# Patient Record
Sex: MALE | Race: ASIAN
[De-identification: names, ages, dates, MRNs, and addresses within clinical notes are randomized per-mention and may not be internally consistent; named-entity substitution may affect disease eponyms.]

---

## 2017-05-04 ENCOUNTER — HOSPITAL ENCOUNTER (INPATIENT)
Dept: HOSPITAL 74 - JER | Age: 72
LOS: 5 days | Discharge: HOME HEALTH | DRG: 292 | End: 2017-05-09
Attending: FAMILY MEDICINE | Admitting: FAMILY MEDICINE
Payer: MEDICARE

## 2017-05-04 VITALS — BODY MASS INDEX: 34.6 KG/M2

## 2017-05-04 DIAGNOSIS — Z79.4: ICD-10-CM

## 2017-05-04 DIAGNOSIS — R42: ICD-10-CM

## 2017-05-04 DIAGNOSIS — I50.23: ICD-10-CM

## 2017-05-04 DIAGNOSIS — F41.9: ICD-10-CM

## 2017-05-04 DIAGNOSIS — Z95.5: ICD-10-CM

## 2017-05-04 DIAGNOSIS — I87.2: ICD-10-CM

## 2017-05-04 DIAGNOSIS — E78.5: ICD-10-CM

## 2017-05-04 DIAGNOSIS — K59.01: ICD-10-CM

## 2017-05-04 DIAGNOSIS — I48.91: ICD-10-CM

## 2017-05-04 DIAGNOSIS — Z95.0: ICD-10-CM

## 2017-05-04 DIAGNOSIS — J44.9: ICD-10-CM

## 2017-05-04 DIAGNOSIS — M48.07: ICD-10-CM

## 2017-05-04 DIAGNOSIS — E11.42: ICD-10-CM

## 2017-05-04 DIAGNOSIS — N17.9: ICD-10-CM

## 2017-05-04 DIAGNOSIS — I11.0: Primary | ICD-10-CM

## 2017-05-04 DIAGNOSIS — G20: ICD-10-CM

## 2017-05-04 DIAGNOSIS — E66.9: ICD-10-CM

## 2017-05-04 DIAGNOSIS — I25.10: ICD-10-CM

## 2017-05-04 LAB
ALBUMIN SERPL-MCNC: 3.6 G/DL (ref 3.4–5)
ALP SERPL-CCNC: 78 U/L (ref 45–117)
ALT SERPL-CCNC: 44 U/L (ref 12–78)
ANION GAP SERPL CALC-SCNC: 7 MMOL/L (ref 8–16)
APPEARANCE UR: CLEAR
AST SERPL-CCNC: 38 U/L (ref 15–37)
BASOPHILS # BLD: 0.6 % (ref 0–2)
BILIRUB SERPL-MCNC: 0.7 MG/DL (ref 0.2–1)
BILIRUB UR STRIP.AUTO-MCNC: NEGATIVE MG/DL
CALCIUM SERPL-MCNC: 8.3 MG/DL (ref 8.5–10.1)
CO2 SERPL-SCNC: 26 MMOL/L (ref 21–32)
COCKROFT - GAULT: 76.43
COLOR UR: (no result)
CREAT SERPL-MCNC: 1.2 MG/DL (ref 0.7–1.3)
DEPRECATED RDW RBC AUTO: 15.8 % (ref 11.9–15.9)
EOSINOPHIL # BLD: 2.2 % (ref 0–4.5)
GLUCOSE SERPL-MCNC: 203 MG/DL (ref 74–106)
HYALINE CASTS URNS QL MICRO: 1 /LPF
KETONES UR QL STRIP: NEGATIVE
LEUKOCYTE ESTERASE UR QL STRIP.AUTO: NEGATIVE
MCH RBC QN AUTO: 26.1 PG (ref 25.7–33.7)
MCHC RBC AUTO-ENTMCNC: 32.9 G/DL (ref 32–35.9)
MCV RBC: 79.5 FL (ref 80–96)
MUCOUS THREADS URNS QL MICRO: (no result)
NEUTROPHILS # BLD: 78.7 % (ref 42.8–82.8)
NITRITE UR QL STRIP: NEGATIVE
PH UR: 5 [PH] (ref 5–8)
PLATELET # BLD AUTO: 135 K/MM3 (ref 134–434)
PMV BLD: 11.3 FL (ref 7.5–11.1)
PROT SERPL-MCNC: 6.6 G/DL (ref 6.4–8.2)
PROT UR QL STRIP: (no result)
PROT UR QL STRIP: NEGATIVE
RBC # BLD AUTO: <1 /HPF (ref 0–3)
RBC # UR STRIP: NEGATIVE /UL
TROPONIN I SERPL-MCNC: 0.02 NG/ML (ref 0–0.05)
UROBILINOGEN UR STRIP-MCNC: NEGATIVE E.U./DL (ref 0.2–1)
WBC # BLD AUTO: 8.8 K/MM3 (ref 4–10)
WBC # UR AUTO: 5 /HPF (ref 3–5)

## 2017-05-04 RX ADMIN — INSULIN ASPART SCH UNITS: 100 INJECTION, SOLUTION INTRAVENOUS; SUBCUTANEOUS at 22:11

## 2017-05-04 RX ADMIN — CARBIDOPA AND LEVODOPA SCH EACH: 25; 100 TABLET ORAL at 22:09

## 2017-05-04 RX ADMIN — POLYETHYLENE GLYCOL 3350 SCH GM: 17 POWDER, FOR SOLUTION ORAL at 22:01

## 2017-05-04 RX ADMIN — PRAMIPEXOLE DIHYDROCHLORIDE SCH MG: 0.25 TABLET ORAL at 22:00

## 2017-05-04 RX ADMIN — HEPARIN SODIUM SCH UNIT: 5000 INJECTION, SOLUTION INTRAVENOUS; SUBCUTANEOUS at 22:00

## 2017-05-04 RX ADMIN — GABAPENTIN SCH MG: 100 CAPSULE ORAL at 22:00

## 2017-05-04 RX ADMIN — POTASSIUM CHLORIDE SCH MEQ: 750 TABLET, EXTENDED RELEASE ORAL at 22:00

## 2017-05-04 RX ADMIN — ATORVASTATIN CALCIUM SCH MG: 20 TABLET, FILM COATED ORAL at 22:00

## 2017-05-04 RX ADMIN — CARBIDOPA AND LEVODOPA SCH: 25; 100 TABLET ORAL at 19:19

## 2017-05-04 RX ADMIN — INSULIN ASPART SCH: 100 INJECTION, SOLUTION INTRAVENOUS; SUBCUTANEOUS at 18:11

## 2017-05-04 NOTE — CON.GI
Consult


Consult Specialty:: gastroenterology


Reason for Consultation:: constipation





- History of Present Illness


History of Present Illness: 


I was asked by his son to see his dad. Patient known to me, history of Parkinson

's disease developed severe constipation. His last bowel movement was 1 week 

ago. He was admired with CHF and fluid overload.





- Past Medical History


Cardio/Vascular: Yes: AFIB, CAD, CHF, HTN, Hyperlipdemia, Other (ppm)





- Past Surgical History


Past Surgical History: Yes: Permanent Pacemaker





- Alcohol/Substance Use


Hx Alcohol Use: No





- Smoking History


Smoking history: Never smoked


Have you smoked in the past 12 months: No





- Social History


Usual Living Arrangement: With Spouse


ADL: Independent


History of Recent Travel: No





Home Medications





- Allergies


Allergies/Adverse Reactions: 


 Allergies











Allergy/AdvReac Type Severity Reaction Status Date / Time


 


No Known Drug Allergies Allergy   Verified 05/04/17 09:39














- Home Medications


Home Medications: 


Ambulatory Orders





Furosemide [Lasix -] 40 mg PO DAILY 12/30/16 


Gabapentin 100 mg PO TID 12/30/16 


Linagliptin [Tradjenta] 5 mg PO DAILY 12/30/16 


Mirabegron [Myrbetriq] 50 mg PO DAILY 12/30/16 


Atorvastatin Ca [Lipitor] 20 mg PO DAILY 05/04/17 


Carbidopa/Levodopa [Carbidopa-Levodopa  Tab] 1 each PO Q4H 05/04/17 


Insulin Glargine,Hum.rec.anlog [Lantus (nf)] 50 units SQ AM 05/04/17 


Linaclotide [Linzess] 290 mcg PO DAILY 05/04/17 


Naloxegol Oxalate [Movantik] 25 mg PO DAILY 05/04/17 


Potassium Chloride [Klor-Con 10] 10 meq PO BID 05/04/17 


Pramipexole Di-HCl [Pramipexole Dihydrochloride] 0.75 mg PO ASDIR 05/04/17 











Physical Exam-GI


Vital Signs: 


 Vital Signs











Temperature  98.1 F   05/04/17 18:24


 


Pulse Rate  65   05/04/17 18:24


 


Respiratory Rate  20   05/04/17 18:24


 


Blood Pressure  139/66   05/04/17 18:24


 


O2 Sat by Pulse Oximetry (%)  99   05/04/17 18:24











Constitutional: Yes: Well Nourished


Eyes: Yes: Conjunctiva Clear


HENT: Yes: Atraumatic


Neck: Yes: Supple


Cardiovascular: Yes: Regular Rate and Rhythm


Respiratory: Yes: CTA Bilaterally


Gastrointestinal Inspection: Yes: Distention


...Palpate: Yes: Soft.  No: Firm/Rigid, Guarding, Hepatomegaly, Mass, Pulsatile 

Mass, Splenomegaly, Tenderness


...Percussion: Yes: Tympanitic





Imaging





- Results


X-ray: Report Reviewed (colon retention)





Problem List





- Problems


(1) Constipation by delayed colonic transit


Assessment/Plan: 


R> oil retention enemas


   citroma in am 


Mirlax 34 gram tid


 dulcolax 2 tabs bid


Code(s): K59.01 - SLOW TRANSIT CONSTIPATION

## 2017-05-04 NOTE — EKG
Test Reason : 

Blood Pressure : ***/*** mmHG

Vent. Rate : 069 BPM     Atrial Rate : 080 BPM

   P-R Int : 000 ms          QRS Dur : 110 ms

    QT Int : 400 ms       P-R-T Axes : 000 -32 172 degrees

   QTc Int : 428 ms

 

Ventricular-paced rhythm

ABNORMAL ECG

WHEN COMPARED WITH ECG OF 07-NOV-2016 00:48,

ELECTRONIC VENTRICULAR PACEMAKER HAS REPLACED SINUS RHYTHM

Confirmed by NITA DRISCOLL MD ([id number]) on 5/4/2017 5:23:54 PM

 

Referred By:             Confirmed By:NITA DRISCOLL MD

## 2017-05-04 NOTE — CON.CARD
Consult


Consult Specialty:: Cardiology


Referred by:: ER


Reason for Consultation:: SOB, edema





- History of Present Illness


Chief Complaint: Weakness, constipation, sob, edema


History of Present Illness: 


71 year old man with a history of HTN, HLD, DM, CAD s/p PCI LAD 2008 and again 

2013, repeat cardiac cath 10/2014 showed patent LAD stents an only non-

obstructive CAD in the other coronary territories, Chronic diastolic HF, 

Parkinsons disease, non-adherent with follow up, has not been to the office in 

several years, last seen here in the hospital 2 years ago with c/o dizziness, 

he has since undergone a PPM placement at an outside facility now presenting 

with constipation, sob, edema. Pt. seen and examined in the ER in [place]. Pt. 

states that he has been followed by a different cardiologist since he was last 

seen by us but he does not know the doctors name or location. He states that he 

had the PPM placed with that doctor. He is currently c/o constipation for 

several days and additionally notes worsening sob and edema. Denies chest pain. 

continues to have chronic dizziness. no syncope or near syncope. no pnd, 

orthopnea.





- History Source


History Provided By: Patient, Medical Record


Limitations to Obtaining History: Poor Historian





- Past Medical History


Cardio/Vascular: Yes: AFIB, CAD, CHF, HTN, Hyperlipdemia, Other (ppm)





- Past Surgical History


Past Surgical History: Yes: Permanent Pacemaker





- Alcohol/Substance Use


Hx Alcohol Use: No





- Smoking History


Smoking history: Never smoked


Have you smoked in the past 12 months: No





- Social History


Usual Living Arrangement: With Spouse


ADL: Independent


History of Recent Travel: No





Home Medications





- Allergies


Allergies/Adverse Reactions: 


 Allergies











Allergy/AdvReac Type Severity Reaction Status Date / Time


 


No Known Drug Allergies Allergy   Verified 05/04/17 09:39














- Home Medications


Home Medications: 


Ambulatory Orders





Furosemide [Lasix -] 40 mg PO DAILY 12/30/16 


Gabapentin 100 mg PO TID 12/30/16 


Linagliptin [Tradjenta] 5 mg PO DAILY 12/30/16 


Mirabegron [Myrbetriq] 50 mg PO DAILY 12/30/16 


Atorvastatin Ca [Lipitor] 20 mg PO HS 05/04/17 


Carbidopa/Levodopa [Carbidopa-Levodopa  Tab] 1 each PO Q4H 05/04/17 


Insulin Glargine,Hum.rec.anlog [Lantus (nf)] 50 units SQ AM 05/04/17 


Linaclotide [Linzess] 290 mcg PO DAILY 05/04/17 


Naloxegol Oxalate [Movantik] 25 mg PO DAILY 05/04/17 


Potassium Chloride [Klor-Con 10] 10 meq PO BID 05/04/17 


Pramipexole Di-HCl [Pramipexole Dihydrochloride] 0.75 mg PO ASDIR 05/04/17 











Family Disease History





- Family Disease History


Family History: Denies





Review of Systems





- Review of Systems


Constitutional: reports: Weakness.  denies: No Symptoms, Chills, Diaphoresis, 

Fever, Lethargy, Loss of Appetite, Malaise, Night Sweats, Unintentional Wgt. 

Loss, Other


Eyes: denies: No Symptoms, Blind Spots, Blurred Vision, Double Vision, Eye Pain

, Floaters, Photophobia, Recent Change in Vision, Other


HENT: denies: No Symptoms, Difficult Swallowing, Ear Discharge, Ear Pain, 

Epistaxis, Gingival Bleeding, Hearing Loss, Mouth Swelling, Nasal Congestion, 

Ocular Prosthesis, Throat Pain, Toothache, Ringing in Ears, Other


Neck: denies: No Symptoms, Decreased ROM, Lumps, Pain on Movement, Stiffness, 

Swollen Glands, Tenderness, Other


Cardiovascular: reports: Edema, Shortness of Breath.  denies: Chest Pain, 

Palpitations, Other


Respiratory: reports: Exercise Intolerance, SOB, SOB on Exertion.  denies: No 

Symptoms, Cough, Hemoptysis, Orthopnea, PND, Snoring, Wheezing, Other


Gastrointestinal: reports: Constipation.  denies: No Symptoms, Abdominal Pain, 

Bloating, Diarrhea, Dysphagia, Indigestion, Melena, Nausea, Rectal Bleeding, 

Vomiting, Vomiting Blood, Other


Genitourinary: denies: No Symptoms, Burning, Discharge, Dysuria, Flank Pain, 

Frequency, Hematuria, Incontinence, Lesions, Menses, Pain, Testicular Mass, 

Testicular Pain, Testicular Swelling, Urgency, Vaginal Bleeding, Other


Breasts: denies: No Symptoms Reported, See HPI, Breast Implants, Discharge from 

Nipple, Lumps, Pain, Skin Changes, Other


Musculoskeletal: denies: No Symptoms, Back Pain, Crepitus, Decreased ROM, 

Extremity Pain, Joint Pain, Joint Swelling, Muscle Pain, Muscle Cramps, Muscle 

Weakness, Other


Integumentary: denies: No Symptoms, Blister, Bruising, Change in Color, Eczema, 

Erythema, Incision, Lesions, Lump, Pallor, Pruritis, Rash, Wound, Other


Neurological: reports: Incoordination, Tremors.  denies: No Symptoms, Change in 

LOC, Change in Speech, Confusion, Dizziness, Headache, Numbness, Parasthesia, 

Pre-Existing Deficit, Seizure, Syncope, Unsteady Gait, Weakness, Other


Endocrine: denies: No Symptoms, Excessive Sweating, Flushing, Increased Hunger, 

Increased Thirst, Intolerance to Cold, Intolerance to Heat, Unexplained Weight 

Gain, Unexplained Weight Loss, Other


Hematology/Lymphatic: denies: No Symptoms, Easily Bruised, Excessive Bleeding, 

Swollen Glands, Other


Psychiatric: denies: No Symptoms, Altered Sleep Pattern, Anxiety, Depression, 

Hallucinations, Panic, Paranoia, Suicidal, Other





- Risk Factors


Known Risk Factors: Yes: Hypercholesterolemia, Hypertension


Vital Signs: 


 Vital Signs











Temperature  97.8 F   05/04/17 09:37


 


Pulse Rate  61   05/04/17 14:05


 


Respiratory Rate  18   05/04/17 14:05


 


Blood Pressure  130/63   05/04/17 14:05


 


O2 Sat by Pulse Oximetry (%)  100   05/04/17 14:05











Constitutional: Yes: No Distress, Calm, Obese


Eyes: Yes: Conjunctiva Clear, EOM Intact, PERRL


HENT: Yes: Atraumatic, Normocephalic


Neck: Yes: Supple, Trachea Midline


Respiratory: Yes: Regular, Diminished, Rales.  No: Rhonchi, SOB, Wheezes


Gastrointestinal: Yes: Normal Bowel Sounds, Soft.  No: Distention, Tenderness


Cardiovascular: Yes: Regular Rate and Rhythm.  No: Bradycardia, Tachycardia, 

Gallop, Rub, Varicosities


JVD: No


Carotid Bruit: No


PMI: Non-Displaced


Heart Sounds: Yes: S1, S2.  No: Split S2, S3, S4, Clicks, Gallop, Rub, Bruit


Murmur: No: Systolic Murmur, Diastolic Murmur


Musculoskeletal: Yes: Muscle Weakness


Edema: Yes


Edema: LLE: 1+, RLE: 1+


Peripheral Pulses WNL: Yes


Peripheral Pulses: 2+ Left Doralis Pedis, 2+ Right Dorsalis Pedis


Neurological: Yes: Alert, Oriented


Psychiatric: Yes: Alert, Oriented





- Other Data


Labs, Other Data: 


 CBC, BMP





 05/04/17 10:24 





 05/04/17 10:24 





 Troponin, BNP











  05/04/17 05/04/17





  10:24 10:24


 


Troponin I  0.02 


 


B-Natriuretic Peptide   687.64 H








 Troponin, BNP











  05/04/17 05/04/17





  10:24 10:24


 


Troponin I  0.02 


 


B-Natriuretic Peptide   687.64 H











ekg-NSR Vpaced 60bpm








Imaging





- Results


Chest X-ray: Report Reviewed, Image Reviewed


EKG: Report Reviewed, Image Reviewed


Other: Report Reviewed, Image Reviewed





Assessment/Plan


71 year old man with a history of HTN, HLD, DM, CAD s/p PCI LAD 2008 and again 

2013, repeat cardiac cath 10/2014 showed patent LAD stents an only non-

obstructive CAD in the other coronary territories, Chronic diastolic HF, 

Parkinsons disease, non-adherent with follow up, has not been to the office in 

several years, last seen here in the hospital 2 years ago with c/o dizziness, 

he has since undergone a PPM placement at an outside facility now presenting 

with constipation, sob, edema. Pt. seen and examined in the ER in [facility]. Pt. 

states that he has been followed by a different cardiologist since he was last 

seen by us but he does not know the doctors name or location. He states that he 

had the PPM placed with that doctor. He is currently c/o constipation for 

several days and additionally notes worsening sob and edema. Denies chest pain. 

continues to have chronic dizziness. no syncope or near syncope. no pnd, 

orthopnea.





SOB/Edema-acute on chronic diastolic HF-chronic LE edema


-start Lasix 40mg IV bid


-monitor strict I/Os and daily weights


-fluid restrict < 1L x 24 hours


-monitor bun/creat, electrolytes and replete as needed


-check echo





Dizziness-chronic, multiple possible etiologies, likely related to Parkinsons 

and possible orthostatic hypotension


-pt had a PPM placed since last seen at [facility]


-would consider neurologic sources and medications given Parkinsons disease


-echo done 4/4/15- TDS, no pericardial effusion, trace MR, mild calc AV with 

normal opening, E-A reversal c/w impaired relaxation, grossly normal LV 

systolic function


-carotid doppler done 4/14/15-no sign of atherosclerotic disease on either side

, no evidence of hemodynamically sig internal carotid artery stenosis on either 

side


-check orthostatic BP





CAD-prior PCI LAD 2008 and 2013, cath 2014 with no sig restenosis and otherwise 

non-obs CAD


-cont with home medical regimen


-would be helpful to review results of any tests done at outside cardiologist 

office





HTN-at goal


-cont home medical regimen





HLD


-cont home medical regimen

## 2017-05-05 LAB
ALBUMIN SERPL-MCNC: 3.6 G/DL (ref 3.4–5)
ALP SERPL-CCNC: 81 U/L (ref 45–117)
ALT SERPL-CCNC: 19 U/L (ref 12–78)
ANION GAP SERPL CALC-SCNC: 7 MMOL/L (ref 8–16)
AST SERPL-CCNC: 30 U/L (ref 15–37)
BASOPHILS # BLD: 0.8 % (ref 0–2)
BILIRUB SERPL-MCNC: 0.7 MG/DL (ref 0.2–1)
CALCIUM SERPL-MCNC: 8.3 MG/DL (ref 8.5–10.1)
CHOLEST SERPL-MCNC: 101 MG/DL (ref 50–200)
CO2 SERPL-SCNC: 30 MMOL/L (ref 21–32)
COCKROFT - GAULT: 68.52
CREAT SERPL-MCNC: 1.3 MG/DL (ref 0.7–1.3)
DEPRECATED RDW RBC AUTO: 15.6 % (ref 11.9–15.9)
EOSINOPHIL # BLD: 4 % (ref 0–4.5)
GLUCOSE SERPL-MCNC: 130 MG/DL (ref 74–106)
LDLC SERPL CALC-MCNC: 34 MG/DL (ref 5–100)
MAGNESIUM SERPL-MCNC: 2.3 MG/DL (ref 1.8–2.4)
MCH RBC QN AUTO: 26 PG (ref 25.7–33.7)
MCHC RBC AUTO-ENTMCNC: 33.1 G/DL (ref 32–35.9)
MCV RBC: 78.8 FL (ref 80–96)
NEUTROPHILS # BLD: 63.7 % (ref 42.8–82.8)
PLATELET # BLD AUTO: 130 K/MM3 (ref 134–434)
PMV BLD: 11.4 FL (ref 7.5–11.1)
PROT SERPL-MCNC: 6.3 G/DL (ref 6.4–8.2)
SP GR UR: 1.01 (ref 1–1.02)
TROPONIN I SERPL-MCNC: 0.02 NG/ML (ref 0–0.05)
TSH SERPL-ACNC: 2.84 UIU/ML (ref 0.36–3.74)
WBC # BLD AUTO: 8.2 K/MM3 (ref 4–10)

## 2017-05-05 RX ADMIN — FUROSEMIDE SCH MG: 10 INJECTION, SOLUTION INTRAVENOUS at 14:11

## 2017-05-05 RX ADMIN — CARBIDOPA AND LEVODOPA SCH EACH: 25; 100 TABLET ORAL at 23:30

## 2017-05-05 RX ADMIN — INSULIN ASPART SCH: 100 INJECTION, SOLUTION INTRAVENOUS; SUBCUTANEOUS at 11:55

## 2017-05-05 RX ADMIN — HEPARIN SODIUM SCH UNIT: 5000 INJECTION, SOLUTION INTRAVENOUS; SUBCUTANEOUS at 21:42

## 2017-05-05 RX ADMIN — HEPARIN SODIUM SCH UNIT: 5000 INJECTION, SOLUTION INTRAVENOUS; SUBCUTANEOUS at 10:13

## 2017-05-05 RX ADMIN — GABAPENTIN SCH MG: 100 CAPSULE ORAL at 06:49

## 2017-05-05 RX ADMIN — CARBIDOPA AND LEVODOPA SCH EACH: 25; 100 TABLET ORAL at 17:54

## 2017-05-05 RX ADMIN — INSULIN ASPART SCH: 100 INJECTION, SOLUTION INTRAVENOUS; SUBCUTANEOUS at 06:50

## 2017-05-05 RX ADMIN — POTASSIUM CHLORIDE SCH MEQ: 750 TABLET, EXTENDED RELEASE ORAL at 21:42

## 2017-05-05 RX ADMIN — POTASSIUM CHLORIDE SCH MEQ: 750 TABLET, EXTENDED RELEASE ORAL at 10:14

## 2017-05-05 RX ADMIN — INSULIN DETEMIR SCH UNITS: 100 INJECTION, SOLUTION SUBCUTANEOUS at 06:50

## 2017-05-05 RX ADMIN — FUROSEMIDE SCH MG: 10 INJECTION, SOLUTION INTRAVENOUS at 06:49

## 2017-05-05 RX ADMIN — POLYETHYLENE GLYCOL 3350 SCH GM: 17 POWDER, FOR SOLUTION ORAL at 21:43

## 2017-05-05 RX ADMIN — ATORVASTATIN CALCIUM SCH MG: 20 TABLET, FILM COATED ORAL at 21:42

## 2017-05-05 RX ADMIN — GABAPENTIN SCH MG: 100 CAPSULE ORAL at 21:42

## 2017-05-05 RX ADMIN — INSULIN ASPART SCH UNITS: 100 INJECTION, SOLUTION INTRAVENOUS; SUBCUTANEOUS at 21:43

## 2017-05-05 RX ADMIN — CARBIDOPA AND LEVODOPA SCH EACH: 25; 100 TABLET ORAL at 06:50

## 2017-05-05 RX ADMIN — GABAPENTIN SCH MG: 100 CAPSULE ORAL at 14:11

## 2017-05-05 RX ADMIN — CARBIDOPA AND LEVODOPA SCH EACH: 25; 100 TABLET ORAL at 14:12

## 2017-05-05 RX ADMIN — ASPIRIN 81 MG SCH MG: 81 TABLET ORAL at 10:12

## 2017-05-05 RX ADMIN — POLYETHYLENE GLYCOL 3350 SCH GM: 17 POWDER, FOR SOLUTION ORAL at 10:14

## 2017-05-05 RX ADMIN — PRAMIPEXOLE DIHYDROCHLORIDE SCH MG: 0.25 TABLET ORAL at 21:42

## 2017-05-05 RX ADMIN — CARBIDOPA AND LEVODOPA SCH EACH: 25; 100 TABLET ORAL at 10:14

## 2017-05-05 RX ADMIN — SITAGLIPTIN SCH MG: 100 TABLET, FILM COATED ORAL at 10:11

## 2017-05-05 RX ADMIN — INSULIN ASPART SCH UNITS: 100 INJECTION, SOLUTION INTRAVENOUS; SUBCUTANEOUS at 17:31

## 2017-05-05 NOTE — EKG
Test Reason : 

Blood Pressure : ***/*** mmHG

Vent. Rate : 068 BPM     Atrial Rate : 068 BPM

   P-R Int : 224 ms          QRS Dur : 116 ms

    QT Int : 430 ms       P-R-T Axes : 021 -33 177 degrees

   QTc Int : 457 ms

 

Atrial-sensed ventricular-paced rhythm with prolonged AV conduction

ABNORMAL ECG

WHEN COMPARED WITH ECG OF 04-MAY-2017 10:02,

NO SIGNIFICANT CHANGE WAS FOUND

Confirmed by NAI MENDEZ MD ([id number]) on 5/5/2017 10:20:50 AM

 

Referred By: YANDY LYNN           Confirmed By:NAI MENDEZ MD

## 2017-05-05 NOTE — PN
Progress Note, Physician


Chief Complaint: 


no distress





- Current Medication List


Current Medications: 


Active Medications





Acetaminophen (Tylenol -)  650 mg PO Q4H PRN


   PRN Reason: FEVER OR PAIN


Atorvastatin Calcium (Lipitor -)  20 mg PO HS [facility]


   Last Admin: 05/04/17 22:00 Dose:  20 mg


Carbidopa/Levodopa (Sinemet 25/100 -)  1 each PO Q4HWA [facility]


   Last Admin: 05/05/17 06:50 Dose:  1 each


Furosemide (Lasix Injection -)  40 mg IVPUSH BID@0600,1400 [facility]


   Last Admin: 05/05/17 06:49 Dose:  40 mg


Gabapentin (Neurontin -)  100 mg PO TID [facility]


   Last Admin: 05/05/17 06:49 Dose:  100 mg


Heparin Sodium (Porcine) (Heparin -)  5,000 unit SQ BID [facility]


   Last Admin: 05/04/17 22:00 Dose:  5,000 unit


Insulin Aspart (Novolog Vial Sliding Scale -)  1 vial SQ ACHS [facility]


   PRN Reason: Protocol


   Last Admin: 05/05/17 06:50 Dose:  Not Given


Insulin Detemir (Levemir Vial)  40 units SQ AM [facility]


   Last Admin: 05/05/17 06:50 Dose:  40 units


Non-Formulary Medication (Linaclotide [Linzess])  290 mcg PO DAILY [facility]


Non-Formulary Medication (Mirabegron [Myrbetriq])  50 mg PO DAILY [facility]


Non-Formulary Medication (Naloxegol Oxalate [Movantik])  25 mg PO DAILY [facility]


Polyethylene Glycol (Miralax (For Daily Use) -)  34 gm PO BID [facility]


   Last Admin: 05/04/17 22:01 Dose:  34 gm


Potassium Chloride (K-Dur -)  10 meq PO BID [facility]


   Last Admin: 05/04/17 22:00 Dose:  10 meq


Pramipexole Dihydrochloride (Mirapex -)  0.75 mg PO HS [facility]


   Last Admin: 05/04/17 22:00 Dose:  0.75 mg


Sitagliptin Phosphate (Januvia -)  100 mg PO ACBK [facility]











- Objective


Vital Signs: 


 Vital Signs











Temperature  98.1 F   05/05/17 06:00


 


Pulse Rate  65   05/05/17 06:00


 


Respiratory Rate  20   05/05/17 06:00


 


Blood Pressure  121/57   05/05/17 06:00


 


O2 Sat by Pulse Oximetry (%)  100   05/04/17 21:00











Constitutional: Yes: No Distress


Cardiovascular: Yes: Regular Rate and Rhythm


Respiratory: Yes: Other (slight decreased breath sounds at bases)


Gastrointestinal: Yes: Soft


Edema: Yes


Edema: LLE: 2+, RLE: 2+


Labs: 


 CBC, BMP





 05/05/17 05:35 





 05/05/17 05:35 





 Laboratory Tests











  05/04/17 05/05/17 05/05/17





  10:24 05:35 05:35


 


WBC   8.2 


 


Hgb   12.4 


 


Plt Count   130 L 


 


Potassium    4.3


 


Creatinine    1.3


 


Troponin I  0.02   0.02














- ....Imaging


EKG: Image Reviewed (TELE: NSR)





Assessment/Plan


Assessment/Plan


71 year old man with a history of HTN, HLD, DM, CAD s/p PCI LAD 2008 and again 

2013, repeat cardiac cath 10/2014 showed patent LAD stents an only non-

obstructive CAD in the other coronary territories, PPM,  Chronic diastolic HF, 

Parkinsons disease, non-adherent with follow up, has not been to the office in 

several years, last seen here in the hospital 2 years ago with c/o dizziness, 

he has since undergone a PPM placement at an outside facility now presenting 

with constipation, sob, edema. 





SOB/Edema-acute on chronic systolic HF-chronic LE edema


- Lasix 40mg IV bid for one more day, then can likely swtich to PO


-monitor strict I/Os and daily weights


-monitor bun/creat, electrolytes and replete as needed


-echo shows moderate LV systolic dysfx





Dizziness-chronic, multiple possible etiologies, likely related to Parkinsons 

and possible orthostatic hypotension


-pt had a PPM placed since last seen at [facility]


-would consider neurologic sources and medications given Parkinsons disease


-carotid doppler done 4/14/15-no sign of atherosclerotic disease on either side

, no evidence of hemodynamically sig internal carotid artery stenosis on either 

side


-check orthostatic BP





CAD-prior PCI LAD 2008 and 2013, cath 2014 with no sig restenosis and otherwise 

non-obs CAD


-cont with home medical regimen


-would be helpful to review results of any tests done at outside cardiologist 

office

## 2017-05-05 NOTE — HP
Admitting History and Physical





- Admission


History of Present Illness: 


71-year-old male presents to the ED with complaints of constipation for the 

past week unrelieved with over-the-counter medications. patient c/o increased 

shortness of breath with minimal exertion. Patient mentions lower extremity 

edema despite being on Lasix for his CHF. Patient denies chest pain, fever, 

chills, nausea or dysuria, palpitations, or cough. Patient states history of 

Parkinson's, CHF, COPD, diabetes, hypertension, and dyslipidemia. Patient also 

states history of pacemaker.


This am feels better


no cp


less sob





- Past Medical History


Cardiovascular: Yes: AFIB, CAD, CHF, HTN, Hyperlipdemia, Other (ppm)





- Past Surgical History


Past Surgical History: Yes: Permanent Pacemaker





- Smoking History


Smoking history: Never smoked


Have you smoked in the past 12 months: No





- Alcohol/Substance Use


Hx Alcohol Use: No





- Social History


ADL: Independent


History of Recent Travel: No





Home Medications





- Allergies


Allergies/Adverse Reactions: 


 Allergies











Allergy/AdvReac Type Severity Reaction Status Date / Time


 


No Known Drug Allergies Allergy   Verified 05/04/17 09:39














- Home Medications


Home Medications: 


Ambulatory Orders





Furosemide [Lasix -] 40 mg PO DAILY 12/30/16 


Gabapentin 100 mg PO TID 12/30/16 


Linagliptin [Tradjenta] 5 mg PO DAILY 12/30/16 


Mirabegron [Myrbetriq] 50 mg PO DAILY 12/30/16 


Atorvastatin Ca [Lipitor] 20 mg PO DAILY 05/04/17 


Carbidopa/Levodopa [Carbidopa-Levodopa  Tab] 1 each PO Q4H 05/04/17 


Insulin Glargine,Hum.rec.anlog [Lantus (nf)] 50 units SQ AM 05/04/17 


Linaclotide [Linzess] 290 mcg PO DAILY 05/04/17 


Naloxegol Oxalate [Movantik] 25 mg PO DAILY 05/04/17 


Potassium Chloride [Klor-Con 10] 10 meq PO BID 05/04/17 


Pramipexole Di-HCl [Pramipexole Dihydrochloride] 0.75 mg PO ASDIR 05/04/17 











Review of Systems





- Review of Systems


HENT: reports: No Symptoms


Neck: reports: No Symptoms


Cardiovascular: reports: Edema, Shortness of Breath.  denies: Chest Pain


Respiratory: reports: Orthopnea, SOB, SOB on Exertion


Gastrointestinal: reports: Constipation


Genitourinary: reports: No Symptoms


Musculoskeletal: reports: Back Pain





Physical Examination


Vital Signs: 


 Vital Signs











Temperature  98.1 F   05/05/17 06:00


 


Pulse Rate  65   05/05/17 06:00


 


Respiratory Rate  20   05/05/17 06:00


 


Blood Pressure  121/57   05/05/17 06:00


 


O2 Sat by Pulse Oximetry (%)  100   05/04/17 21:00











Cardiovascular: Yes: Murmur, S1, S2


Respiratory: Yes: Rales (at the bases)


Gastrointestinal: Yes: Normal Bowel Sounds, Soft, Distention.  No: Tenderness


Edema: Yes


Neurological: Yes: Alert, Oriented, Weakness





Problem List





- Problems


(1) CHF (congestive heart failure)


Assessment/Plan: 


IV LASIX


ECHO


MONITOR LABS


Code(s): I50.9 - HEART FAILURE, UNSPECIFIED   Qualifiers: 


     Congestive heart failure type: unspecified congestive heart failure type  

   Congestive heart failure chronicity: acute on chronic     Qualified Code(s): 

I50.9 - Heart failure, unspecified  





(2) CAD (coronary artery disease)


Assessment/Plan: 


SAME MEDS


Code(s): I25.10 - ATHSCL HEART DISEASE OF NATIVE CORONARY ARTERY W/O ANG PCTRS 

  





(3) Constipation


Assessment/Plan: 


GI ON CASE


ENEMA


PT TOOK CITRATE OF MAG ON HIS OWN


Code(s): K59.00 - CONSTIPATION, UNSPECIFIED   Qualifiers: 


     Constipation type: unspecified constipation type     Qualified Code(s): 

K59.00 - Constipation, unspecified  





(4) Diabetes


Assessment/Plan: 


BGM


SS


Code(s): E11.9 - TYPE 2 DIABETES MELLITUS WITHOUT COMPLICATIONS   Qualifiers: 


     Diabetes mellitus type: type 2





(5) Parkinson disease


Assessment/Plan: 


SAME MEDS


Code(s): G20 - PARKINSON'S DISEASE

## 2017-05-06 LAB
ALBUMIN SERPL-MCNC: 3.8 G/DL (ref 3.4–5)
ALP SERPL-CCNC: 81 U/L (ref 45–117)
ALT SERPL-CCNC: 29 U/L (ref 12–78)
ANION GAP SERPL CALC-SCNC: 8 MMOL/L (ref 8–16)
AST SERPL-CCNC: 26 U/L (ref 15–37)
BASOPHILS # BLD: 0.8 % (ref 0–2)
BILIRUB SERPL-MCNC: 0.6 MG/DL (ref 0.2–1)
CALCIUM SERPL-MCNC: 9 MG/DL (ref 8.5–10.1)
CO2 SERPL-SCNC: 30 MMOL/L (ref 21–32)
COCKROFT - GAULT: 62.68
CREAT SERPL-MCNC: 1.4 MG/DL (ref 0.7–1.3)
DEPRECATED RDW RBC AUTO: 15.9 % (ref 11.9–15.9)
EOSINOPHIL # BLD: 4.5 % (ref 0–4.5)
GLUCOSE SERPL-MCNC: 139 MG/DL (ref 74–106)
MCH RBC QN AUTO: 25.8 PG (ref 25.7–33.7)
MCHC RBC AUTO-ENTMCNC: 32.6 G/DL (ref 32–35.9)
MCV RBC: 79.3 FL (ref 80–96)
NEUTROPHILS # BLD: 67.8 % (ref 42.8–82.8)
PLATELET # BLD AUTO: 148 K/MM3 (ref 134–434)
PMV BLD: 11.7 FL (ref 7.5–11.1)
PROT SERPL-MCNC: 6.7 G/DL (ref 6.4–8.2)
WBC # BLD AUTO: 9 K/MM3 (ref 4–10)

## 2017-05-06 RX ADMIN — POTASSIUM CHLORIDE SCH MEQ: 750 TABLET, EXTENDED RELEASE ORAL at 09:11

## 2017-05-06 RX ADMIN — FUROSEMIDE SCH MG: 10 INJECTION, SOLUTION INTRAVENOUS at 06:50

## 2017-05-06 RX ADMIN — PRAMIPEXOLE DIHYDROCHLORIDE SCH MG: 0.25 TABLET ORAL at 21:26

## 2017-05-06 RX ADMIN — FUROSEMIDE SCH MG: 10 INJECTION, SOLUTION INTRAVENOUS at 14:08

## 2017-05-06 RX ADMIN — GABAPENTIN SCH MG: 100 CAPSULE ORAL at 21:24

## 2017-05-06 RX ADMIN — ASPIRIN 81 MG SCH MG: 81 TABLET ORAL at 09:11

## 2017-05-06 RX ADMIN — POLYETHYLENE GLYCOL 3350 SCH: 17 POWDER, FOR SOLUTION ORAL at 21:28

## 2017-05-06 RX ADMIN — CARVEDILOL SCH MG: 3.12 TABLET, FILM COATED ORAL at 21:28

## 2017-05-06 RX ADMIN — HEPARIN SODIUM SCH UNIT: 5000 INJECTION, SOLUTION INTRAVENOUS; SUBCUTANEOUS at 09:11

## 2017-05-06 RX ADMIN — INSULIN DETEMIR SCH UNITS: 100 INJECTION, SOLUTION SUBCUTANEOUS at 06:51

## 2017-05-06 RX ADMIN — INSULIN ASPART SCH: 100 INJECTION, SOLUTION INTRAVENOUS; SUBCUTANEOUS at 11:41

## 2017-05-06 RX ADMIN — INSULIN ASPART SCH UNITS: 100 INJECTION, SOLUTION INTRAVENOUS; SUBCUTANEOUS at 17:02

## 2017-05-06 RX ADMIN — SITAGLIPTIN SCH MG: 100 TABLET, FILM COATED ORAL at 06:51

## 2017-05-06 RX ADMIN — CARBIDOPA AND LEVODOPA SCH EACH: 25; 100 TABLET ORAL at 14:09

## 2017-05-06 RX ADMIN — ATORVASTATIN CALCIUM SCH MG: 20 TABLET, FILM COATED ORAL at 21:25

## 2017-05-06 RX ADMIN — CARBIDOPA AND LEVODOPA SCH EACH: 25; 100 TABLET ORAL at 17:01

## 2017-05-06 RX ADMIN — POLYETHYLENE GLYCOL 3350 SCH GM: 17 POWDER, FOR SOLUTION ORAL at 09:12

## 2017-05-06 RX ADMIN — GABAPENTIN SCH MG: 100 CAPSULE ORAL at 14:08

## 2017-05-06 RX ADMIN — POTASSIUM CHLORIDE SCH MEQ: 750 TABLET, EXTENDED RELEASE ORAL at 21:25

## 2017-05-06 RX ADMIN — HEPARIN SODIUM SCH UNIT: 5000 INJECTION, SOLUTION INTRAVENOUS; SUBCUTANEOUS at 21:23

## 2017-05-06 RX ADMIN — CARBIDOPA AND LEVODOPA SCH EACH: 25; 100 TABLET ORAL at 06:50

## 2017-05-06 RX ADMIN — GABAPENTIN SCH MG: 100 CAPSULE ORAL at 06:50

## 2017-05-06 RX ADMIN — CARBIDOPA AND LEVODOPA SCH EACH: 25; 100 TABLET ORAL at 09:11

## 2017-05-06 RX ADMIN — INSULIN ASPART SCH: 100 INJECTION, SOLUTION INTRAVENOUS; SUBCUTANEOUS at 06:51

## 2017-05-06 RX ADMIN — INSULIN ASPART SCH UNITS: 100 INJECTION, SOLUTION INTRAVENOUS; SUBCUTANEOUS at 21:22

## 2017-05-06 NOTE — CONSULT
Consult - text type





- Consultation


Consultation Note: 


NEUROLOGY CONSULTATION is greatly appreciated:





This 72 yo RH m man with h/o HTN, DM, ASHD, CHF and PPM is well-known to me 

over many years for treatment of Parkinson's disease, RLS, Lumbosacral spinal 

stenosis with chronic low back pain and diabetic peripheral neuropathy.


Last seen by me on 7/11/16 but then seen by Dr. Jack due to change of 

insurance and his meds were halved.


Since then, according to his son he "hasn't been the same" and has increased 

pain in his back, legs, decreased gait and can't climb stairs.


Admitted for increased weakness, difficulty ambulating and CHF.





NAILA: Obese. 2 + pretibial edema.


NEURO: Masked facies.


           Hypophonic speech.


           Bradykinesia. No tremor. + Cogwheling. Decreased VARGHESE's.


           Normal strength. Areflexic in legs


           Decreased vibration to the ankles.


           Romberg +


           Flexed, shuffling, festinating.





IMP: Parkinson's disease.


       Leg Pains related to RLS


       LS Spinal stenosis


       Diabetic peripheral neuropathy.





SUGGEST: Increase L-Dopa to Sinemet CR 50/200 QID @ 7, 11, 3 and 7


              Increase Pramipexole to .25 QID with Sinemet and .75 qHS.


              Meds can be further increased (towards their previous levels) on 

an out patient basis.





Thank you very much,


Pavel Borjas MD
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
## 2017-05-06 NOTE — PN
Progress Note, Physician


Chief Complaint: 


Pt A&Ox3; OOB in chair; anxious. Denies dyspnea or chest pain.


History of Present Illness: 


1-year-old male (collins Moore), presents to the ED with complaints of 

constipation for the past week unrelieved with over-the-counter medications. 

Patient c/o ncreased weakness, decreased appetite, increased shortness of 

breath with minimal exertion. Patient mentions lower extremity edema (R>L) 

despite being on Lasix for his CHF. Patient denies chest pain, fever, chills, 

nausea or dysuria, palpitations, or cough. Patient states history of Parkinson's

, CHF, CAD, COPD, diabetes, hypertension, and dyslipidemia. Patient also states 

history of pacemaker done recently at [facility].


Timing/Duration: 1 week


Severity: moderate


Associated Symptoms: reports: shortness of breath, weakness








- Current Medication List


Current Medications: 


Active Medications





Acetaminophen (Tylenol -)  650 mg PO Q4H PRN


   PRN Reason: FEVER OR PAIN


Aspirin (Asa -)  81 mg PO DAILY [facility]


   Last Admin: 05/06/17 09:11 Dose:  81 mg


Atorvastatin Calcium (Lipitor -)  20 mg PO HS [facility]


   Last Admin: 05/05/17 21:42 Dose:  20 mg


Carbidopa/Levodopa (Sinemet 25/100 -)  1 each PO Q4HWA [facility]


   Last Admin: 05/06/17 09:11 Dose:  1 each


Carvedilol (Coreg -)  3.125 mg PO BID [facility]


Furosemide (Lasix Injection -)  40 mg IVPUSH BID@0600,1400 [facility]


   Last Admin: 05/06/17 06:50 Dose:  40 mg


Gabapentin (Neurontin -)  100 mg PO TID [facility]


   Last Admin: 05/06/17 06:50 Dose:  100 mg


Heparin Sodium (Porcine) (Heparin -)  5,000 unit SQ BID [facility]


   Last Admin: 05/06/17 09:11 Dose:  5,000 unit


Insulin Aspart (Novolog Vial Sliding Scale -)  1 vial SQ ACHS [facility]


   PRN Reason: Protocol


   Last Admin: 05/06/17 11:41 Dose:  Not Given


Insulin Detemir (Levemir Vial)  40 units SQ AM [facility]


   Last Admin: 05/06/17 06:51 Dose:  40 units


Non-Formulary Medication (Linaclotide [Linzess])  290 mcg PO DAILY [facility]


Non-Formulary Medication (Mirabegron [Myrbetriq])  50 mg PO DAILY [facility]


Non-Formulary Medication (Naloxegol Oxalate [Movantik])  25 mg PO DAILY [facility]


Polyethylene Glycol (Miralax (For Daily Use) -)  34 gm PO BID [facility]


   Last Admin: 05/06/17 09:12 Dose:  34 gm


Potassium Chloride (K-Dur -)  10 meq PO BID [facility]


   Last Admin: 05/06/17 09:11 Dose:  10 meq


Pramipexole Dihydrochloride (Mirapex -)  0.75 mg PO HS [facility]


   Last Admin: 05/05/17 21:42 Dose:  0.75 mg


Ramipril (Altace -)  2.5 mg PO DAILY [facility]


Sitagliptin Phosphate (Januvia -)  100 mg PO ACBK [facility]


   Last Admin: 05/06/17 06:51 Dose:  100 mg











- Objective


Vital Signs: 


 Vital Signs











Temperature  98.2 F   05/06/17 09:00


 


Pulse Rate  62   05/06/17 09:00


 


Respiratory Rate  16   05/06/17 09:00


 


Blood Pressure  112/64   05/06/17 09:00


 


O2 Sat by Pulse Oximetry (%)  94 L  05/06/17 09:00











Constitutional: Yes: Anxious


Eyes: Yes: WNL


HENT: Yes: WNL


Neck: Yes: WNL


Cardiovascular: Yes: S1, S2 (split)


Respiratory: Yes: WNL


Gastrointestinal: Yes: Soft


...Rectal Exam: Yes: Deferred


Genitourinary: No: Anuria


Breast(s): Yes: WNL


Musculoskeletal: Yes: Muscle Weakness


Extremities: Yes: Cool


Edema: Yes


Edema: LLE: Trace, RLE: Trace


Peripheral Pulses WNL: Yes


Neurological: Yes: Alert, Oriented


Labs: 


 CBC, BMP





 05/06/17 05:35 





 05/06/17 05:35 











- ....Imaging


Chest X-ray: Image Reviewed (pulmonary vascular congestion)





Problem List





- Problems


(1) CAD (coronary artery disease)


Assessment/Plan: 


f/u prior cardiac workup reports.


Code(s): I25.10 - ATHSCL HEART DISEASE OF NATIVE CORONARY ARTERY W/O ANG PCTRS 

  





(2) Constipation


Code(s): K59.00 - CONSTIPATION, UNSPECIFIED   Qualifiers: 


     Constipation type: unspecified constipation type     Qualified Code(s): 

K59.00 - Constipation, unspecified  





(3) Diabetes


Code(s): E11.9 - TYPE 2 DIABETES MELLITUS WITHOUT COMPLICATIONS   Qualifiers: 


     Diabetes mellitus type: type 2





(4) HTN (hypertension)


Code(s): I10 - ESSENTIAL (PRIMARY) HYPERTENSION   





(5) Parkinson disease


Assessment/Plan: 


f/u with neurologist (Dr. Borjas).


Code(s): G20 - PARKINSON'S DISEASE





(6) Acute on chronic systolic (congestive) heart failure


Assessment/Plan: 


Continue beta blocker, ACEI; furosemide.


F/u BUN/Cr, electrolytes, Is and Os, daily weight.


Code(s): I50.23 - ACUTE ON CHRONIC SYSTOLIC (CONGESTIVE) HEART FAILURE





(7) History of permanent cardiac pacemaker placement


Code(s): Z95.0 - PRESENCE OF CARDIAC PACEMAKER

## 2017-05-06 NOTE — PN
Progress Note, Physician


History of Present Illness: 


feels better


no cp





- Current Medication List


Current Medications: 


Active Medications





Acetaminophen (Tylenol -)  650 mg PO Q4H PRN


   PRN Reason: FEVER OR PAIN


Aspirin (Asa -)  81 mg PO DAILY [facility]


   Last Admin: 05/06/17 09:11 Dose:  81 mg


Atorvastatin Calcium (Lipitor -)  20 mg PO HS [facility]


   Last Admin: 05/05/17 21:42 Dose:  20 mg


Carbidopa/Levodopa (Sinemet 25/100 -)  1 each PO Q4HWA [facility]


   Last Admin: 05/06/17 09:11 Dose:  1 each


Furosemide (Lasix Injection -)  40 mg IVPUSH BID@0600,1400 [facility]


   Last Admin: 05/06/17 06:50 Dose:  40 mg


Gabapentin (Neurontin -)  100 mg PO TID [facility]


   Last Admin: 05/06/17 06:50 Dose:  100 mg


Heparin Sodium (Porcine) (Heparin -)  5,000 unit SQ BID [facility]


   Last Admin: 05/06/17 09:11 Dose:  5,000 unit


Insulin Aspart (Novolog Vial Sliding Scale -)  1 vial SQ ACHS [facility]


   PRN Reason: Protocol


   Last Admin: 05/06/17 11:41 Dose:  Not Given


Insulin Detemir (Levemir Vial)  40 units SQ AM [facility]


   Last Admin: 05/06/17 06:51 Dose:  40 units


Non-Formulary Medication (Linaclotide [Linzess])  290 mcg PO DAILY [facility]


Non-Formulary Medication (Mirabegron [Myrbetriq])  50 mg PO DAILY [facility]


Non-Formulary Medication (Naloxegol Oxalate [Movantik])  25 mg PO DAILY [facility]


Polyethylene Glycol (Miralax (For Daily Use) -)  34 gm PO BID [facility]


   Last Admin: 05/06/17 09:12 Dose:  34 gm


Potassium Chloride (K-Dur -)  10 meq PO BID [facility]


   Last Admin: 05/06/17 09:11 Dose:  10 meq


Pramipexole Dihydrochloride (Mirapex -)  0.75 mg PO HS [facility]


   Last Admin: 05/05/17 21:42 Dose:  0.75 mg


Sitagliptin Phosphate (Januvia -)  100 mg PO ACBK [facility]


   Last Admin: 05/06/17 06:51 Dose:  100 mg











- Objective


Vital Signs: 


 Vital Signs











Temperature  98.2 F   05/06/17 09:00


 


Pulse Rate  62   05/06/17 09:00


 


Respiratory Rate  16   05/06/17 09:00


 


Blood Pressure  112/64   05/06/17 09:00


 


O2 Sat by Pulse Oximetry (%)  94 L  05/06/17 09:00











Cardiovascular: Yes: S1, S2


Respiratory: Yes: Regular, CTA Bilaterally


Gastrointestinal: Yes: Normal Bowel Sounds, Soft


Edema: Yes (improved)


Labs: 


 CBC, BMP





 05/06/17 05:35 





 05/06/17 05:35 











Problem List





- Problems


(1) CHF (congestive heart failure)


Assessment/Plan: 


IV LASIX


ECHO NOTED MOD LV DYSFUNCTION--EF 41%


MONITOR LABS


ADD COREG AND ALTACE


Code(s): I50.9 - HEART FAILURE, UNSPECIFIED   Qualifiers: 


     Congestive heart failure type: systolic     Congestive heart failure 

chronicity: acute on chronic        Qualified Code(s): I50.23 - Acute on 

chronic systolic (congestive) heart failure  





(2) CAD (coronary artery disease)


Assessment/Plan: 


SAME MEDS


Code(s): I25.10 - ATHSCL HEART DISEASE OF NATIVE CORONARY ARTERY W/O ANG PCTRS 

  





(3) Constipation


Assessment/Plan: 


GI ON CASE


ENEMA


PT TOOK CITRATE OF MAG ON HIS OWN


Code(s): K59.00 - CONSTIPATION, UNSPECIFIED   Qualifiers: 


     Constipation type: unspecified constipation type     Qualified Code(s): 

K59.00 - Constipation, unspecified  





(4) Diabetes


Code(s): E11.9 - TYPE 2 DIABETES MELLITUS WITHOUT COMPLICATIONS   Qualifiers: 


     Diabetes mellitus type: type 2





(5) Parkinson disease


Code(s): G20 - PARKINSON'S DISEASE

## 2017-05-07 LAB
ANION GAP SERPL CALC-SCNC: 11 MMOL/L (ref 8–16)
CALCIUM SERPL-MCNC: 9.5 MG/DL (ref 8.5–10.1)
CO2 SERPL-SCNC: 31 MMOL/L (ref 21–32)
COCKROFT - GAULT: 58.79
CREAT SERPL-MCNC: 1.5 MG/DL (ref 0.7–1.3)
GLUCOSE SERPL-MCNC: 234 MG/DL (ref 74–106)
MAGNESIUM SERPL-MCNC: 2.8 MG/DL (ref 1.8–2.4)

## 2017-05-07 RX ADMIN — FUROSEMIDE SCH MG: 10 INJECTION, SOLUTION INTRAVENOUS at 14:39

## 2017-05-07 RX ADMIN — PRAMIPEXOLE DIHYDROCHLORIDE SCH MG: 0.25 TABLET ORAL at 22:25

## 2017-05-07 RX ADMIN — HEPARIN SODIUM SCH UNIT: 5000 INJECTION, SOLUTION INTRAVENOUS; SUBCUTANEOUS at 22:24

## 2017-05-07 RX ADMIN — CARVEDILOL SCH MG: 3.12 TABLET, FILM COATED ORAL at 22:24

## 2017-05-07 RX ADMIN — POLYETHYLENE GLYCOL 3350 SCH GM: 17 POWDER, FOR SOLUTION ORAL at 10:52

## 2017-05-07 RX ADMIN — INSULIN DETEMIR SCH: 100 INJECTION, SOLUTION SUBCUTANEOUS at 07:09

## 2017-05-07 RX ADMIN — SITAGLIPTIN SCH MG: 100 TABLET, FILM COATED ORAL at 06:08

## 2017-05-07 RX ADMIN — ATORVASTATIN CALCIUM SCH MG: 20 TABLET, FILM COATED ORAL at 22:24

## 2017-05-07 RX ADMIN — GABAPENTIN SCH MG: 100 CAPSULE ORAL at 14:38

## 2017-05-07 RX ADMIN — POLYETHYLENE GLYCOL 3350 SCH: 17 POWDER, FOR SOLUTION ORAL at 22:28

## 2017-05-07 RX ADMIN — CARVEDILOL SCH MG: 3.12 TABLET, FILM COATED ORAL at 10:49

## 2017-05-07 RX ADMIN — GABAPENTIN SCH MG: 100 CAPSULE ORAL at 22:24

## 2017-05-07 RX ADMIN — GABAPENTIN SCH MG: 100 CAPSULE ORAL at 06:08

## 2017-05-07 RX ADMIN — ASPIRIN 81 MG SCH MG: 81 TABLET ORAL at 10:48

## 2017-05-07 RX ADMIN — INSULIN ASPART SCH: 100 INJECTION, SOLUTION INTRAVENOUS; SUBCUTANEOUS at 06:08

## 2017-05-07 RX ADMIN — POTASSIUM CHLORIDE SCH MEQ: 750 TABLET, EXTENDED RELEASE ORAL at 11:22

## 2017-05-07 RX ADMIN — INSULIN ASPART SCH UNITS: 100 INJECTION, SOLUTION INTRAVENOUS; SUBCUTANEOUS at 22:26

## 2017-05-07 RX ADMIN — POTASSIUM CHLORIDE SCH MEQ: 750 TABLET, EXTENDED RELEASE ORAL at 22:24

## 2017-05-07 RX ADMIN — RAMIPRIL SCH MG: 5 CAPSULE ORAL at 10:49

## 2017-05-07 RX ADMIN — PRAMIPEXOLE DIHYDROCHLORIDE SCH MG: 0.25 TABLET ORAL at 18:09

## 2017-05-07 RX ADMIN — INSULIN ASPART SCH UNITS: 100 INJECTION, SOLUTION INTRAVENOUS; SUBCUTANEOUS at 11:24

## 2017-05-07 RX ADMIN — PRAMIPEXOLE DIHYDROCHLORIDE SCH MG: 0.25 TABLET ORAL at 11:23

## 2017-05-07 RX ADMIN — INSULIN DETEMIR SCH UNITS: 100 INJECTION, SOLUTION SUBCUTANEOUS at 12:00

## 2017-05-07 RX ADMIN — FUROSEMIDE SCH MG: 10 INJECTION, SOLUTION INTRAVENOUS at 06:08

## 2017-05-07 RX ADMIN — PRAMIPEXOLE DIHYDROCHLORIDE SCH MG: 0.25 TABLET ORAL at 14:39

## 2017-05-07 RX ADMIN — INSULIN ASPART SCH UNITS: 100 INJECTION, SOLUTION INTRAVENOUS; SUBCUTANEOUS at 18:10

## 2017-05-07 RX ADMIN — HEPARIN SODIUM SCH UNIT: 5000 INJECTION, SOLUTION INTRAVENOUS; SUBCUTANEOUS at 10:48

## 2017-05-07 NOTE — PN
Progress Note, Physician


History of Present Illness: 


feels better


no cp





- Current Medication List


Current Medications: 


Active Medications





Acetaminophen (Tylenol -)  650 mg PO Q4H PRN


   PRN Reason: FEVER OR PAIN


Aspirin (Asa -)  81 mg PO DAILY [facility]


   Last Admin: 05/07/17 10:48 Dose:  81 mg


Atorvastatin Calcium (Lipitor -)  20 mg PO HS [facility]


   Last Admin: 05/06/17 21:25 Dose:  20 mg


Carbidopa/Levodopa (Sinemet *Cr* 25/100 -)  2 combo PO 0700,1100,1500,1900 [facility]


   Last Admin: 05/07/17 10:51 Dose:  2 combo


Carvedilol (Coreg -)  3.125 mg PO BID [facility]


   Last Admin: 05/07/17 10:49 Dose:  3.125 mg


Furosemide (Lasix Injection -)  40 mg IVPUSH BID@0600,1400 [facility]


   Last Admin: 05/07/17 06:08 Dose:  40 mg


Gabapentin (Neurontin -)  100 mg PO TID [facility]


   Last Admin: 05/07/17 06:08 Dose:  100 mg


Heparin Sodium (Porcine) (Heparin -)  5,000 unit SQ BID [facility]


   Last Admin: 05/07/17 10:48 Dose:  5,000 unit


Insulin Aspart (Novolog Vial Sliding Scale -)  1 vial SQ ACHS [facility]


   PRN Reason: Protocol


   Last Admin: 05/07/17 11:24 Dose:  5 units


Insulin Detemir (Levemir Vial)  25 units SQ ACBK [facility]


Non-Formulary Medication (Linaclotide [Linzess])  290 mcg PO DAILY [facility]


Non-Formulary Medication (Naloxegol Oxalate [Movantik])  25 mg PO DAILY [facility]


Polyethylene Glycol (Miralax (For Daily Use) -)  34 gm PO BID [facility]


   Last Admin: 05/07/17 10:52 Dose:  17 gm


Potassium Chloride (K-Dur -)  10 meq PO BID [facility]


   Last Admin: 05/07/17 11:22 Dose:  10 meq


Pramipexole Dihydrochloride (Mirapex -)  0.75 mg PO HS [facility]


   Last Admin: 05/06/17 21:26 Dose:  0.75 mg


Pramipexole Dihydrochloride (Mirapex -)  0.25 mg PO 0700,1100,1500,1900 [facility]


   Last Admin: 05/07/17 11:23 Dose:  0.25 mg


Ramipril (Altace -)  5 mg PO DAILY [facility]


   Last Admin: 05/07/17 10:49 Dose:  5 mg


Sitagliptin Phosphate (Januvia -)  100 mg PO ACBK [facility]


   Last Admin: 05/07/17 06:08 Dose:  100 mg











- Objective


Vital Signs: 


 Vital Signs











Temperature  97.8 F   05/07/17 08:40


 


Pulse Rate  70   05/07/17 08:40


 


Respiratory Rate  18   05/07/17 06:00


 


Blood Pressure  117/57   05/07/17 08:40


 


O2 Sat by Pulse Oximetry (%)  96   05/06/17 21:00











Cardiovascular: Yes: Regular Rate and Rhythm


Respiratory: Yes: Regular, CTA Bilaterally


Gastrointestinal: Yes: Normal Bowel Sounds, Soft.  No: Tenderness


Edema: Yes


Labs: 


 CBC, BMP





 05/06/17 05:35 





 05/07/17 10:35 











Problem List





- Problems


(1) CHF (congestive heart failure)


Assessment/Plan: 


IV LASIX


ECHO NOTED MOD LV DYSFUNCTION--EF 41%


MONITOR LABS


ADD COREG AND ALTACE


CXR


Code(s): I50.9 - HEART FAILURE, UNSPECIFIED   Qualifiers: 


     Congestive heart failure type: systolic     Congestive heart failure 

chronicity: acute on chronic        Qualified Code(s): I50.23 - Acute on 

chronic systolic (congestive) heart failure  





(2) CAD (coronary artery disease)


Code(s): I25.10 - ATHSCL HEART DISEASE OF NATIVE CORONARY ARTERY W/O ANG PCTRS 

  





(3) Constipation


Assessment/Plan: 


GI ON CASE


ENEMA


PT TOOK CITRATE OF MAG ON HIS OWN


Code(s): K59.00 - CONSTIPATION, UNSPECIFIED   Qualifiers: 


     Constipation type: unspecified constipation type     Qualified Code(s): 

K59.00 - Constipation, unspecified  





(4) Diabetes


Assessment/Plan: 


BGM





Code(s): E11.9 - TYPE 2 DIABETES MELLITUS WITHOUT COMPLICATIONS   Qualifiers: 


     Diabetes mellitus type: type 2





(5) Parkinson disease


Assessment/Plan: 


SAME MEDS


NEURO CONSULT NOTED


Code(s): G20 - PARKINSON'S DISEASE

## 2017-05-07 NOTE — PN
GI Progress Note


Subjective: 


abdominal pain and distention resolved, good results with Miralax and Linzess





- Objective


Vital Signs: 


 Vital Signs











Temperature  98.0 F   05/07/17 18:00


 


Pulse Rate  71   05/07/17 18:00


 


Respiratory Rate  18   05/07/17 18:00


 


Blood Pressure  120/85   05/07/17 18:00


 


O2 Sat by Pulse Oximetry (%)  95   05/07/17 09:00











Constitutional: Well Nourished


Eyes: Yes: Conjunctiva Clear


HENT: Yes: Atraumatic


Neck: Yes: Supple


Cardiovascular: Yes: Regular Rate and Rhythm


Respiratory: Yes: CTA Bilaterally


...Palpate: Yes: Soft.  No: Firm/Rigid, Guarding, Hepatomegaly, Mass, Pulsatile 

Mass, Splenomegaly, Tenderness


Labs: 


 CBC, BMP





 05/06/17 05:35 





 05/07/17 10:35 





 Hepatic Panel











Total Bilirubin  0.6 mg/dL (0.2-1.0)   05/06/17  05:35    


 


AST  26 U/L (15-37)   05/06/17  05:35    


 


ALT  29 U/L (12-78)  D 05/06/17  05:35    


 


Alkaline Phosphatase  81 U/L ()   05/06/17  05:35    


 


Albumin  3.8 g/dl (3.4-5.0)   05/06/17  05:35    














Problem List





- Problems


(1) Constipation by delayed colonic transit


Assessment/Plan: 


R> contnue Miralax and Linzess


Code(s): K59.01 - SLOW TRANSIT CONSTIPATION

## 2017-05-07 NOTE — PN
Progress Note, Physician


Chief Complaint: 


Pt A&Ox3; Denies dyspnea or chest pain.


History of Present Illness: 


1-year-old male (collins Moore), presents to the ED with complaints of 

constipation for the past week unrelieved with over-the-counter medications. 

Patient c/o ncreased weakness, decreased appetite, increased shortness of 

breath with minimal exertion. Patient mentions lower extremity edema (R>L) 

despite being on Lasix for his CHF. Patient denies chest pain, fever, chills, 

nausea or dysuria, palpitations, or cough. Patient states history of Parkinson's

, CHF, CAD, COPD, diabetes, hypertension, and anxiety, dyslipidemia. Patient 

also states history of pacemaker done recently at [facility].


Timing/Duration: 1 week


Severity: moderate


Associated Symptoms: reports: shortness of breath, weakness








- Current Medication List


Current Medications: 


Active Medications





Acetaminophen (Tylenol -)  650 mg PO Q4H PRN


   PRN Reason: FEVER OR PAIN


Aspirin (Asa -)  81 mg PO DAILY [facility]


   Last Admin: 05/06/17 09:11 Dose:  81 mg


Atorvastatin Calcium (Lipitor -)  20 mg PO HS [facility]


   Last Admin: 05/06/17 21:25 Dose:  20 mg


Carbidopa/Levodopa (Sinemet *Cr* 25/100 -)  2 combo PO 0700,1100,1500,1900 [facility]


Carvedilol (Coreg -)  3.125 mg PO BID [facility]


   Last Admin: 05/06/17 21:28 Dose:  3.125 mg


Furosemide (Lasix Injection -)  40 mg IVPUSH BID@0600,1400 [facility]


   Last Admin: 05/07/17 06:08 Dose:  40 mg


Gabapentin (Neurontin -)  100 mg PO TID [facility]


   Last Admin: 05/07/17 06:08 Dose:  100 mg


Heparin Sodium (Porcine) (Heparin -)  5,000 unit SQ BID [facility]


   Last Admin: 05/06/17 21:23 Dose:  5,000 unit


Insulin Aspart (Novolog Vial Sliding Scale -)  1 vial SQ ACHS [facility]


   PRN Reason: Protocol


   Last Admin: 05/07/17 06:08 Dose:  Not Given


Insulin Detemir (Levemir Vial)  40 units SQ AM [facility]


   Last Admin: 05/07/17 07:09 Dose:  Not Given


Non-Formulary Medication (Linaclotide [Linzess])  290 mcg PO DAILY [facility]


Non-Formulary Medication (Mirabegron [Myrbetriq])  50 mg PO DAILY [facility]


Non-Formulary Medication (Naloxegol Oxalate [Movantik])  25 mg PO DAILY [facility]


Polyethylene Glycol (Miralax (For Daily Use) -)  34 gm PO BID [facility]


   Last Admin: 05/06/17 21:28 Dose:  Not Given


Potassium Chloride (K-Dur -)  10 meq PO BID [facility]


   Last Admin: 05/06/17 21:25 Dose:  10 meq


Pramipexole Dihydrochloride (Mirapex -)  0.75 mg PO HS [facility]


   Last Admin: 05/06/17 21:26 Dose:  0.75 mg


Pramipexole Dihydrochloride (Mirapex -)  0.25 mg PO 0700,1100,1500,1900 [facility]


Ramipril (Altace -)  5 mg PO DAILY [facility]


Sitagliptin Phosphate (Januvia -)  100 mg PO ACBK [facility]


   Last Admin: 05/07/17 06:08 Dose:  100 mg











- Objective


Vital Signs: 


 Vital Signs











Temperature  97.8 F   05/07/17 08:40


 


Pulse Rate  70   05/07/17 08:40


 


Respiratory Rate  18   05/07/17 06:00


 


Blood Pressure  117/57   05/07/17 08:40


 


O2 Sat by Pulse Oximetry (%)  96   05/06/17 21:00











Constitutional: Yes: Calm


Eyes: Yes: WNL


HENT: Yes: WNL


Neck: Yes: WNL


Cardiovascular: Yes: S1, S2 (split)


Respiratory: Yes: Regular


Gastrointestinal: Yes: Soft


...Rectal Exam: Yes: Deferred


Genitourinary: Yes: Anuria, Urethral Discharge


Musculoskeletal: Yes: WNL


Extremities: Yes: WNL


Edema: No


Peripheral Pulses WNL: Yes


Integumentary: Yes: WNL


Neurological: Yes: Alert, Oriented


Psychiatric: Yes: Alert, Oriented


Labs: 


 CBC, BMP





 05/06/17 05:35 





 05/06/17 05:35 





 Abnormal Lab Results











  05/07/17





  10:35


 


BUN  43 H


 


Creatinine  1.5 H


 


Random Glucose  234 H D


 


Magnesium  2.8 H D














- ....Imaging


Other: Image Reviewed (telemetry periods of ventricular pacing; no arrhythmias)





Problem List





- Problems


(1) CAD (coronary artery disease)


Assessment/Plan: 


f/u prior cardiac workup reports.


(Pt describes studies and procedures done in [place], as well).


Code(s): I25.10 - ATHSCL HEART DISEASE OF NATIVE CORONARY ARTERY W/O ANG PCTRS 

  





(2) Constipation


Code(s): K59.00 - CONSTIPATION, UNSPECIFIED   Qualifiers: 


     Constipation type: unspecified constipation type     Qualified Code(s): 

K59.00 - Constipation, unspecified  





(3) Diabetes


Assessment/Plan: 


Consider new class of medication (eg Jardience) for potential further reduction 

in cardiac events.


On ACEI.


Code(s): E11.9 - TYPE 2 DIABETES MELLITUS WITHOUT COMPLICATIONS   Qualifiers: 


     Diabetes mellitus type: type 2





(4) HTN (hypertension)


Code(s): I10 - ESSENTIAL (PRIMARY) HYPERTENSION   





(5) Parkinson disease


Assessment/Plan: 


f/u with neurologist (Dr. Borjas).


Code(s): G20 - PARKINSON'S DISEASE





(6) Acute on chronic systolic (congestive) heart failure


Assessment/Plan: 


Still with bilateral LE edema, dyspnea on mild exertion, JVD.


Continue beta blocker, ACEI; furosemide; the latter may be increased to 80 mg 

bid if BUN/Cr, electrolytes allow.


Code(s): I50.23 - ACUTE ON CHRONIC SYSTOLIC (CONGESTIVE) HEART FAILURE





(7) History of permanent cardiac pacemaker placement


Code(s): Z95.0 - PRESENCE OF CARDIAC PACEMAKER





(8) Renal insufficiency


Assessment/Plan: 


F/u BUN/Cr, electrolytes, Is and Os.


Code(s): N28.9 - DISORDER OF KIDNEY AND URETER, UNSPECIFIED

## 2017-05-08 LAB
ALBUMIN SERPL-MCNC: 3.8 G/DL (ref 3.4–5)
ALP SERPL-CCNC: 77 U/L (ref 45–117)
ALT SERPL-CCNC: 20 U/L (ref 12–78)
ANION GAP SERPL CALC-SCNC: 7 MMOL/L (ref 8–16)
AST SERPL-CCNC: 24 U/L (ref 15–37)
BILIRUB SERPL-MCNC: 0.6 MG/DL (ref 0.2–1)
CALCIUM SERPL-MCNC: 8.7 MG/DL (ref 8.5–10.1)
CO2 SERPL-SCNC: 31 MMOL/L (ref 21–32)
COCKROFT - GAULT: 62.99
CREAT SERPL-MCNC: 1.4 MG/DL (ref 0.7–1.3)
GLUCOSE SERPL-MCNC: 125 MG/DL (ref 74–106)
PROT SERPL-MCNC: 6.7 G/DL (ref 6.4–8.2)

## 2017-05-08 RX ADMIN — POLYETHYLENE GLYCOL 3350 SCH GM: 17 POWDER, FOR SOLUTION ORAL at 22:51

## 2017-05-08 RX ADMIN — GABAPENTIN SCH MG: 100 CAPSULE ORAL at 21:17

## 2017-05-08 RX ADMIN — POLYETHYLENE GLYCOL 3350 SCH GM: 17 POWDER, FOR SOLUTION ORAL at 09:57

## 2017-05-08 RX ADMIN — SITAGLIPTIN SCH MG: 100 TABLET, FILM COATED ORAL at 06:03

## 2017-05-08 RX ADMIN — FUROSEMIDE SCH MG: 10 INJECTION, SOLUTION INTRAVENOUS at 14:44

## 2017-05-08 RX ADMIN — INSULIN ASPART SCH: 100 INJECTION, SOLUTION INTRAVENOUS; SUBCUTANEOUS at 21:25

## 2017-05-08 RX ADMIN — POTASSIUM CHLORIDE SCH MEQ: 750 TABLET, EXTENDED RELEASE ORAL at 09:55

## 2017-05-08 RX ADMIN — PRAMIPEXOLE DIHYDROCHLORIDE SCH MG: 0.25 TABLET ORAL at 17:18

## 2017-05-08 RX ADMIN — HEPARIN SODIUM SCH UNIT: 5000 INJECTION, SOLUTION INTRAVENOUS; SUBCUTANEOUS at 09:56

## 2017-05-08 RX ADMIN — PRAMIPEXOLE DIHYDROCHLORIDE SCH MG: 0.25 TABLET ORAL at 10:01

## 2017-05-08 RX ADMIN — PRAMIPEXOLE DIHYDROCHLORIDE SCH MG: 0.25 TABLET ORAL at 14:46

## 2017-05-08 RX ADMIN — POTASSIUM CHLORIDE SCH MEQ: 750 TABLET, EXTENDED RELEASE ORAL at 21:17

## 2017-05-08 RX ADMIN — PRAMIPEXOLE DIHYDROCHLORIDE SCH MG: 0.25 TABLET ORAL at 06:02

## 2017-05-08 RX ADMIN — GABAPENTIN SCH MG: 100 CAPSULE ORAL at 14:45

## 2017-05-08 RX ADMIN — INSULIN ASPART SCH UNITS: 100 INJECTION, SOLUTION INTRAVENOUS; SUBCUTANEOUS at 12:20

## 2017-05-08 RX ADMIN — HEPARIN SODIUM SCH UNIT: 5000 INJECTION, SOLUTION INTRAVENOUS; SUBCUTANEOUS at 21:18

## 2017-05-08 RX ADMIN — CARVEDILOL SCH MG: 3.12 TABLET, FILM COATED ORAL at 09:55

## 2017-05-08 RX ADMIN — INSULIN ASPART SCH UNITS: 100 INJECTION, SOLUTION INTRAVENOUS; SUBCUTANEOUS at 16:14

## 2017-05-08 RX ADMIN — ATORVASTATIN CALCIUM SCH MG: 20 TABLET, FILM COATED ORAL at 21:17

## 2017-05-08 RX ADMIN — RAMIPRIL SCH MG: 5 CAPSULE ORAL at 09:55

## 2017-05-08 RX ADMIN — ASPIRIN 81 MG SCH MG: 81 TABLET ORAL at 09:56

## 2017-05-08 RX ADMIN — PRAMIPEXOLE DIHYDROCHLORIDE SCH: 0.25 TABLET ORAL at 18:44

## 2017-05-08 RX ADMIN — FUROSEMIDE SCH MG: 10 INJECTION, SOLUTION INTRAVENOUS at 06:03

## 2017-05-08 RX ADMIN — INSULIN DETEMIR SCH: 100 INJECTION, SOLUTION SUBCUTANEOUS at 07:36

## 2017-05-08 RX ADMIN — PRAMIPEXOLE DIHYDROCHLORIDE SCH MG: 0.25 TABLET ORAL at 22:51

## 2017-05-08 RX ADMIN — GABAPENTIN SCH MG: 100 CAPSULE ORAL at 06:03

## 2017-05-08 RX ADMIN — CARVEDILOL SCH MG: 3.12 TABLET, FILM COATED ORAL at 21:18

## 2017-05-08 RX ADMIN — INSULIN ASPART SCH: 100 INJECTION, SOLUTION INTRAVENOUS; SUBCUTANEOUS at 06:04

## 2017-05-08 NOTE — PN
Progress Note, Physician


History of Present Illness: 


Pt alert. No dyspnea or chest pain. No palpitations. Sitting out of bed in 

chair.





- Current Medication List


Current Medications: 


Active Medications





Acetaminophen (Tylenol -)  650 mg PO Q4H PRN


   PRN Reason: FEVER OR PAIN


Aspirin (Asa -)  81 mg PO DAILY [facility]


   Last Admin: 05/08/17 09:56 Dose:  81 mg


Atorvastatin Calcium (Lipitor -)  20 mg PO HS [facility]


   Last Admin: 05/07/17 22:24 Dose:  20 mg


Carbidopa/Levodopa (Sinemet *Cr* 25/100 -)  2 combo PO 0700,1100,1500,1900 [facility]


   Last Admin: 05/08/17 17:19 Dose:  2 combo


Carvedilol (Coreg -)  3.125 mg PO BID [facility]


   Last Admin: 05/08/17 09:55 Dose:  3.125 mg


Furosemide (Lasix Injection -)  40 mg IVPUSH BID@0600,1400 [facility]


   Last Admin: 05/08/17 14:44 Dose:  40 mg


Gabapentin (Neurontin -)  100 mg PO TID [facility]


   Last Admin: 05/08/17 14:45 Dose:  100 mg


Heparin Sodium (Porcine) (Heparin -)  5,000 unit SQ BID [facility]


   Last Admin: 05/08/17 09:56 Dose:  5,000 unit


Insulin Aspart (Novolog Vial Sliding Scale -)  1 vial SQ ACHS [facility]


   PRN Reason: Protocol


   Last Admin: 05/08/17 16:14 Dose:  10 units


Insulin Detemir (Levemir Vial)  25 units SQ ACBK [facility]


   Last Admin: 05/08/17 07:36 Dose:  Not Given


Non-Formulary Medication (Linaclotide [Linzess])  290 mcg PO DAILY [facility]


Non-Formulary Medication (Naloxegol Oxalate [Movantik])  25 mg PO DAILY [facility]


Polyethylene Glycol (Miralax (For Daily Use) -)  34 gm PO BID [facility]


   Last Admin: 05/08/17 09:57 Dose:  34 gm


Potassium Chloride (K-Dur -)  10 meq PO BID [facility]


   Last Admin: 05/08/17 09:55 Dose:  10 meq


Pramipexole Dihydrochloride (Mirapex -)  0.75 mg PO HS [facility]


   Last Admin: 05/07/17 22:25 Dose:  0.75 mg


Pramipexole Dihydrochloride (Mirapex -)  0.25 mg PO 0700,1100,1500,1900 [facility]


   Last Admin: 05/08/17 17:18 Dose:  0.25 mg


Ramipril (Altace -)  5 mg PO DAILY [facility]


   Last Admin: 05/08/17 09:55 Dose:  5 mg


Sitagliptin Phosphate (Januvia -)  100 mg PO ACBK [facility]


   Last Admin: 05/08/17 06:03 Dose:  100 mg











- Objective


Vital Signs: 


 Vital Signs











Temperature  96.3 F L  05/08/17 17:00


 


Pulse Rate  60   05/08/17 17:00


 


Respiratory Rate  20   05/08/17 17:00


 


Blood Pressure  120/64   05/08/17 17:00


 


O2 Sat by Pulse Oximetry (%)  98   05/08/17 10:00











Constitutional: Yes: No Distress


Eyes: No: Sclera Icterus


HENT: Yes: Atraumatic, Normocephalic


Neck: Yes: Supple, Trachea Midline


Cardiovascular: Yes: Regular Rate and Rhythm.  No: JVD


Respiratory: Yes: Rales (few rales right base)


Labs: 


 CBC, BMP





 05/06/17 05:35 





 05/08/17 05:35 











Problem List





- Problems


(1) Acute on chronic systolic (congestive) heart failure


Code(s): I50.23 - ACUTE ON CHRONIC SYSTOLIC (CONGESTIVE) HEART FAILURE





(2) Constipation by delayed colonic transit


Code(s): K59.01 - SLOW TRANSIT CONSTIPATION





(3) COPD (chronic obstructive pulmonary disease)


Code(s): J44.9 - CHRONIC OBSTRUCTIVE PULMONARY DISEASE, UNSPECIFIED   








Assessment/Plan


71 year old man admitted with heart failure. PMH significant for CAD, Parkinson'

s and mild obstructive airway disease. Pt is improved post tx with increased 

diuretic. Respiratory status remains stable.





Suggest: continue current treatments


Albuterol nebulizer q 4 hours prn


Maintain SaO2 > 90





T

## 2017-05-08 NOTE — PN
Progress Note, Physician


History of Present Illness: 


seen and examined today in nad. no overnight events. no new complaints.








- Current Medication List


Current Medications: 


Active Medications





Acetaminophen (Tylenol -)  650 mg PO Q4H PRN


   PRN Reason: FEVER OR PAIN


Aspirin (Asa -)  81 mg PO DAILY [facility]


   Last Admin: 05/08/17 09:56 Dose:  81 mg


Atorvastatin Calcium (Lipitor -)  20 mg PO HS [facility]


   Last Admin: 05/07/17 22:24 Dose:  20 mg


Carbidopa/Levodopa (Sinemet *Cr* 25/100 -)  2 combo PO 0700,1100,1500,1900 [facility]


   Last Admin: 05/08/17 10:01 Dose:  2 combo


Carvedilol (Coreg -)  3.125 mg PO BID [facility]


   Last Admin: 05/08/17 09:55 Dose:  3.125 mg


Furosemide (Lasix Injection -)  40 mg IVPUSH BID@0600,1400 [facility]


   Last Admin: 05/08/17 06:03 Dose:  40 mg


Gabapentin (Neurontin -)  100 mg PO TID [facility]


   Last Admin: 05/08/17 06:03 Dose:  100 mg


Heparin Sodium (Porcine) (Heparin -)  5,000 unit SQ BID [facility]


   Last Admin: 05/08/17 09:56 Dose:  5,000 unit


Insulin Aspart (Novolog Vial Sliding Scale -)  1 vial SQ ACHS [facility]


   PRN Reason: Protocol


   Last Admin: 05/08/17 06:04 Dose:  Not Given


Insulin Detemir (Levemir Vial)  25 units SQ ACBK [facility]


   Last Admin: 05/08/17 07:36 Dose:  Not Given


Non-Formulary Medication (Linaclotide [Linzess])  290 mcg PO DAILY [facility]


Non-Formulary Medication (Naloxegol Oxalate [Movantik])  25 mg PO DAILY [facility]


Polyethylene Glycol (Miralax (For Daily Use) -)  34 gm PO BID [facility]


   Last Admin: 05/08/17 09:57 Dose:  34 gm


Potassium Chloride (K-Dur -)  10 meq PO BID [facility]


   Last Admin: 05/08/17 09:55 Dose:  10 meq


Pramipexole Dihydrochloride (Mirapex -)  0.75 mg PO HS [facility]


   Last Admin: 05/07/17 22:25 Dose:  0.75 mg


Pramipexole Dihydrochloride (Mirapex -)  0.25 mg PO 0700,1100,1500,1900 [facility]


   Last Admin: 05/08/17 10:01 Dose:  0.25 mg


Ramipril (Altace -)  5 mg PO DAILY [facility]


   Last Admin: 05/08/17 09:55 Dose:  5 mg


Sitagliptin Phosphate (Januvia -)  100 mg PO ACBK [facility]


   Last Admin: 05/08/17 06:03 Dose:  100 mg











- Objective


Vital Signs: 


 Vital Signs











Temperature  97.7 F   05/08/17 06:00


 


Pulse Rate  62   05/08/17 06:00


 


Respiratory Rate  18   05/08/17 06:00


 


Blood Pressure  131/59   05/08/17 06:00


 


O2 Sat by Pulse Oximetry (%)  99   05/07/17 21:00











Constitutional: Yes: No Distress, Calm, Obese


Eyes: Yes: Conjunctiva Clear, EOM Intact, PERRL


HENT: Yes: Atraumatic, Normocephalic


Neck: Yes: Supple, Trachea Midline


Cardiovascular: Yes: Regular Rate and Rhythm, S1, S2.  No: Bradycardia, 

Tachycardia, Pulse Irregular, Bruit, JVD, Gallop, Murmur, Rub, S3, S4, 

Varicosities


Respiratory: Yes: Regular, Diminished, Rales, Rhonchi.  No: SOB, Wheezes


Gastrointestinal: Yes: Normal Bowel Sounds, Soft.  No: Distention, Tenderness


Musculoskeletal: Yes: Muscle Weakness


Extremities: Yes: WNL


Edema: Yes


Edema: LLE: 2+, RLE: 2+


Peripheral Pulses WNL: Yes


Peripheral Pulses: Left Doralis Pedis: 2+, Right Dorsalis Pedis: 2+


Integumentary: Yes: WNL


Neurological: Yes: Alert, Oriented


Psychiatric: Yes: Alert, Oriented


Labs: 


 CBC, BMP





 05/06/17 05:35 





 05/08/17 05:35 











- ....Imaging


Chest X-ray: Report Reviewed, Image Reviewed


EKG: Report Reviewed, Image Reviewed


Other: Report Reviewed, Image Reviewed (tele-nsr, vpaced)





Assessment/Plan


71 year old man with a history of HTN, HLD, DM, CAD s/p PCI LAD 2008 and again 

2013, repeat cardiac cath 10/2014 showed patent LAD stents an only non-

obstructive CAD in the other coronary territories, PPM,  Chronic diastolic HF, 

Parkinsons disease, non-adherent with follow up, has not been to the office in 

several years, last seen here in the hospital 2 years ago with c/o dizziness, 

he has since undergone a PPM placement at an outside facility now admitted with 

constipation, sob, edema. 





SOB/Edema-acute on chronic systolic HF-chronic LE edema, likely component of 

chronic venous insufficiency


-as bun/creat are rising and Cxr shows no sig pulm edema, would transition back 

to po Lasix


-persistent LE edema likely due to chronic venous insufficiency, would recc leg 

elevation and consideration for compression stockings


-echo shows moderate LV systolic dysfx


-ok from a cardiac standpoint for discharge home with close outpatient follow up


-if remains inpatient can dc tele





Dizziness-chronic, multiple possible etiologies, likely related to Parkinsons 

and possible orthostatic hypotension


-PPM functioning appropriately on telemetry review


-neuro evaluation for Parkinsons disease appreciated


-carotid doppler done 4/14/15-no sign of atherosclerotic disease on either side

, no evidence of hemodynamically sig internal carotid artery stenosis on either 

side


-check orthostatic BP


-no sig arrhythmias on telemetry during admission





CAD-prior PCI LAD 2008 and 2013, cath 2014 with no sig restenosis and otherwise 

non-obs CAD


-cont with home medical regimen


-would be helpful to review results of any tests done at outside cardiologist 

office, pt did not know name of the other cardiologist he was seeing


-cont ASA, Lipitor, Coreg, Ramipril

## 2017-05-09 VITALS — DIASTOLIC BLOOD PRESSURE: 61 MMHG | SYSTOLIC BLOOD PRESSURE: 136 MMHG

## 2017-05-09 VITALS — HEART RATE: 60 BPM | TEMPERATURE: 97.6 F

## 2017-05-09 RX ADMIN — GABAPENTIN SCH MG: 100 CAPSULE ORAL at 06:12

## 2017-05-09 RX ADMIN — INSULIN DETEMIR SCH UNITS: 100 INJECTION, SOLUTION SUBCUTANEOUS at 06:21

## 2017-05-09 RX ADMIN — RAMIPRIL SCH MG: 5 CAPSULE ORAL at 09:54

## 2017-05-09 RX ADMIN — POTASSIUM CHLORIDE SCH MEQ: 750 TABLET, EXTENDED RELEASE ORAL at 09:56

## 2017-05-09 RX ADMIN — HEPARIN SODIUM SCH UNIT: 5000 INJECTION, SOLUTION INTRAVENOUS; SUBCUTANEOUS at 09:55

## 2017-05-09 RX ADMIN — PRAMIPEXOLE DIHYDROCHLORIDE SCH MG: 0.25 TABLET ORAL at 11:30

## 2017-05-09 RX ADMIN — PRAMIPEXOLE DIHYDROCHLORIDE SCH MG: 0.25 TABLET ORAL at 06:50

## 2017-05-09 RX ADMIN — GABAPENTIN SCH MG: 100 CAPSULE ORAL at 14:04

## 2017-05-09 RX ADMIN — CARVEDILOL SCH MG: 3.12 TABLET, FILM COATED ORAL at 09:55

## 2017-05-09 RX ADMIN — PRAMIPEXOLE DIHYDROCHLORIDE SCH MG: 0.25 TABLET ORAL at 15:38

## 2017-05-09 RX ADMIN — INSULIN ASPART SCH: 100 INJECTION, SOLUTION INTRAVENOUS; SUBCUTANEOUS at 06:14

## 2017-05-09 RX ADMIN — FUROSEMIDE SCH MG: 10 INJECTION, SOLUTION INTRAVENOUS at 06:12

## 2017-05-09 RX ADMIN — INSULIN ASPART SCH UNITS: 100 INJECTION, SOLUTION INTRAVENOUS; SUBCUTANEOUS at 11:33

## 2017-05-09 RX ADMIN — SITAGLIPTIN SCH MG: 100 TABLET, FILM COATED ORAL at 06:21

## 2017-05-09 RX ADMIN — ASPIRIN 81 MG SCH MG: 81 TABLET ORAL at 09:55

## 2017-05-09 RX ADMIN — POLYETHYLENE GLYCOL 3350 SCH GM: 17 POWDER, FOR SOLUTION ORAL at 11:29

## 2017-05-09 NOTE — PN
Progress Note, Physician


Chief Complaint: 


no distress


Feels well


Denies chest pain or SOB





- Current Medication List


Current Medications: 


Active Medications





Acetaminophen (Tylenol -)  650 mg PO Q4H PRN


   PRN Reason: FEVER OR PAIN


Aspirin (Asa -)  81 mg PO DAILY [facility]


   Last Admin: 05/08/17 09:56 Dose:  81 mg


Atorvastatin Calcium (Lipitor -)  20 mg PO HS [facility]


   Last Admin: 05/08/17 21:17 Dose:  20 mg


Carbidopa/Levodopa (Sinemet *Cr* 25/100 -)  2 combo PO 0700,1100,1500,1900 [facility]


   Last Admin: 05/09/17 06:19 Dose:  2 combo


Carvedilol (Coreg -)  3.125 mg PO BID [facility]


   Last Admin: 05/08/17 21:18 Dose:  3.125 mg


Furosemide (Lasix -)  40 mg PO BID@0600,1400 [facility]


Gabapentin (Neurontin -)  100 mg PO TID [facility]


   Last Admin: 05/09/17 06:12 Dose:  100 mg


Heparin Sodium (Porcine) (Heparin -)  5,000 unit SQ BID [facility]


   Last Admin: 05/08/17 21:18 Dose:  5,000 unit


Insulin Aspart (Novolog Vial Sliding Scale -)  1 vial SQ ACHS [facility]


   PRN Reason: Protocol


   Last Admin: 05/09/17 06:14 Dose:  Not Given


Insulin Detemir (Levemir Vial)  25 units SQ ACBK [facility]


   Last Admin: 05/09/17 06:21 Dose:  25 units


Non-Formulary Medication (Linaclotide [Linzess])  290 mcg PO DAILY [facility]


Non-Formulary Medication (Naloxegol Oxalate [Movantik])  25 mg PO DAILY [facility]


Polyethylene Glycol (Miralax (For Daily Use) -)  34 gm PO BID [facility]


   Last Admin: 05/08/17 22:51 Dose:  34 gm


Potassium Chloride (K-Dur -)  10 meq PO BID [facility]


   Last Admin: 05/08/17 21:17 Dose:  10 meq


Pramipexole Dihydrochloride (Mirapex -)  0.75 mg PO HS [facility]


   Last Admin: 05/08/17 22:51 Dose:  0.75 mg


Pramipexole Dihydrochloride (Mirapex -)  0.25 mg PO 0700,1100,1500,1900 [facility]


   Last Admin: 05/09/17 06:50 Dose:  0.25 mg


Ramipril (Altace -)  5 mg PO DAILY [facility]


   Last Admin: 05/08/17 09:55 Dose:  5 mg


Sitagliptin Phosphate (Januvia -)  100 mg PO ACBK [facility]


   Last Admin: 05/09/17 06:21 Dose:  100 mg











- Objective


Vital Signs: 


 Vital Signs











Temperature  97.5 F L  05/09/17 06:00


 


Pulse Rate  71   05/09/17 06:00


 


Respiratory Rate  20   05/09/17 06:00


 


Blood Pressure  138/72   05/09/17 06:00


 


O2 Sat by Pulse Oximetry (%)  98   05/08/17 21:00











Constitutional: Yes: No Distress


Cardiovascular: Yes: Regular Rate and Rhythm


Respiratory: Yes: CTA Bilaterally


Gastrointestinal: Yes: Soft


Edema: Yes


Edema: LLE: 1+, RLE: 1+


Neurological: Yes: Alert


...Motor Strength: WNL


Labs: 


 CBC, BMP





 05/06/17 05:35 





 05/08/17 05:35 





 Laboratory Tests











  05/08/17





  05:35


 


Potassium  4.3


 


BUN  43 H


 


Creatinine  1.4 H














Assessment/Plan


Assessment/Plan


71 year old man with a history of HTN, HLD, DM, CAD s/p PCI LAD 2008 and again 

2013, repeat cardiac cath 10/2014 showed patent LAD stents an only non-

obstructive CAD in the other coronary territories, PPM,  Chronic diastolic HF, 

Parkinsons disease, non-adherent with follow up, has not been to the office in 

several years, last seen here in the hospital 2 years ago with c/o dizziness, 

he has since undergone a PPM placement at an outside facility now admitted with 

constipation, sob, edema. 





SOB/Edema-acute on chronic systolic HF-chronic LE edema, likely component of 

chronic venous insufficiency


-as bun/creat are rising and Cxr shows no sig pulm edema, now stable on PO Lasix


-persistent LE edema likely due to chronic venous insufficiency, would recc leg 

elevation and consideration for compression stockings


-echo shows moderate LV systolic dysfx


-ok for discharge from cardiac standpoint





Dizziness-chronic, multiple possible etiologies, likely related to Parkinsons 

and possible orthostatic hypotension


-PPM functioning appropriately on telemetry review


-neuro evaluation for Parkinsons disease appreciated


-carotid doppler done 4/14/15-no sign of atherosclerotic disease on either side

, no evidence of hemodynamically sig internal carotid artery stenosis on either 

side








CAD-prior PCI LAD 2008 and 2013, cath 2014 with no sig restenosis and otherwise 

non-obs CAD


-cont with home medical regimen


-cont ASA, Lipitor, Coreg, Ramipril

## 2017-05-09 NOTE — DS
Physical Examination


Vital Signs: 


 Vital Signs











Temperature  97.5 F L  05/09/17 06:00


 


Pulse Rate  71   05/09/17 06:00


 


Respiratory Rate  20   05/09/17 06:00


 


Blood Pressure  138/72   05/09/17 06:00


 


O2 Sat by Pulse Oximetry (%)  98   05/08/17 21:00








comfortable, nad


Constitutional: Yes: No Distress


Eyes: Yes: WNL


HENT: Yes: WNL


Neck: Yes: WNL


Cardiovascular: Yes: Murmur


Respiratory: Yes: Diminished, On Nasal O2


Gastrointestinal: Yes: WNL


Renal/: Yes: WNL


Musculoskeletal: Yes: Muscle Weakness


Extremities: Yes: WNL


Edema: Yes


Edema: LLE: 2+, RLE: 2+


Peripheral Pulses WNL: Yes


Integumentary: Yes: WNL


Wound/Incision: Yes: Clean/Dry


Neurological: Yes: Pre-Existing Deficit, Unsteady Gait, Weakness


...Motor Strength: LLE, RLE


Psychiatric: Yes: Other


Labs: 


 CBC, BMP





 05/06/17 05:35 





 05/08/17 05:35 











Discharge Summary


Reason For Visit: DIZZINESS,LIGHTHEADEDNESS,CHF


Current Active Problems





Acute on chronic systolic (congestive) heart failure (Acute) 


Bifascicular block (Acute) 


CAD (coronary artery disease) (Acute) 


CHF (congestive heart failure) (Acute) 


COPD (chronic obstructive pulmonary disease) (Acute) 


Constipation (Acute) 


Constipation by delayed colonic transit (Acute) 


Diabetes (Acute) 


Dizziness (Acute) 


HTN (hypertension) (Acute) 


History of permanent cardiac pacemaker placement (Acute) 


Lightheaded (Acute) 


Parkinson disease (Acute) 


Renal insufficiency (Acute) 








Procedures: Principal: echo


Other Procedures: labs


Hospital Course: 


acute on chronic diastolic heart failure, dm, copd asthma, here with dyspnea, 

was given iv lasix for diuresis down 8lbs, improved symptoms however will need 

cardio-pulmonary rehab at [facility].


Condition: Fair





- Instructions


Diet, Activity, Other Instructions: 


ada low sodium


Referrals: 


Donnie Jones MD [Primary Care Provider] - 


Disposition: SKILLED NURSING FACILITY





- Home Medications


Comprehensive Discharge Medication List: 


Ambulatory Orders





Furosemide [Lasix -] 40 mg PO DAILY 12/30/16 


Gabapentin 100 mg PO TID 12/30/16 


Linagliptin [Tradjenta] 5 mg PO DAILY 12/30/16 


Mirabegron [Myrbetriq] 50 mg PO DAILY 12/30/16 


Atorvastatin Ca [Lipitor] 20 mg PO DAILY 05/04/17 


Carbidopa/Levodopa [Carbidopa-Levodopa  Tab] 1 each PO Q4H 05/04/17 


Insulin Glargine,Hum.rec.anlog [Lantus (nf)] 50 units SQ AM 05/04/17 


Linaclotide [Linzess] 290 mcg PO DAILY 05/04/17 


Naloxegol Oxalate [Movantik] 25 mg PO DAILY 05/04/17 


Potassium Chloride [Klor-Con 10] 10 meq PO BID 05/04/17 


Pramipexole Di-HCl [Pramipexole Dihydrochloride] 0.75 mg PO ASDIR 05/04/17

## 2017-05-09 NOTE — PN
Progress Note (short form)





- Note


Progress Note: 


PATIENT SEEN AND EXAMINED WILL DC HOME WITH VNS CARE, F/U AS OUTPATIENT IN 1 

WEEK





Problem List





- Problems


(1) Acute on chronic systolic (congestive) heart failure


Code(s): I50.23 - ACUTE ON CHRONIC SYSTOLIC (CONGESTIVE) HEART FAILURE





(2) CAD (coronary artery disease)


Code(s): I25.10 - ATHSCL HEART DISEASE OF NATIVE CORONARY ARTERY W/O ANG PCTRS 

  





(3) CHF (congestive heart failure)


Code(s): I50.9 - HEART FAILURE, UNSPECIFIED   Qualifiers: 


     Congestive heart failure type: systolic     Congestive heart failure 

chronicity: acute on chronic        Qualified Code(s): I50.23 - Acute on 

chronic systolic (congestive) heart failure  





(4) COPD (chronic obstructive pulmonary disease)


Code(s): J44.9 - CHRONIC OBSTRUCTIVE PULMONARY DISEASE, UNSPECIFIED   





(5) Diabetes


Code(s): E11.9 - TYPE 2 DIABETES MELLITUS WITHOUT COMPLICATIONS   Qualifiers: 


     Diabetes mellitus type: type 2





(6) History of permanent cardiac pacemaker placement


Code(s): Z95.0 - PRESENCE OF CARDIAC PACEMAKER





(7) Parkinson disease


Code(s): G20 - PARKINSON'S DISEASE





(8) Renal insufficiency


Code(s): N28.9 - DISORDER OF KIDNEY AND URETER, UNSPECIFIED

## 2019-09-12 ENCOUNTER — HOSPITAL ENCOUNTER (EMERGENCY)
Dept: HOSPITAL 74 - JER | Age: 74
Discharge: HOME | End: 2019-09-12
Payer: COMMERCIAL

## 2019-09-12 VITALS — HEART RATE: 82 BPM | DIASTOLIC BLOOD PRESSURE: 63 MMHG | TEMPERATURE: 97.6 F | SYSTOLIC BLOOD PRESSURE: 138 MMHG

## 2019-09-12 VITALS — BODY MASS INDEX: 33.3 KG/M2

## 2019-09-12 DIAGNOSIS — R05: Primary | ICD-10-CM

## 2019-09-12 DIAGNOSIS — E11.9: ICD-10-CM

## 2019-09-12 DIAGNOSIS — I10: ICD-10-CM

## 2019-09-12 DIAGNOSIS — E78.00: ICD-10-CM

## 2019-09-12 DIAGNOSIS — Z95.0: ICD-10-CM

## 2019-09-12 DIAGNOSIS — Z95.5: ICD-10-CM

## 2019-09-12 LAB
ALBUMIN SERPL-MCNC: 3.9 G/DL (ref 3.4–5)
ALP SERPL-CCNC: 106 U/L (ref 45–117)
ALT SERPL-CCNC: 34 U/L (ref 13–61)
ANION GAP SERPL CALC-SCNC: 6 MMOL/L (ref 8–16)
APPEARANCE UR: CLEAR
AST SERPL-CCNC: 24 U/L (ref 15–37)
BASOPHILS # BLD: 0.9 % (ref 0–2)
BILIRUB SERPL-MCNC: 0.7 MG/DL (ref 0.2–1)
BILIRUB UR STRIP.AUTO-MCNC: NEGATIVE MG/DL
BNP SERPL-MCNC: 554.4 PG/ML (ref 5–125)
BUN SERPL-MCNC: 33.3 MG/DL (ref 7–18)
CALCIUM SERPL-MCNC: 9.4 MG/DL (ref 8.5–10.1)
CHLORIDE SERPL-SCNC: 103 MMOL/L (ref 98–107)
CO2 SERPL-SCNC: 31 MMOL/L (ref 21–32)
COLOR UR: YELLOW
CREAT SERPL-MCNC: 1.4 MG/DL (ref 0.55–1.3)
DEPRECATED RDW RBC AUTO: 15.8 % (ref 11.9–15.9)
EOSINOPHIL # BLD: 3.1 % (ref 0–4.5)
GLUCOSE SERPL-MCNC: 201 MG/DL (ref 74–106)
HCT VFR BLD CALC: 43.5 % (ref 35.4–49)
HGB BLD-MCNC: 13.9 GM/DL (ref 11.7–16.9)
KETONES UR QL STRIP: NEGATIVE
LEUKOCYTE ESTERASE UR QL STRIP.AUTO: NEGATIVE
LYMPHOCYTES # BLD: 18.8 % (ref 8–40)
MCH RBC QN AUTO: 25.2 PG (ref 25.7–33.7)
MCHC RBC AUTO-ENTMCNC: 31.9 G/DL (ref 32–35.9)
MCV RBC: 79.1 FL (ref 80–96)
MONOCYTES # BLD AUTO: 8.6 % (ref 3.8–10.2)
NEUTROPHILS # BLD: 68.6 % (ref 42.8–82.8)
NITRITE UR QL STRIP: NEGATIVE
PH UR: 5 [PH] (ref 5–8)
PLATELET # BLD AUTO: 170 K/MM3 (ref 134–434)
PMV BLD: 11.1 FL (ref 7.5–11.1)
POTASSIUM SERPLBLD-SCNC: 4.1 MMOL/L (ref 3.5–5.1)
PROT SERPL-MCNC: 7.2 G/DL (ref 6.4–8.2)
PROT UR QL STRIP: (no result)
PROT UR QL STRIP: NEGATIVE
RBC # BLD AUTO: 5.5 M/MM3 (ref 4–5.6)
SODIUM SERPL-SCNC: 140 MMOL/L (ref 136–145)
SP GR UR: 1.01 (ref 1.01–1.03)
UROBILINOGEN UR STRIP-MCNC: 0.2 MG/DL (ref 0.2–1)
WBC # BLD AUTO: 10.1 K/MM3 (ref 4–10)

## 2019-09-12 NOTE — PDOC
Attending Attestation





- Resident


Resident Name: Moraima Saunders





- ED Attending Attestation


I have performed the following: I have examined & evaluated the patient, The 

case was reviewed & discussed with the resident, I agree w/resident's findings 

& plan





- HPI


HPI: 





09/12/19 18:39


72y/o M h/o parkinson's, BPH, CHF presents for urinary incontinence with cough 

for 2 days. Pt recently seen by Dr. YENNIFER Jones, reportedly diagnosed with 

pharyngitis and treated with abx (? cipro), now with cough for a few days 

without cp/calderón, but reporting urinary spilling with cough today. no abd fullness

/pain, but reports decreased UOP over the last 24h (usually urinates 4-6x/day 

on lasix, but only once today). 








- Physicial Exam


PE: 





09/12/19 18:42


vss


well appearing, parkinson's, ambulating independently with walker


no jaundice/pallor


s1s2 rrr


bibasilar crackles with exp wheeze but no focally decreased breath sounds to 

prolonged expiration


abd benign, no palpable bladder distension, no cvat


3+ pitting edema b/l








- Medical Decision Making





09/12/19 18:43


72y/o M with presents with volume overload with pulmonary congestion (? baseline

) with decreased uop today, ? urinary retention leading to overflow 

incontinence with valsalva/cough. 


labs, ua


post-void residual by u/s or straight cath


cxr, ekg


reassess, dispo after discussion with Dr. YENNIFER Jones - pt has appt with his PCP 

tomorrow








**Heart Score/ECG Review


  ** #1


ECG reviewed & interpreted by me at: 15:09





09/12/19 18:45


v-paced at 72. no secondary sign of acute ischemic change

## 2019-09-12 NOTE — PDOC
Rapid Medical Evaluation


Chief Complaint: Urinary Problem


Time Seen by Provider: 09/12/19 14:45


Medical Evaluation: 


 Allergies











Allergy/AdvReac Type Severity Reaction Status Date / Time


 


No Known Drug Allergies Allergy   Verified 09/12/19 14:44











09/12/19 14:47


73 year old male c/o cough worsening. patient reports that he is urinating on 

himself with thye severity of cough





Dr. vu: neurology





DrHarvey Jones: PCP





Dr. Kilo Stephens: urology





patient alert ox 3





A: cough





P: chest xray





ekg





ua





urine culture





**Discharge Disposition





- Diagnosis


 Cough








- Referrals





- Patient Instructions





- Post Discharge Activity

## 2019-09-12 NOTE — PDOC
History of Present Illness





- General


Chief Complaint: Respiratory


Stated Complaint: URINARY PROBLEM


Time Seen by Provider: 09/12/19 14:45


History Source: Patient


Exam Limitations: Other (very poor historian)





- History of Present Illness


Initial Comments: 


Pt is a 72 yo M, with PMH of HTN, HLD, DM, PD, COPD, CAD and CHF (with 

pacemaker and on lasix) and known bladder incontinence, who is presenting from 

home by car for complaints of incontinence while coughing x1 week. Pt states he 

was recently on antibiotics (ciprofloxacin 500 mg BID) for a "throat infection" 

for the cough, which he has been taking as prescribed. Pt states he has some 

incontinence at baseline, but that he came to the ER because he has been 

urinating on himself with coughing more from baseline. Pt denies any fevers/

chills, headache, vision changes, syncope, chest pain, palpitations, SOB, 

orthopnea, nausea/vomiting, abdominal pain, urinary burning or urgency, diarrhea

/constipation, or leg swelling that is changed from baseline.





Allergies: JON


PCP: Dr. Jones


Urology: Dr. Diamond


GI: Dr. Hudson





Social: Pt denies any cigarette, alcohol, or drug use.


Pt denies any recent travel or sick contacts.


Surgical: pacemaker placement, bladder?


Family: no relevant history.


09/12/19 17:19


09/12/19 17:45








Past History





- Travel


Traveled outside of the country in the last 30 days: No


Close contact w/someone who was outside of country & ill: No





- Past Medical History


Allergies/Adverse Reactions: 


 Allergies











Allergy/AdvReac Type Severity Reaction Status Date / Time


 


No Known Drug Allergies Allergy   Verified 09/12/19 14:44











Home Medications: 


Ambulatory Orders





Mirabegron [Myrbetriq] 50 mg PO DAILY 12/30/16 


Atorvastatin Ca [Lipitor] 20 mg PO DAILY 05/04/17 


Carbidopa/Levodopa [Carbidopa-Levodopa  Tab] 1 each PO ASDIR 05/04/17 


Potassium Chloride [Klor-Con 10] 10 meq PO BID 05/04/17 


Pramipexole Di-HCl [Pramipexole Dihydrochloride] 0.5 mg PO ASDIR 05/04/17 


Acetaminophen [Tylenol .Regular Strength -] 650 mg PO Q4H PRN #0 tablet 05/09/ 17 


Aspirin [ASA -] 81 mg PO DAILY  tab.chew 05/09/17 


Carvedilol [Coreg -] 3.125 mg PO BID  tablet 05/09/17 


Furosemide [Lasix -] 40 mg PO BID@0600,1400  tablet 05/09/17 


Allopurinol [Zyloprim -] 100 mg PO DAILY 09/12/19 


Dexlansoprazole [Dexilant] 60 mg PO DAILY 09/12/19 


Insulin Glargine,Hum.rec.anlog [Basaglar Kwikpen U-100] 45 unit SQ DAILY 09/12/ 19 








Anemia: No


Asthma: No


Cancer: No


Cardiac Disorders: Yes (PACEMAKER)


CVA: No


COPD: No


CHF: Yes


Dementia: No


Diabetes: Yes


GI Disorders: Yes (GALLBLADDER)


 Disorders: Yes (ENLARGED PROSTATE)


HTN: Yes


Hypercholesterolemia: Yes


Liver Disease: No


Seizures: No


Thyroid Disease: No





- Surgical History


Abdominal Surgery: No


Appendectomy: No


Cardiac Surgery: Yes (STENTS X2,PACEMAKER)


Cholecystectomy: No


Lung Surgery: No


Neurologic Surgery: No


Orthopedic Surgery: No





- Immunization History


Immunization Up to Date: Yes





- Suicide/Smoking/Psychosocial Hx


Smoking History: Never smoked


Have you smoked in the past 12 months: No


Hx Alcohol Use: No


Drug/Substance Use Hx: No


Substance Use Type: None


Hx Substance Use Treatment: No





**Review of Systems





- Review of Systems


Able to Perform ROS?: Yes


Is the patient limited English proficient: No


Constitutional: Yes: Weight Stable.  No: Chills, Diaphoresis, Fever, Loss of 

Appetite, Malaise, Weakness


HEENTM: Yes: Throat Pain.  No: Recent change in vision, Nose Congestion, Throat 

Swelling, Difficulty Swallowing


Respiratory: Yes: See HPI, Cough.  No: Orthopnea, Shortness of Breath, Wheezing

, Productive cough, Hemoptysis


Cardiac (ROS): No: Chest Pain, Edema, Irregular Heart Rate, Lightheadedness, 

Palpitations, Syncope, Chest Tightness


ABD/GI: No: Constipated, Diarrhea, Nausea, Poor Appetite, Poor Fluid Intake, 

Vomiting


: Yes: See HPI, Incontinence.  No: Burning, Dysuria, Frequency, Flank Pain, 

Urgency


Musculoskeletal: No: Back Pain, Joint Pain, Muscle Pain, Muscle Weakness


Integumentary: No: Rash


Neurological: No: Headache, Numbness, Weakness, Unsteady Gait (pt ambulatory 

with walker at baseline), Dizziness


Psychiatric: No: Sleep Pattern Change, Change in Appetite


Endocrine: No: Increased Urine, Change in Weight


Hematologic/Lymphatic: No: Anemia, Blood Clots, Easy Bleeding, Easy Bruising


All Other Systems: Reviewed and Negative





*Physical Exam





- Vital Signs


 Last Vital Signs











Temp Pulse Resp BP Pulse Ox


 


 97.6 F   82   18   138/63   97 


 


 09/12/19 14:45  09/12/19 14:45  09/12/19 14:45  09/12/19 14:45  09/12/19 14:45














- Physical Exam


Comments: 


Vitals stable, pt afebrile. Pt in NAD, sitting comfortably and has been 

ambulatory in ED with walker. Obese body habitus. 


Pt alert and oriented x3.


CNs generally intact, muscular strength and sensation intact. 


No midline spinal tenderness, step-offs, or crepitus. 


Head normocephalic, atraumatic.


Eyes PERRLA, EOMI. Oropharynx without erythema or exudates, no LAD b/l. 


No nasal congestion, hearing intact. 


+b/l pedal edema to mid shins (pt states baseline). Clear heart sounds, S1/S2, 

no JVD, or heart murmur. 


Coarse breath sounds at the bases, with no wheezes or accessory muscle use. 


No abdominal or CVA tenderness to palpation, no rebound, no guarding. Abdomen 

soft, protuberant, and with normoactive bowel sounds. 


Skin without jaundice or rash. 


09/12/19 17:36


09/12/19 17:44








ED Treatment Course





- LABORATORY


CBC & Chemistry Diagram: 


 09/12/19 18:10





 09/12/19 18:10





- ADDITIONAL ORDERS


Additional order review: 


 Laboratory  Results











  09/12/19





  14:19


 


Urine Color  Yellow


 


Urine Appearance  Clear


 


Urine pH  5.0


 


Ur Specific Gravity  1.010


 


Urine Protein  Negative


 


Urine Glucose (UA)  Trace


 


Urine Ketones  Negative


 


Urine Blood  Negative


 


Urine Nitrite  Negative


 


Urine Bilirubin  Negative


 


Urine Urobilinogen  0.2


 


Ur Leukocyte Esterase  Negative














Medical Decision Making





- Medical Decision Making


Pt was seen at bedside, also will be seen by attending Dr. Gonsales. Pt 

presenting with complaints of incontinence while coughing x1 week. Chest x-ray 

and UA were done in [facility]. Sent rapid stress test for pts concern over "throat 

infection". Will evaluate for cough (troponin, BNP, elecrolytes).


Unclear etiology of cough, but chest x-ray showed no new congestion or changes 

from prior. 





UA negative for infection.


Pending rapid strep test. 


09/12/19 17:47





Rapid strep test negative.


09/12/19 17:48


09/12/19 18:25





EKG: Ventricular paced rhythm with PVCs


CBC and CMP within pt baseline


Trop <.03


Bedside US showed residual bladder volume of 100, no need for straight cath at 

this point.


Attempted to call Dr. Jones's office with no answering service. Pt has a f/u 

appointment with Dr. Jones tomorrow morning.


Pt can be discharged to home with PCP f/u. Strict return precautions provided 

with pt understanding.


09/12/19 19:28








*DC/Admit/Observation/Transfer


Diagnosis at time of Disposition: 


 Cough








- Discharge Dispostion


Disposition: HOME


Condition at time of disposition: Good


Decision to Admit order: No





- Referrals


Referrals: 


Donnie Jones MD [Primary Care Provider] - 





- Patient Instructions


Printed Discharge Instructions:  DI for Cough -- Adult


Additional Instructions: 


You were seen in the ER today for cough. The results of your labs and imaging 

today were normal. Please follow-up with your primary care doctor within 1-2 

days to discuss your visit and make sure your symptoms have improved. Please 

return to the ER if you have any worsening pain, development of fevers or chills

, loss of consciousness, inability to tolerate food or fluids, or any other 

concerns.








- Post Discharge Activity

## 2019-09-13 NOTE — EKG
Test Reason : 

Blood Pressure : ***/*** mmHG

Vent. Rate : 072 BPM     Atrial Rate : 071 BPM

   P-R Int : 000 ms          QRS Dur : 116 ms

    QT Int : 422 ms       P-R-T Axes : 000 -28 209 degrees

   QTc Int : 462 ms

 

Ventricular-paced rhythm WITH PREMATURE VENTRICULAR OR ABERRANTLY

CONDUCTED COMPLEXES

ABNORMAL ECG

WHEN COMPARED WITH ECG OF 05-MAY-2017 08:56,

VENT. RATE HAS INCREASED BY   4 BPM

*** POOR DATA QUALITY, INTERPRETATION MAY BE ADVERSELY AFFECTED

Confirmed by NAI MENDEZ MD ([id number]) on 9/13/2019 2:07:23 PM

 

Referred By:             Confirmed By:NAI MENDEZ MD

## 2020-01-29 ENCOUNTER — HOSPITAL ENCOUNTER (EMERGENCY)
Dept: HOSPITAL 74 - JER | Age: 75
Discharge: HOME | End: 2020-01-29
Payer: COMMERCIAL

## 2020-01-29 VITALS — TEMPERATURE: 98 F | DIASTOLIC BLOOD PRESSURE: 60 MMHG | SYSTOLIC BLOOD PRESSURE: 110 MMHG | HEART RATE: 64 BPM

## 2020-01-29 VITALS — BODY MASS INDEX: 39.9 KG/M2

## 2020-01-29 DIAGNOSIS — N40.0: Primary | ICD-10-CM

## 2020-01-29 DIAGNOSIS — Z95.0: ICD-10-CM

## 2020-01-29 DIAGNOSIS — I25.10: ICD-10-CM

## 2020-01-29 DIAGNOSIS — E78.5: ICD-10-CM

## 2020-01-29 DIAGNOSIS — I10: ICD-10-CM

## 2020-01-29 LAB
ALBUMIN SERPL-MCNC: 3.6 G/DL (ref 3.4–5)
ALP SERPL-CCNC: 114 U/L (ref 45–117)
ALT SERPL-CCNC: 17 U/L (ref 13–61)
ANION GAP SERPL CALC-SCNC: 6 MMOL/L (ref 8–16)
AST SERPL-CCNC: 41 U/L (ref 15–37)
BASOPHILS # BLD: 0.8 % (ref 0–2)
BILIRUB SERPL-MCNC: 0.8 MG/DL (ref 0.2–1)
BUN SERPL-MCNC: 29.4 MG/DL (ref 7–18)
CALCIUM SERPL-MCNC: 9.2 MG/DL (ref 8.5–10.1)
CHLORIDE SERPL-SCNC: 105 MMOL/L (ref 98–107)
CO2 SERPL-SCNC: 28 MMOL/L (ref 21–32)
CREAT SERPL-MCNC: 1.2 MG/DL (ref 0.55–1.3)
DEPRECATED RDW RBC AUTO: 16.5 % (ref 11.9–15.9)
EOSINOPHIL # BLD: 2 % (ref 0–4.5)
GLUCOSE SERPL-MCNC: 309 MG/DL (ref 74–106)
HCT VFR BLD CALC: 38.4 % (ref 35.4–49)
HGB BLD-MCNC: 12.4 GM/DL (ref 11.7–16.9)
LYMPHOCYTES # BLD: 15.4 % (ref 8–40)
MAGNESIUM SERPL-MCNC: 2.5 MG/DL (ref 1.8–2.4)
MCH RBC QN AUTO: 25.5 PG (ref 25.7–33.7)
MCHC RBC AUTO-ENTMCNC: 32.2 G/DL (ref 32–35.9)
MCV RBC: 79.2 FL (ref 80–96)
MONOCYTES # BLD AUTO: 8.3 % (ref 3.8–10.2)
NEUTROPHILS # BLD: 73.5 % (ref 42.8–82.8)
PLATELET # BLD AUTO: 172 K/MM3 (ref 134–434)
PMV BLD: 11.1 FL (ref 7.5–11.1)
POTASSIUM SERPLBLD-SCNC: 5.5 MMOL/L (ref 3.5–5.1)
PROT SERPL-MCNC: 6.9 G/DL (ref 6.4–8.2)
RBC # BLD AUTO: 4.84 M/MM3 (ref 4–5.6)
SODIUM SERPL-SCNC: 138 MMOL/L (ref 136–145)
WBC # BLD AUTO: 7 K/MM3 (ref 4–10)

## 2020-01-29 NOTE — PDOC
Rapid Medical Evaluation


Time Seen by Provider: 01/29/20 12:59


Medical Evaluation: 


 Allergies











Allergy/AdvReac Type Severity Reaction Status Date / Time


 


No Known Drug Allergies Allergy   Verified 09/12/19 14:44











01/29/20 13:09





CC: Headache, ble weakness





PE: bibasilar fine crackles. Pitting edema ble. Cuttyhunk negative.





Orders: cardiac w/u with [facility]





Patient will proceed to the ED for further evaluation.





**Discharge Disposition





- Diagnosis


 CHF (congestive heart failure)








- Referrals





- Patient Instructions





- Post Discharge Activity
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
## 2020-01-30 LAB — BNP SERPL-MCNC: 865.8 PG/ML (ref 5–125)

## 2020-01-30 NOTE — EKG
Test Reason : 

Blood Pressure : ***/*** mmHG

Vent. Rate : 065 BPM     Atrial Rate : 065 BPM

   P-R Int : 000 ms          QRS Dur : 120 ms

    QT Int : 432 ms       P-R-T Axes : 024 -62 101 degrees

   QTc Int : 449 ms

 

*** POOR DATA QUALITY, INTERPRETATION MAY BE ADVERSELY AFFECTED

Ventricular-paced rhythm

ABNORMAL ECG

WHEN COMPARED WITH ECG OF 12-SEP-2019 15:09,

VENT. RATE HAS DECREASED BY   7 BPM

Confirmed by AMERICO MONTE, NITA ([id number]) on 1/30/2020 12:42:20 PM

 

Referred By:             Confirmed By:NITA DRISCOLL MD

## 2020-08-04 ENCOUNTER — HOSPITAL ENCOUNTER (INPATIENT)
Dept: HOSPITAL 74 - JER | Age: 75
LOS: 3 days | Discharge: NURSING, REHAB OR LONG-TERM CARE | DRG: 57 | End: 2020-08-07
Attending: FAMILY MEDICINE | Admitting: FAMILY MEDICINE
Payer: COMMERCIAL

## 2020-08-04 VITALS — BODY MASS INDEX: 29.1 KG/M2

## 2020-08-04 DIAGNOSIS — F03.90: ICD-10-CM

## 2020-08-04 DIAGNOSIS — G20: Primary | ICD-10-CM

## 2020-08-04 DIAGNOSIS — M54.17: ICD-10-CM

## 2020-08-04 DIAGNOSIS — E11.65: ICD-10-CM

## 2020-08-04 DIAGNOSIS — M10.9: ICD-10-CM

## 2020-08-04 DIAGNOSIS — E66.9: ICD-10-CM

## 2020-08-04 DIAGNOSIS — N40.0: ICD-10-CM

## 2020-08-04 DIAGNOSIS — R26.9: ICD-10-CM

## 2020-08-04 DIAGNOSIS — E11.40: ICD-10-CM

## 2020-08-04 DIAGNOSIS — M48.07: ICD-10-CM

## 2020-08-04 DIAGNOSIS — G25.81: ICD-10-CM

## 2020-08-04 DIAGNOSIS — I50.9: ICD-10-CM

## 2020-08-04 DIAGNOSIS — I25.10: ICD-10-CM

## 2020-08-04 LAB
ALBUMIN SERPL-MCNC: 3.6 G/DL (ref 3.4–5)
ALP SERPL-CCNC: 95 U/L (ref 45–117)
ALT SERPL-CCNC: 11 U/L (ref 13–61)
ANION GAP SERPL CALC-SCNC: 6 MMOL/L (ref 8–16)
AST SERPL-CCNC: 36 U/L (ref 15–37)
BASOPHILS # BLD: 0.7 % (ref 0–2)
BILIRUB SERPL-MCNC: 0.8 MG/DL (ref 0.2–1)
BNP SERPL-MCNC: 648.5 PG/ML (ref 5–125)
BUN SERPL-MCNC: 23 MG/DL (ref 7–18)
CALCIUM SERPL-MCNC: 9.1 MG/DL (ref 8.5–10.1)
CHLORIDE SERPL-SCNC: 102 MMOL/L (ref 98–107)
CO2 SERPL-SCNC: 31 MMOL/L (ref 21–32)
CREAT SERPL-MCNC: 1.1 MG/DL (ref 0.55–1.3)
DEPRECATED RDW RBC AUTO: 16.9 % (ref 11.9–15.9)
EOSINOPHIL # BLD: 2.2 % (ref 0–4.5)
GLUCOSE SERPL-MCNC: 217 MG/DL (ref 74–106)
HCT VFR BLD CALC: 41.6 % (ref 35.4–49)
HGB BLD-MCNC: 13.1 GM/DL (ref 11.7–16.9)
INR BLD: 1.39 (ref 0.83–1.09)
LYMPHOCYTES # BLD: 14 % (ref 8–40)
MAGNESIUM SERPL-MCNC: 2.2 MG/DL (ref 1.8–2.4)
MCH RBC QN AUTO: 25.7 PG (ref 25.7–33.7)
MCHC RBC AUTO-ENTMCNC: 31.5 G/DL (ref 32–35.9)
MCV RBC: 81.6 FL (ref 80–96)
MONOCYTES # BLD AUTO: 8.5 % (ref 3.8–10.2)
NEUTROPHILS # BLD: 74.6 % (ref 42.8–82.8)
PLATELET # BLD AUTO: 162 K/MM3 (ref 134–434)
PMV BLD: 11.7 FL (ref 7.5–11.1)
POTASSIUM SERPLBLD-SCNC: 5.2 MMOL/L (ref 3.5–5.1)
PROT SERPL-MCNC: 6.8 G/DL (ref 6.4–8.2)
PT PNL PPP: 16.4 SEC (ref 9.7–13)
RBC # BLD AUTO: 5.1 M/MM3 (ref 4–5.6)
SODIUM SERPL-SCNC: 139 MMOL/L (ref 136–145)
WBC # BLD AUTO: 9.1 K/MM3 (ref 4–10)

## 2020-08-04 PROCEDURE — U0003 INFECTIOUS AGENT DETECTION BY NUCLEIC ACID (DNA OR RNA); SEVERE ACUTE RESPIRATORY SYNDROME CORONAVIRUS 2 (SARS-COV-2) (CORONAVIRUS DISEASE [COVID-19]), AMPLIFIED PROBE TECHNIQUE, MAKING USE OF HIGH THROUGHPUT TECHNOLOGIES AS DESCRIBED BY CMS-2020-01-R: HCPCS

## 2020-08-04 RX ADMIN — ATORVASTATIN CALCIUM SCH MG: 20 TABLET, FILM COATED ORAL at 21:42

## 2020-08-04 RX ADMIN — ENOXAPARIN SODIUM SCH MG: 40 INJECTION SUBCUTANEOUS at 17:13

## 2020-08-04 RX ADMIN — Medication SCH EACH: at 21:42

## 2020-08-04 RX ADMIN — CARBIDOPA AND LEVODOPA SCH EACH: 25; 100 TABLET ORAL at 21:42

## 2020-08-04 RX ADMIN — PRAMIPEXOLE DIHYDROCHLORIDE SCH MG: 0.25 TABLET ORAL at 22:31

## 2020-08-04 RX ADMIN — CARVEDILOL SCH MG: 3.12 TABLET, FILM COATED ORAL at 21:42

## 2020-08-04 NOTE — PDOC
Attending Attestation





- Resident


Resident Name: Steffen Richards





- ED Attending Attestation


I have performed the following: I have examined & evaluated the patient, The 

case was reviewed & discussed with the resident, I agree w/resident's findings &

plan





- HPI


HPI: 





08/04/20 12:39


74-year-old male with history of advanced Parkinson's who ambulates with 

assistance and with walker at baseline presents for evaluation for progression 

of disease with increasing difficulty transferring and ambulating resulting in 

falls.  No recent infectious or dehydration complaints, denies any direct head 

injury, no recent fevers or chills.





- Physicial Exam


PE: 





08/04/20 12:40


Vital signs are within normal limits, afebrile


Alert, pleasant, elderly gentleman seated in stretcher, conversant


Head is atraumatic, C-spine with normal range of motion


Heart is regular, lungs are clear, abdomen benign


Trace pretibial edema, no calf tenderness


Neurological exam with resting tremor in the hands, subjectively decreased 

sensation in both hands, otherwise full range of motion





- Medical Decision Making





08/04/20 12:41


74-year-old male with advanced Parkinson's presents with progression of disease 

resulting in falls and inability to care for self at home even with assistance. 

Question progression of disease versus medication side effect, rule out acute 

infectious or metabolic process, rule out traumatic injury from fall.


Labs, urinalysis


EKG, chest x-ray


CT head, CT C-spine


Discussed with PCP, will likely need admission for PT/rehab placement





**Heart Score/ECG Review


  ** #1


ECG reviewed & interpreted by me at: 11:22


General ECG Interpretation: Normal Rate (av paced at 61), Normal Intervals (qtc 

450), No acute ischemic changes





Discharge





- Discharge Information


Problems reviewed: Yes


Clinical Impression/Diagnosis: 


 Leg weakness, bilateral, Parkinson disease





Fall


Qualifiers:


 Encounter type: initial encounter Qualified Code(s): W19.XXXA - Unspecified 

fall, initial encounter








- Follow up/Referral


Referrals: 


Donnie Jones MD [Primary Care Provider] - 





- Patient Discharge Instructions





- Post Discharge Activity

## 2020-08-04 NOTE — HP
Admitting History and Physical





- Primary Care Physician


PCP: Michael Torre





- Admission


Chief Complaint: Weakness


History of Present Illness: 





73Y/O M HX of parkinson's dementia. BPH, CHF, CAD, DM, HTN, HLD, CAD, pacemaker,

lumbosacral spinal stenosis, presents to the ER with progressive legs weakness. 

Started out 3 months, but the past 2 months, he is been getting weaker and 

weaker. The past few days, he is getting super weak, called PCP: Karen wynn and

they told him to come to the ED. He admitted to have visual hallucination which 

didn't disturb him, he also admitted to hearing noises that that is not that 

there. No suicidal though. He admitted to have urinary retention; last void was 

this morning. He denies fever, chills, N/V/D, chest pain, abdominal pain, but 

endorse back pain. He admitted to have falling at night when no one can help him

move around. He denies head hitting, LOC


History Source: Patient, Medical Record


Limitations to Obtaining History: No Limitations, Clinical Condition





- Past Medical History


Cardiovascular: Yes: AFIB, CAD, CHF, HTN, Hyperlipdemia, Other (ppm)





- Past Surgical History


Past Surgical History: Yes: Permanent Pacemaker





- Smoking History


Smoking history: Never smoked


Have you smoked in the past 12 months: No





- Alcohol/Substance Use


Hx Alcohol Use: Yes





- Social History


ADL: Independent


History of Recent Travel: No





Home Medications





- Allergies


Allergies/Adverse Reactions: 


                                    Allergies











Allergy/AdvReac Type Severity Reaction Status Date / Time


 


No Known Drug Allergies Allergy   Verified 08/04/20 10:16














- Home Medications


Home Medications: 


Ambulatory Orders





Mirabegron [Myrbetriq] 50 mg PO DAILY 12/30/16 


Atorvastatin Ca [Lipitor] 20 mg PO DAILY 05/04/17 


Carbidopa/Levodopa [Carbidopa-Levodopa  Tab] 1 each PO Q6H 05/04/17 


Potassium Chloride [Klor-Con 10] 10 meq PO DAILY 05/04/17 


Pramipexole Di-HCl [Pramipexole Dihydrochloride] 0.5 mg PO HS 05/04/17 


Acetaminophen [Tylenol .Regular Strength -] 650 mg PO Q4H PRN #0 tablet 05/09/17




Dexlansoprazole [Dexilant] 60 mg PO DAILY 09/12/19 


Carvedilol [Coreg -] 3.125 mg PO HS 08/04/20 


Citalopram Hydrobromide [Citalopram HBr] 20 mg PO DAILY 08/04/20 


Furosemide [Lasix -] 40 mg PO DAILY 08/04/20 


Insulin (Novolog) [Novolog] 0 units SQ ASDIR 08/04/20 


Insulin Glargine,Hum.rec.anlog [Basaglar Kwikpen U-100] 0 unit SQ DAILY 08/04/20




Linaclotide [Linzess] 290 mcg PO DAILY 08/04/20 


Mirabegron [Myrbetriq] 0 mg PO DAILY 08/04/20 


Pimavanserin Tartrate [Nuplazid] 34 mg PO DAILY 08/04/20 


Quetiapine Fumarate [Seroquel -] 25 mg PO HS 08/04/20 











Review of Systems





- Review of Systems


Constitutional: reports: Weakness


Eyes: reports: No Symptoms


HENT: reports: No Symptoms


Neck: reports: No Symptoms


Cardiovascular: reports: No Symptoms


Respiratory: reports: No Symptoms


Gastrointestinal: reports: No Symptoms


Genitourinary: reports: No Symptoms


Breasts: reports: No Symptoms Reported


Musculoskeletal: reports: Muscle Weakness


Integumentary: reports: No Symptoms


Neurological: reports: Parasthesia (BLLE)


Endocrine: reports: No Symptoms


Hematology/Lymphatic: reports: No Symptoms


Psychiatric: reports: No Symptoms





Physical Examination


Vital Signs: 


                                   Vital Signs











Temperature  98.3 F   08/04/20 10:16


 


Pulse Rate  62   08/04/20 15:18


 


Respiratory Rate  18   08/04/20 15:18


 


Blood Pressure  104/57 L  08/04/20 15:18


 


O2 Sat by Pulse Oximetry (%)  97   08/04/20 15:18











Constitutional: Yes: Well Nourished, No Distress, Calm


Cardiovascular: Yes: Regular Rate and Rhythm


Respiratory: Yes: Regular, CTA Bilaterally


Gastrointestinal: Yes: Normal Bowel Sounds, Soft, Abdomen, Obese


Labs: 


                                    CBC, BMP





                                 08/04/20 11:10 





                                 08/04/20 11:10 











Problem List





- Problems


(1) Spinal stenosis


Assessment/Plan: 


-Neurology consult


-Continue home meds


Problems reviewed: Yes   


Code(s): M48.00 - SPINAL STENOSIS, SITE UNSPECIFIED   





(2) Paresthesia


Assessment/Plan: 


-Continue home dose pramipexole- was hallucinating on higher dosages


-Trial of gabapentin if neurology agrees


Problems reviewed: Yes   


Code(s): R20.2 - PARESTHESIA OF SKIN   





(3) Leg weakness, bilateral


Assessment/Plan: 


-PT


-Physiatry consult


-SNF placement


Problems reviewed: Yes   


Code(s): R29.898 - OTH SYMPTOMS AND SIGNS INVOLVING THE MUSCULOSKELETAL SYSTEM  







(4) Parkinson disease


Assessment/Plan: 


-Continue Sinemet


-May continue home med Nuplazid


Problems reviewed: Yes   


Code(s): G20 - PARKINSON'S DISEASE   





(5) Diabetes


Assessment/Plan: 


-Recheck A1c


-BGM AC HS


-ISS


-Diabetic low sodium diet


Problems reviewed: Yes   


Code(s): E11.9 - TYPE 2 DIABETES MELLITUS WITHOUT COMPLICATIONS   


Qualifiers: 


   Diabetes mellitus type: type 2 





Assessment/Plan





See problem list

## 2020-08-04 NOTE — PDOC
History of Present Illness





- General


Chief Complaint: Weakness


Stated Complaint: WEAKNESS


Time Seen by Provider: 08/04/20 10:10





- History of Present Illness


Initial Comments: 





08/04/20 11:18


73Y/O M HX of parkinson's dementia. BPH, CHF, CAD, DM, HTN, HLD, CAD, pacemaker,

lumbosacral spinal stenosis, presents to the ER with progressive legs weakness. 

Started out 3 months, but the past 2 months, he is been getting weaker and wea

ker. The past few days, he is getting super weak, called PCP: Karen daily and 

they told him to come to the ED. He admitted to have visual hallucination which 

didn't disturb him, he also admitted to hearing noises that that is not that 

there. No suicidal though. He admitted to have urinary retention; last void was 

this morning. He denies fever, chills, N/V/D, chest pain, abdominal pain, but 

endorse back pain. He admitted to have falling at night when no one can help him

move around. He denies head hitting, LOC


 


Neurologist:Indio Payan


PCP: Dr Romero.  





PMHX: as in HPI


PSHX: 


Meds: home meds: Furosemide, statin, linzess, citalopram, quatiapine, 

pramxipexole + carbidopa , carvedilol, dexilant. novolog 


Allergies: none


Tob: denies


Etoh: denies


Rec drugs: denies





ROS


GENERAL/CONSTITUTIONAL: No fever or chills. No weakness.


HEAD, EYES, EARS, NOSE AND THROAT: No change in vision. No ear pain or 

discharge. No sore throat.


CARDIOVASCULAR: No chest pain or shortness of breath


RESPIRATORY: No cough, wheezing, or hemoptysis.


GASTROINTESTINAL: No nausea, vomiting, diarrhea or constipation.


GENITOURINARY: No dysuria, frequency, or change in urination.


MUSCULOSKELETAL: +legs swelling. No neck pain,+ back pain.


SKIN: No rash


NEUROLOGIC: No headache, vertigo, loss of consciousness, + change in 

strength/sensation.


ENDOCRINE: No increased thirst. No abnormal weight change


HEMATOLOGIC/LYMPHATIC: No anemia, easy bleeding, or history of blood clots.


ALLERGIC/IMMUNOLOGIC: No hives or skin allergy.





PE


GENERAL: Awake, alert, and fully oriented, in no acute distress


HEAD: No signs of trauma, normocephalic, atraumatic 


EYES: PERRLA, EOMI, sclera anicteric, conjunctiva clear


ENT: Auricles normal inspection, hearing grossly normal, nares patent, 

oropharynx clear without


exudates. Moist mucosa


NECK: no lymphadenopathy, JVD, or masses


LUNGS: No distress, speaks full sentences, clear to auscultation bilaterally    


HEART: Regular rate and rhythm, normal S1 and S2, no murmurs, rubs or gallops, 

peripheral pulses normal and equal bilaterally. 


ABDOMEN: Soft, nontender, normoactive bowel sounds.  No guarding, no rebound.  

No masses, burn scars 


EXTREMITIES : Normal inspection, limited range of motion, no pitting edema.  No 

clubbing or cyanosis. normal strength. rigid shuffling walk. Resting tremor


NEUROLOGICAL: Cranial nerves II through XII grossly intact.  Normal speech, 

limited gait with assistant, no focal sensorimotor deficits , AOx3


SKIN: Warm, Dry, normal turgor, no rashes or lesions noted


 


 


08/04/20 14:55








Past History





- Medical History


Allergies/Adverse Reactions: 


                                    Allergies











Allergy/AdvReac Type Severity Reaction Status Date / Time


 


No Known Drug Allergies Allergy   Verified 08/04/20 10:16











Home Medications: 


Ambulatory Orders





Mirabegron [Myrbetriq] 50 mg PO DAILY 12/30/16 


Atorvastatin Ca [Lipitor] 20 mg PO DAILY 05/04/17 


Pramipexole Di-HCl [Pramipexole Dihydrochloride] 0.5 mg PO HS 05/04/17 


Acetaminophen [Tylenol .Regular Strength -] 650 mg PO Q4H PRN #0 tablet 05/09/17




Carvedilol [Coreg -] 3.125 mg PO HS 08/04/20 


Citalopram Hydrobromide [Citalopram HBr] 20 mg PO DAILY 08/04/20 


Furosemide [Lasix -] 40 mg PO DAILY 08/04/20 


Pimavanserin Tartrate [Nuplazid] 34 mg PO DAILY 08/04/20 


Carbidopa/Levodopa 25/250 [Sinemet 25/250 -] 1 each PO 0600,1000,1400,1800  

tablet 08/06/20 


Cefuroxime Axetil [Ceftin -] 500 mg PO BID #14 tablet 08/06/20 


Enoxaparin [Lovenox -] 40 mg SQ DAILY  disp.syrin 08/06/20 


Insulin Sliding Scale [Novolog Vial Sliding Scale -] 1 vial SQ TIDAC  units 

08/06/20 


Lidocaine Patch Removal [Lidoderm Patch Removal] 1 each MC DAILY@2200  each 

08/06/20 


Pantoprazole Sodium [Protonix -] 40 mg PO DAILY  tablet.ec 08/06/20 


Polyethylene Glycol 3350 [Miralax 119 gm Btl -] 17 gm PO DAILY  bottle 08/06/20 


Pramipexole Dihydrochloride [Mirapex -] 0.25 mg PO BID@0600,1400  tablet 

08/06/20 


Quetiapine Fumarate [Seroquel -] 50 mg PO HS  tablet 08/06/20 


Sennosides [Senna -] 2 tab PO HS PRN  tablet 08/06/20 


Sitagliptin Phosphate [Januvia -] 50 mg PO DAILY@0700 #30 tablet 08/06/20 


metFORMIN HCL [Glucophage -] 850 mg PO BID@0700,1630  tablet 08/06/20 








Anemia: No


Asthma: No


Cancer: No


Cardiac Disorders: Yes (PACEMAKER)


CVA: No


COPD: No


CHF: Yes


Dementia: No


Diabetes: Yes


GI Disorders: Yes (GALLBLADDER)


 Disorders: Yes (ENLARGED PROSTATE)


HTN: Yes


Hypercholesterolemia: Yes


Liver Disease: No


Seizures: No


Thyroid Disease: No





- Surgical History


Abdominal Surgery: No


Appendectomy: No


Cardiac Surgery: Yes (STENTS X2,PACEMAKER)


Cholecystectomy: No


Lung Surgery: No


Neurologic Surgery: No


Orthopedic Surgery: No





- Immunization History


Immunization Up to Date: Yes





- Psycho-Social/Smoking History


Smoking History: Never smoked


Have you smoked in the past 12 months: No





- Substance Abuse Hx (Audit-C & DAST Scrn)


How often the patient has a drink containing alcohol: Never


Score: In Men: 4 or > Positive; In Women: 3 or > Positive: 0


Screen Result (Pos requires Nsg. Audit-10AR): Negative


In the last yr the pt used illegal drug/Rx for NonMed reason: No


Score:  Yes response is considered Positive: 0


Screen Result (Positive result requires Nsg. DAST-10): Negative





*Physical Exam





- Vital Signs


                                Last Vital Signs











Temp Pulse Resp BP Pulse Ox


 


 98.3 F   64   20   112/67   98 


 


 08/04/20 10:16  08/04/20 10:16  08/04/20 10:16  08/04/20 10:16  08/04/20 10:16














ED Treatment Course





- LABORATORY


CBC & Chemistry Diagram: 


                                 08/04/20 11:10





                                 08/05/20 06:30





Medical Decision Making





- Medical Decision Making





74 M with Parkinson Tremor (resting), Rigidity (cogwheel),Akinesia, 

Posture/equilibrium impairment presented with progressive leg weakness. 





EKG showed no concerning ST elevation, paced rhtym, vent rate 61 .


08/04/20 14:57


Lab showed no concerning problem.Normal WBC,  BUN is high at 23, but on the 

baseline. BNP is also high at 640, but not the highest. 





CTscan of the head and necks show no fx, bleeding. 





Called Dr. Jones but He is not in the office. Called Dr. Torre twice, but unsuc

cessful 


08/04/20 15:00





08/04/20 15:01


Plan is to admit for inpatient to set up rehab or nursing home. 


08/04/20 15:36


Has been calling [facility] office twice, called Dr. Jones once. [place] from two 

office has noticed the call and request. Awaiting phone call. 


08/04/20 15:59


Dr. Torre called back , admitted for AMS with unsteady gait. 


08/04/20 16:00








Discharge





- Discharge Information


Problems reviewed: Yes


Clinical Impression/Diagnosis: 


 Leg weakness, bilateral, Parkinson disease





Fall


Qualifiers:


 Encounter type: initial encounter Qualified Code(s): W19.XXXA - Unspecified 

fall, initial encounter





Condition: Stable


Disposition: SKILLED NURSING FACILITY





- Follow up/Referral





- Patient Discharge Instructions





- Post Discharge Activity

## 2020-08-04 NOTE — PDOC
Rapid Medical Evaluation


Time Seen by Provider: 08/04/20 10:10


Medical Evaluation: 


                                    Allergies











Allergy/AdvReac Type Severity Reaction Status Date / Time


 


No Known Drug Allergies Allergy   Verified 01/29/20 13:15











08/04/20 10:10


I have performed a brief in-person evaluation of this patient.





The patient presents with a chief complaint of:h/o BPH, HTN, HLD, CHF, CAD, PPM,

DM, lumbosacral spine stenosis, Parkinson's Disease, ambulates w/ walker at 

baseline and BIB family member with complaint that pt is ambulating more slowly 

than baseline due to legs feeling weak x few days per pt. No acute back pain, 

saddle anesthesia or B/B incontinence. No acute sxs otherwise. Pt was seen for 

similar sxs 1/20 and discharged from ED after neg w/u





PMD: Dr Jones


Neuro: [place]





Pertinent physical exam findings:well mynor and stable





I have ordered the following:labs





The patient will proceed to the ED for further evaluation.














**Discharge Disposition





- Diagnosis


 Leg weakness, bilateral








- Referrals





- Patient Instructions





- Post Discharge Activity

## 2020-08-05 LAB
ANION GAP SERPL CALC-SCNC: 7 MMOL/L (ref 8–16)
BUN SERPL-MCNC: 22.5 MG/DL (ref 7–18)
CALCIUM SERPL-MCNC: 9.2 MG/DL (ref 8.5–10.1)
CHLORIDE SERPL-SCNC: 104 MMOL/L (ref 98–107)
CO2 SERPL-SCNC: 30 MMOL/L (ref 21–32)
CREAT SERPL-MCNC: 1 MG/DL (ref 0.55–1.3)
GLUCOSE SERPL-MCNC: 194 MG/DL (ref 74–106)
POTASSIUM SERPLBLD-SCNC: 4.3 MMOL/L (ref 3.5–5.1)
SODIUM SERPL-SCNC: 140 MMOL/L (ref 136–145)

## 2020-08-05 RX ADMIN — CITALOPRAM HYDROBROMIDE SCH MG: 20 TABLET ORAL at 09:06

## 2020-08-05 RX ADMIN — CARVEDILOL SCH MG: 3.12 TABLET, FILM COATED ORAL at 22:37

## 2020-08-05 RX ADMIN — ATORVASTATIN CALCIUM SCH MG: 20 TABLET, FILM COATED ORAL at 22:37

## 2020-08-05 RX ADMIN — CARBIDOPA AND LEVODOPA SCH EACH: 25; 100 TABLET ORAL at 09:06

## 2020-08-05 RX ADMIN — Medication SCH EACH: at 22:37

## 2020-08-05 RX ADMIN — INSULIN ASPART SCH: 100 INJECTION, SOLUTION INTRAVENOUS; SUBCUTANEOUS at 06:09

## 2020-08-05 RX ADMIN — CARBIDOPA AND LEVODOPA SCH EACH: 25; 250 TABLET ORAL at 18:19

## 2020-08-05 RX ADMIN — ENOXAPARIN SODIUM SCH MG: 40 INJECTION SUBCUTANEOUS at 09:06

## 2020-08-05 RX ADMIN — INSULIN ASPART SCH UNITS: 100 INJECTION, SOLUTION INTRAVENOUS; SUBCUTANEOUS at 17:19

## 2020-08-05 RX ADMIN — PRAMIPEXOLE DIHYDROCHLORIDE SCH MG: 0.25 TABLET ORAL at 22:37

## 2020-08-05 RX ADMIN — Medication SCH MG: at 09:04

## 2020-08-05 RX ADMIN — PANTOPRAZOLE SODIUM SCH MG: 40 TABLET, DELAYED RELEASE ORAL at 09:06

## 2020-08-05 RX ADMIN — CARBIDOPA AND LEVODOPA SCH EACH: 25; 100 TABLET ORAL at 13:03

## 2020-08-05 RX ADMIN — FUROSEMIDE SCH MG: 40 TABLET ORAL at 09:06

## 2020-08-05 RX ADMIN — CARBIDOPA AND LEVODOPA SCH EACH: 25; 250 TABLET ORAL at 22:37

## 2020-08-05 RX ADMIN — INSULIN ASPART SCH UNITS: 100 INJECTION, SOLUTION INTRAVENOUS; SUBCUTANEOUS at 11:41

## 2020-08-05 RX ADMIN — POLYETHYLENE GLYCOL 3350 SCH GRAMS: 17 POWDER, FOR SOLUTION ORAL at 09:08

## 2020-08-05 RX ADMIN — CARBIDOPA AND LEVODOPA SCH: 25; 100 TABLET ORAL at 18:19

## 2020-08-05 NOTE — PN
Progress Note, Physician


Chief Complaint: 





Generalized weakness


History of Present Illness: 


Came in with generalized weakness


NAD


OOB with PT





- Current Medication List


Current Medications: 


Active Medications





Acetaminophen (Tylenol -)  650 mg PO Q4H PRN


   PRN Reason: FEVER


Atorvastatin Calcium (Lipitor -)  20 mg PO [facility]


   Last Admin: 08/04/20 21:42 Dose:  20 mg


   Documented by: 


Carbidopa/Levodopa (Sinemet 25/100 -)  1 each PO 0800,1200,1600,2000 [facility]


   Last Admin: 08/05/20 09:06 Dose:  1 each


   Documented by: 


Carvedilol (Coreg -)  3.125 mg PO [facility]


   Last Admin: 08/04/20 21:42 Dose:  3.125 mg


   Documented by: 


Citalopram Hydrobromide (Celexa -)  20 mg PO DAILY [facility]


   Last Admin: 08/05/20 09:06 Dose:  20 mg


   Documented by: 


Enoxaparin Sodium (Lovenox -)  40 mg SQ DAILY [facility]


   Last Admin: 08/05/20 09:06 Dose:  40 mg


   Documented by: 


Furosemide (Lasix -)  40 mg PO DAILY [facility]


   Last Admin: 08/05/20 09:06 Dose:  40 mg


   Documented by: 


Insulin Aspart (Novolog Vial Sliding Scale -)  1 vial SQ TIDAC [facility]; Protocol


   Last Admin: 08/05/20 06:09 Dose:  Not Given


   Documented by: 


Miscellaneous (Lidoderm Patch Removal)  1 each MC DAILY@2200 [facility]


   Last Admin: 08/04/20 21:42 Dose:  1 each


   Documented by: 


Non-Formulary Medication (Pimavanserin Tartrate [Nuplazid])  34 mg PO DAILY [facility]


   Last Admin: 08/05/20 09:04 Dose:  34 mg


   Documented by: 


Pantoprazole Sodium (Protonix -)  40 mg PO DAILY [facility]


   Last Admin: 08/05/20 09:06 Dose:  40 mg


   Documented by: 


Pneumococcal 13-Valent Conj Vacc (Prevnar 13 Syringe -)  0.5 ml IM .ONCE ONE


   Stop: 08/04/20 19:15


Polyethylene Glycol (Miralax (For Daily Use) -)  17 gm PO DAILY [facility]


   Last Admin: 08/05/20 09:08 Dose:  17 grams


   Documented by: 


Pramipexole Dihydrochloride (Mirapex -)  0.75 mg PO [facility]


   Last Admin: 08/04/20 22:31 Dose:  0.75 mg


   Documented by: 


Quetiapine Fumarate (Seroquel -)  25 mg PO HS [facility]


   Last Admin: 08/04/20 21:42 Dose:  25 mg


   Documented by: 


Senna (Senna -)  2 tab PO HS PRN


   PRN Reason: CONSTIPATION











- Objective


Vital Signs: 


                                   Vital Signs











Temperature  98.6 F   08/05/20 09:11


 


Pulse Rate  67   08/05/20 09:11


 


Respiratory Rate  18   08/05/20 09:11


 


Blood Pressure  136/65   08/05/20 09:11


 


O2 Sat by Pulse Oximetry (%)  94 L  08/05/20 09:11











Constitutional: Yes: Well Nourished, No Distress, Calm


Cardiovascular: Yes: Regular Rate and Rhythm


Respiratory: Yes: Regular, CTA Bilaterally


Gastrointestinal: Yes: Normal Bowel Sounds, Soft


Genitourinary: Yes: WNL


Musculoskeletal: Yes: Muscle Weakness


Extremities: Yes: WNL


Edema: No


Peripheral Pulses WNL: Yes


Neurological: Yes: Alert, Oriented


Psychiatric: Yes: Alert, Oriented


Labs: 


                                    CBC, BMP





                                 08/04/20 11:10 





                                 08/05/20 06:30 





                                    INR, PTT











INR  1.39  (0.83-1.09)  H  08/04/20  11:10    














Problem List





- Problems


(1) Spinal stenosis


Assessment/Plan: 


-Neurology consult


-Continue home meds


Problems reviewed: Yes   


Code(s): M48.00 - SPINAL STENOSIS, SITE UNSPECIFIED   





(2) Paresthesia


Assessment/Plan: 


-Continue home dose pramipexole- was hallucinating on higher dosages


-Trial of gabapentin if neurology agrees


Problems reviewed: Yes   


Code(s): R20.2 - PARESTHESIA OF SKIN   





(3) Leg weakness, bilateral


Assessment/Plan: 


-PT


-Physiatry consult


-SNF placement


Problems reviewed: Yes   


Code(s): R29.898 - OTH SYMPTOMS AND SIGNS INVOLVING THE MUSCULOSKELETAL SYSTEM  

 





(4) Parkinson disease


Assessment/Plan: 


-Continue Sinemet


-May continue home med Nuplazid


Problems reviewed: Yes   


Code(s): G20 - PARKINSON'S DISEASE   





(5) Diabetes


Assessment/Plan: 


-A1c 9.2


-Start Metformin 850 mg po bid


-BGM AC HS


-ISS


-Diabetic low sodium diet


-Endocrine consult


Problems reviewed: Yes   


Code(s): E11.9 - TYPE 2 DIABETES MELLITUS WITHOUT COMPLICATIONS   


Qualifiers: 


   Diabetes mellitus type: type 2 





Assessment/Plan





See problem list

## 2020-08-05 NOTE — CONSULT
Consult - text type





- Consultation


Consultation Note: 








NEUROLOGY CONSULTATION is greatly appreciated:





This 75 yo RH m man with h/o HTN, DM, BPH, Gout is well-known to me with 

Parkinson's disease, PD Psychosis and Severe RLS.


Also has diabetic peripheral neuropathy and B/L CTS.


Maintained on: Mirabegron; Atorvastatin; Carbidopa-Levodopa 25/250 QID @ 6, 10, 

2 and 6; Pramipexole 0.5 mg PO HS; Dexilant; Carvedilol;  


Citalopram; Furosemide; Linaclotide; Pimavanserin 34 mg PO DAILY; Seroquel 25 mg

PO HS


Recent psychiatric admission for psychosis/ depression/ suicidal ideation.


Burning leg pains were controlled on pramipexole for many years but the 

development of hallucinations necessitated the taper and D/C of Pramipexole and 

treatment with Nuplazid and quetipine.


Now admitted with increased burning in the legs and decreased gait.





NAILA: No head trauma. Chronic burn right thigh, groin


NEURO: Awake, alert. O x [facility], August 2020


           Hypophonic speech


           Masked facies. Gag OK


           Bradykinetic. Rest tremor both hands. ++Cogwheel rigidity. Absent 

AJ's. Toes downgoing


           Coord: No FTN Dystaxia


           Reduced vibration both feet.


           Spontaneous retropulsion





IMP: Non-focal exam sig for moderately advance Parkinsons disease


       PD Psychosis


       PD-related Restless Limbs syndrome


       Diabetic PN


       B/L CTS





SUGGEST: Resume correct L-Dopa dose of Sinemet 25/250 QID @ 6, 10, 2 and 6.


              Increase pramipexole to 0.25 mg BID at 6 and 2 and 0.5 mg HS


              Continue Nuplazid (Pimavanserin) 34 mg PO q AM- Family must bring 

meds from home


              Increase Quetiapine  to 50 mg QHS


              Mobilize OO Bed to chair TID for all meals and PT for gait with 

walker.





Thank you very much,


Pavel Borjas MD

## 2020-08-05 NOTE — CONSULT
Consult


Consult Specialty:: Physiatry Dr Jordan for Dr Marroquin





- History of Present Illness


Chief Complaint: stiffness, LBP


History of Present Illness: 





This is a 74 year old man with a medical history of Parkinson's disease, 

dementia, CAD ,CHF, s/p PPM, HTN, HLD, BPH, LS stenosis, who presented to the ED

8/04/2020 with 3 months worsening weakness leading to falls as well as visual/ 

auditory hallucinations. He notes B LBP which radiates down the entire BLE and 

is worse with lying in bed, as well as total numbness numbness/ stiffness, 

constipation and urinary hesitancy/ retention. Neurology consult is pending. He 

has not been seen by PT. Physiatry is being consulted for further 

recommendations.





- Past Medical History


Cardio/Vascular: Yes: AFIB, CAD, CHF, HTN, Hyperlipdemia, Other (ppm)





- Past Surgical History


Past Surgical History: Yes: Permanent Pacemaker





- Alcohol/Substance Use


Hx Alcohol Use: Yes





- Smoking History


Smoking history: Never smoked


Have you smoked in the past 12 months: No





- Social History


Usual Living Arrangement: With Spouse (in house without steps to enter, 1st 

floor set-up inside)


ADL: Independent (ambulates with walker but also has wheelchair)


History of Recent Travel: No





Home Medications





- Allergies


Allergies/Adverse Reactions: 


                                    Allergies











Allergy/AdvReac Type Severity Reaction Status Date / Time


 


No Known Drug Allergies Allergy   Verified 08/04/20 10:16














- Home Medications


Home Medications: 


Ambulatory Orders





Mirabegron [Myrbetriq] 50 mg PO DAILY 12/30/16 


Atorvastatin Ca [Lipitor] 20 mg PO DAILY 05/04/17 


Carbidopa/Levodopa [Carbidopa-Levodopa  Tab] 1 each PO Q6H 05/04/17 


Potassium Chloride [Klor-Con 10] 10 meq PO DAILY 05/04/17 


Pramipexole Di-HCl [Pramipexole Dihydrochloride] 0.5 mg PO HS 05/04/17 


Acetaminophen [Tylenol .Regular Strength -] 650 mg PO Q4H PRN #0 tablet 05/09/17




Dexlansoprazole [Dexilant] 60 mg PO DAILY 09/12/19 


Carvedilol [Coreg -] 3.125 mg PO HS 08/04/20 


Citalopram Hydrobromide [Citalopram HBr] 20 mg PO DAILY 08/04/20 


Furosemide [Lasix -] 40 mg PO DAILY 08/04/20 


Insulin (Novolog) [Novolog] 0 units SQ ASDIR 08/04/20 


Insulin Glargine,Hum.rec.anlog [Basaglar Kwikpen U-100] 0 unit SQ DAILY 08/04/20




Linaclotide [Linzess] 290 mcg PO DAILY 08/04/20 


Mirabegron [Myrbetriq] 0 mg PO DAILY 08/04/20 


Pimavanserin Tartrate [Nuplazid] 34 mg PO DAILY 08/04/20 


Quetiapine Fumarate [Seroquel -] 25 mg PO HS 08/04/20 











Review of Systems


Findings/Remarks: 





Denies fevers, chills, changes in vision/ hearing/ mood, CP, SOB, abdominal 

pain, nausea, vomiting, diarrhea, dysuria.


Notes constipation, urinary retention/ hesitancy, total body numbness (including

face) without focal paresthesias, and LBP down entire BLE which is worse with 

lying down





Physical Exam


Vital Signs: 


                                   Vital Signs











Temperature  98.6 F   08/05/20 09:11


 


Pulse Rate  67   08/05/20 09:11


 


Respiratory Rate  18   08/05/20 09:11


 


Blood Pressure  136/65   08/05/20 09:11


 


O2 Sat by Pulse Oximetry (%)  94 L  08/05/20 09:11











Musculoskeletal: Yes: Other (General: calm elderly  M sitting EOB NAD, 

no tenderness to palpation B lumbar paraspinals/ SI/ gluteals, pain improved 

with both flexion and extension (which are both limited by stiffness); R 

shoulder flexion to 60 degrees, L shoulder flexion to 75 degrees, grossly 4+/5 

BUE with diffusely increased tone and slow movements, 2/5 B HF then 4+/5 BLE 

with diffusely increased tone and slow movements; Pinprick Intact BUE/ BLE 

compared to face; no BLE pitting edema, no B calf tenderness)


Labs: 


                                    CBC, BMP





                                 08/04/20 11:10 





                                 08/05/20 06:30 











Imaging





- Results


Cat Scan: Report Reviewed (Ct head 8/04/2020 shows moderate periventricular and 

subcortical chronic microvascular ischemic changes without acute pathology; CT 

cervical spine 8/04/2020 shows multilevel DDD/ DJD with moderate to marked C4-5 

central stenosis, without acute pathology)





Assessment/Plan





Impression:


1) Deficits mobility/ ADLs


2) Deconditioning


3) Gait abnormality


4) Parkinson's disease


5) Chronic LS stenosis with radiculopathy, at baseline per patient


6) Total body numbness


7) hx dementia


8) CAD, CHF, s/p PPM, HTN, HLD


9) BPH


10) Cervical DDD/ DJD with moderate to severe C4-5 central stenosis


11) Overweight


12) Up to date flu shot/ pneumovax





Recommendations:


1) PT for stretching strengthening ROM and functional mobility


2) Falls, safety precautions


3) Cardiac precautions


4) Heat to low back prn


5) Skin protection: float heels, frequent turning


6) Encourage prn bowel regimen: consider adding Colace 100mg up to 3 pills daily


7) DVT ppx: on Lovenox


8) Nutrition consult for overweight


9) Pending Neurology consult: consider adjusting Parkinson's medications


10) EMG (radiculopathy vs neuropathy) if weakness persists despite therapy and 

medication adjustment- outpt #[phone]


11) Discharge planning: given stiffness and weakness, would recommend inpatient 

rehabilitation once medically stable





Thank you for this referral.

## 2020-08-06 LAB
APPEARANCE UR: CLEAR
BACTERIA # UR AUTO: 84 /UL (ref 0–1359)
BILIRUB UR STRIP.AUTO-MCNC: NEGATIVE MG/DL
CASTS URNS QL MICRO: 0 /UL (ref 0–3.1)
COLOR UR: YELLOW
EPITH CASTS URNS QL MICRO: 5 /UL (ref 0–25.1)
KETONES UR QL STRIP: NEGATIVE
LEUKOCYTE ESTERASE UR QL STRIP.AUTO: NEGATIVE
NITRITE UR QL STRIP: NEGATIVE
PH UR: 6 [PH] (ref 5–8)
PROT UR QL STRIP: (no result)
PROT UR QL STRIP: (no result)
RBC # BLD AUTO: 6 /UL (ref 0–23.9)
SP GR UR: 1.01 (ref 1.01–1.03)
UROBILINOGEN UR STRIP-MCNC: 1 MG/DL (ref 0.2–1)
WBC # UR AUTO: 2 /UL (ref 0–25.8)

## 2020-08-06 RX ADMIN — CEFUROXIME AXETIL SCH MG: 500 TABLET ORAL at 22:05

## 2020-08-06 RX ADMIN — PRAMIPEXOLE DIHYDROCHLORIDE SCH MG: 0.25 TABLET ORAL at 22:06

## 2020-08-06 RX ADMIN — Medication SCH EACH: at 22:06

## 2020-08-06 RX ADMIN — PRAMIPEXOLE DIHYDROCHLORIDE SCH MG: 0.25 TABLET ORAL at 06:14

## 2020-08-06 RX ADMIN — ATORVASTATIN CALCIUM SCH MG: 20 TABLET, FILM COATED ORAL at 22:05

## 2020-08-06 RX ADMIN — PANTOPRAZOLE SODIUM SCH MG: 40 TABLET, DELAYED RELEASE ORAL at 13:10

## 2020-08-06 RX ADMIN — FUROSEMIDE SCH MG: 40 TABLET ORAL at 12:06

## 2020-08-06 RX ADMIN — INSULIN ASPART SCH: 100 INJECTION, SOLUTION INTRAVENOUS; SUBCUTANEOUS at 17:36

## 2020-08-06 RX ADMIN — CITALOPRAM HYDROBROMIDE SCH MG: 20 TABLET ORAL at 13:10

## 2020-08-06 RX ADMIN — POLYETHYLENE GLYCOL 3350 SCH GRAMS: 17 POWDER, FOR SOLUTION ORAL at 12:09

## 2020-08-06 RX ADMIN — ENOXAPARIN SODIUM SCH MG: 40 INJECTION SUBCUTANEOUS at 12:12

## 2020-08-06 RX ADMIN — INSULIN ASPART SCH: 100 INJECTION, SOLUTION INTRAVENOUS; SUBCUTANEOUS at 06:11

## 2020-08-06 RX ADMIN — CARBIDOPA AND LEVODOPA SCH EACH: 25; 250 TABLET ORAL at 18:23

## 2020-08-06 RX ADMIN — Medication SCH MG: at 12:08

## 2020-08-06 RX ADMIN — CARBIDOPA AND LEVODOPA SCH EACH: 25; 250 TABLET ORAL at 15:29

## 2020-08-06 RX ADMIN — INSULIN ASPART SCH UNITS: 100 INJECTION, SOLUTION INTRAVENOUS; SUBCUTANEOUS at 12:09

## 2020-08-06 RX ADMIN — CARVEDILOL SCH MG: 3.12 TABLET, FILM COATED ORAL at 22:05

## 2020-08-06 RX ADMIN — CARBIDOPA AND LEVODOPA SCH EACH: 25; 250 TABLET ORAL at 12:11

## 2020-08-06 RX ADMIN — PRAMIPEXOLE DIHYDROCHLORIDE SCH MG: 0.25 TABLET ORAL at 15:29

## 2020-08-06 RX ADMIN — CARBIDOPA AND LEVODOPA SCH EACH: 25; 250 TABLET ORAL at 06:15

## 2020-08-06 NOTE — DS
Physical Examination


Vital Signs: 


                                   Vital Signs











Temperature  98.7 F   08/06/20 18:00


 


Pulse Rate  74   08/06/20 18:00


 


Respiratory Rate  18   08/06/20 18:00


 


Blood Pressure  129/68   08/06/20 18:00


 


O2 Sat by Pulse Oximetry (%)  98   08/06/20 18:00











Findings/Remarks: 





(1) Spinal stenosis


Assessment/Plan: 


-Neurology consult


-Continue home meds


Problems reviewed: Yes   


Code(s): M48.00 - SPINAL STENOSIS, SITE UNSPECIFIED   





(2) Paresthesia


Assessment/Plan: 


-Pramipexole 0.25 mg in AM and 1 mg at bedtime


-Seen by Neurology





Problems reviewed: Yes   


Code(s): R20.2 - PARESTHESIA OF SKIN   





(3) Leg weakness, bilateral


Assessment/Plan: 


-PT


-Physiatry consult


-SNF placement


Problems reviewed: Yes   


Code(s): R29.898 - OTH SYMPTOMS AND SIGNS INVOLVING THE MUSCULOSKELETAL SYSTEM  







(4) Parkinson disease


Assessment/Plan: 


-Continue Sinemet


-May continue home med Nuplazid


Problems reviewed: Yes   


Code(s): G20 - PARKINSON'S DISEASE   





(5) Diabetes


Assessment/Plan: 


-A1c 9.2


-Start Metformin 850 mg po bid


-Add Januvia 50 mg po daily


-BGM AC HS


-ISS


-Diabetic low sodium diet


-Endocrine consult


Problems reviewed: Yes   


Code(s): E11.9 - TYPE 2 DIABETES MELLITUS WITHOUT COMPLICATIONS   


Qualifiers: 


   Diabetes mellitus type: type 2 





Assessment/Plan





See problem list


Spoke to son Lorenzo, in agreement for pt to go to SNF to [facility].





Constitutional: Yes: Well Nourished, No Distress, Calm


Cardiovascular: Yes: Regular Rate and Rhythm


Respiratory: Yes: Regular, CTA Bilaterally


Gastrointestinal: Yes: Normal Bowel Sounds, Soft, Abdomen, Obese


Renal/: Yes: WNL


Musculoskeletal: Yes: Muscle Weakness


Extremities: Yes: WNL


Edema: No


Peripheral Pulses WNL: Yes


Neurological: Yes: Alert, Oriented


Psychiatric: Yes: Alert, Oriented


Labs: 


                                    CBC, BMP





                                 08/04/20 11:10 





                                 08/05/20 06:30 











Discharge Summary


Problems reviewed: Yes


Reason For Visit: UNSTEADY GAIT AMS PARKINSONS DISEASE FALL


Current Active Problems





Fall (Acute)


Leg weakness, bilateral (Acute)


Paresthesia (Acute)


Parkinson disease (Acute)


Spinal stenosis (Acute)








Condition: Stable





- Instructions


Referrals: 


Donnie Jones MD [Primary Care Provider] - 


Disposition: SKILLED NURSING FACILITY





- Home Medications


Comprehensive Discharge Medication List: 


Ambulatory Orders





Mirabegron [Myrbetriq] 50 mg PO DAILY 12/30/16 


Atorvastatin Ca [Lipitor] 20 mg PO DAILY 05/04/17 


Pramipexole Di-HCl [Pramipexole Dihydrochloride] 0.5 mg PO HS 05/04/17 


Acetaminophen [Tylenol .Regular Strength -] 650 mg PO Q4H PRN #0 tablet 05/09/17




Carvedilol [Coreg -] 3.125 mg PO HS 08/04/20 


Citalopram Hydrobromide [Citalopram HBr] 20 mg PO DAILY 08/04/20 


Furosemide [Lasix -] 40 mg PO DAILY 08/04/20 


Pimavanserin Tartrate [Nuplazid] 34 mg PO DAILY 08/04/20 


Carbidopa/Levodopa 25/250 [Sinemet 25/250 -] 1 each PO 0600,1000,1400,1800  

tablet 08/06/20 


Cefuroxime Axetil [Ceftin -] 500 mg PO BID #14 tablet 08/06/20 


Enoxaparin [Lovenox -] 40 mg SQ DAILY  disp.syrin 08/06/20 


Insulin Sliding Scale [Novolog Vial Sliding Scale -] 1 vial SQ TIDAC  units 

08/06/20 


Lidocaine Patch Removal [Lidoderm Patch Removal] 1 each MC DAILY@2200  each 

08/06/20 


Pantoprazole Sodium [Protonix -] 40 mg PO DAILY  tablet.ec 08/06/20 


Polyethylene Glycol 3350 [Miralax 119 gm Btl -] 17 gm PO DAILY  bottle 08/06/20 


Pramipexole Dihydrochloride [Mirapex -] 0.25 mg PO BID@0600,1400  tablet 

08/06/20 


Quetiapine Fumarate [Seroquel -] 50 mg PO HS  tablet 08/06/20 


Sennosides [Senna -] 2 tab PO HS PRN  tablet 08/06/20 


Sitagliptin Phosphate [Januvia -] 50 mg PO DAILY@0700 #30 tablet 08/06/20 


metFORMIN HCL [Glucophage -] 850 mg PO BID@0700,1630  tablet 08/06/20 








Prescription Drug Monitoring Program (I-STOP) results: I-STOP reviewed and no 

issues identified

## 2020-08-06 NOTE — PN
Progress Note, Physician


Chief Complaint: 





Generalized weakness


History of Present Illness: 


Came in with generalized weakness


NAD


OOB with PT





- Current Medication List


Current Medications: 


Active Medications





Acetaminophen (Tylenol -)  650 mg PO Q4H PRN


   PRN Reason: FEVER


Atorvastatin Calcium (Lipitor -)  20 mg PO [facility]


   Last Admin: 08/05/20 22:37 Dose:  20 mg


   Documented by: 


Carbidopa/Levodopa (Sinemet 25/250 -)  1 each PO 0600,1000,1400,1800 [facility]


   Last Admin: 08/06/20 06:15 Dose:  1 each


   Documented by: 


Carvedilol (Coreg -)  3.125 mg PO [facility]


   Last Admin: 08/05/20 22:37 Dose:  3.125 mg


   Documented by: 


Citalopram Hydrobromide (Celexa -)  20 mg PO DAILY [facility]


   Last Admin: 08/05/20 09:06 Dose:  20 mg


   Documented by: 


Enoxaparin Sodium (Lovenox -)  40 mg SQ DAILY [facility]


   Last Admin: 08/05/20 09:06 Dose:  40 mg


   Documented by: 


Furosemide (Lasix -)  40 mg PO DAILY [facility]


   Last Admin: 08/05/20 09:06 Dose:  40 mg


   Documented by: 


Insulin Aspart (Novolog Vial Sliding Scale -)  1 vial SQ TIDAC [facility]; Protocol


   Last Admin: 08/06/20 06:11 Dose:  Not Given


   Documented by: 


Metformin HCl (Glucophage -)  850 mg PO BID@0700,1630 [facility]


   Last Admin: 08/06/20 06:16 Dose:  850 mg


   Documented by: 


Miscellaneous (Lidoderm Patch Removal)  1 each MC DAILY@2200 [facility]


   Last Admin: 08/05/20 22:37 Dose:  1 each


   Documented by: 


Non-Formulary Medication (Pimavanserin Tartrate [Nuplazid])  34 mg PO DAILY [facility]


   Last Admin: 08/05/20 09:04 Dose:  34 mg


   Documented by: 


Pantoprazole Sodium (Protonix -)  40 mg PO DAILY [facility]


   Last Admin: 08/05/20 09:06 Dose:  40 mg


   Documented by: 


Polyethylene Glycol (Miralax (For Daily Use) -)  17 gm PO DAILY [facility]


   Last Admin: 08/05/20 09:08 Dose:  17 grams


   Documented by: 


Pramipexole Dihydrochloride (Mirapex -)  0.75 mg PO [facility]


   Last Admin: 08/05/20 22:37 Dose:  0.75 mg


   Documented by: 


Pramipexole Dihydrochloride (Mirapex -)  0.25 mg PO BID@0600,1400 [facility]


   Last Admin: 08/06/20 06:14 Dose:  0.25 mg


   Documented by: 


Quetiapine Fumarate (Seroquel -)  50 mg PO HS [facility]


   Last Admin: 08/05/20 22:37 Dose:  50 mg


   Documented by: 


Senna (Senna -)  2 tab PO HS PRN


   PRN Reason: CONSTIPATION











- Objective


Vital Signs: 


                                   Vital Signs











Temperature  98.1 F   08/06/20 06:00


 


Pulse Rate  58 L  08/06/20 06:00


 


Respiratory Rate  20   08/06/20 06:00


 


Blood Pressure  127/57 L  08/06/20 06:00


 


O2 Sat by Pulse Oximetry (%)  97   08/06/20 00:00











Constitutional: Yes: Well Nourished, No Distress, Calm


Cardiovascular: Yes: Regular Rate and Rhythm


Respiratory: Yes: Regular, CTA Bilaterally


Gastrointestinal: Yes: Normal Bowel Sounds, Soft, Abdomen, Obese


Genitourinary: Yes: WNL


Musculoskeletal: Yes: Muscle Weakness


Extremities: Yes: WNL


Edema: No


Peripheral Pulses WNL: Yes


Neurological: Yes: Alert, Oriented


Psychiatric: Yes: Alert, Oriented


Labs: 


                                    CBC, BMP





                                 08/04/20 11:10 





                                 08/05/20 06:30 





                                    INR, PTT











INR  1.39  (0.83-1.09)  H  08/04/20  11:10    














Problem List





- Problems


(1) Spinal stenosis


Assessment/Plan: 


-Neurology consult


-Continue home meds


Problems reviewed: Yes   


Code(s): M48.00 - SPINAL STENOSIS, SITE UNSPECIFIED   





(2) Paresthesia


Assessment/Plan: 


-Pramipexole 0.25 mg in AM and 1 mg at bedtime


-Seen by Neurology


-Trial of gabapentin if neurology agrees


Problems reviewed: Yes   


Code(s): R20.2 - PARESTHESIA OF SKIN   





(3) Leg weakness, bilateral


Assessment/Plan: 


-PT


-Physiatry consult


-SNF placement


Problems reviewed: Yes   


Code(s): R29.898 - OTH SYMPTOMS AND SIGNS INVOLVING THE MUSCULOSKELETAL SYSTEM  

 





(4) Parkinson disease


Assessment/Plan: 


-Continue Sinemet


-May continue home med Nuplazid


Problems reviewed: Yes   


Code(s): G20 - PARKINSON'S DISEASE   





(5) Diabetes


Assessment/Plan: 


-A1c 9.2


-Start Metformin 850 mg po bid


-Add Januvia 50 mg po daily


-BGM AC HS


-ISS


-Diabetic low sodium diet


-Endocrine consult


Problems reviewed: Yes   


Code(s): E11.9 - TYPE 2 DIABETES MELLITUS WITHOUT COMPLICATIONS   


Qualifiers: 


   Diabetes mellitus type: type 2 





Assessment/Plan





See problem list


Spoke to nuvia Ventura, in agreement for pt to go to SNF to [facility].

## 2020-08-06 NOTE — CONSULT
Consult


Consult Specialty:: Endocrine


Referred by:: sendy rosen


Reason for Consultation:: diabetes mellitus type 2





- History of Present Illness


Chief Complaint: weak and dizzy high sugars


History of Present Illness: 


75Y/O M HX of DMT2,htn,hld,parkinson's dementia. BPH, CHF,  CAD, pacemaker, 

lumbosacral spinal stenosis, presented for leg weakness difficulty walking and 

frequent falls,has lost ballance and control of blood sugars,frequent 

urination,headache and numbness in legs easily short of breath,no cough fever 

nausea or vomiting.





- Past Medical History


Cardio/Vascular: Yes: AFIB, CAD, CHF, HTN, Hyperlipdemia, Other (ppm)





- Past Surgical History


Past Surgical History: Yes: Permanent Pacemaker





- Alcohol/Substance Use


Hx Alcohol Use: Yes





- Smoking History


Smoking history: Never smoked


Have you smoked in the past 12 months: No





- Social History


Usual Living Arrangement: With Spouse (in house without steps to enter, 1st 

floor set-up inside)


ADL: Independent (ambulates with walker but also has wheelchair)


History of Recent Travel: No





Home Medications





- Allergies


Allergies/Adverse Reactions: 


                                    Allergies











Allergy/AdvReac Type Severity Reaction Status Date / Time


 


No Known Drug Allergies Allergy   Verified 08/04/20 10:16














- Home Medications


Home Medications: 


Ambulatory Orders





Mirabegron [Myrbetriq] 50 mg PO DAILY 12/30/16 


Atorvastatin Ca [Lipitor] 20 mg PO DAILY 05/04/17 


Pramipexole Di-HCl [Pramipexole Dihydrochloride] 0.5 mg PO HS 05/04/17 


Acetaminophen [Tylenol .Regular Strength -] 650 mg PO Q4H PRN #0 tablet 05/09/17




Carvedilol [Coreg -] 3.125 mg PO HS 08/04/20 


Citalopram Hydrobromide [Citalopram HBr] 20 mg PO DAILY 08/04/20 


Furosemide [Lasix -] 40 mg PO DAILY 08/04/20 


Pimavanserin Tartrate [Nuplazid] 34 mg PO DAILY 08/04/20 


Carbidopa/Levodopa 25/250 [Sinemet 25/250 -] 1 each PO 0600,1000,1400,1800  

tablet 08/06/20 


Cefuroxime Axetil [Ceftin -] 500 mg PO BID #14 tablet 08/06/20 


Enoxaparin [Lovenox -] 40 mg SQ DAILY  disp.syrin 08/06/20 


Insulin Sliding Scale [Novolog Vial Sliding Scale -] 1 vial SQ TIDAC  units 

08/06/20 


Lidocaine Patch Removal [Lidoderm Patch Removal] 1 each MC DAILY@2200  each 

08/06/20 


Pantoprazole Sodium [Protonix -] 40 mg PO DAILY  tablet.ec 08/06/20 


Polyethylene Glycol 3350 [Miralax 119 gm Btl -] 17 gm PO DAILY  bottle 08/06/20 


Pramipexole Dihydrochloride [Mirapex -] 0.25 mg PO BID@0600,1400  tablet 

08/06/20 


Quetiapine Fumarate [Seroquel -] 50 mg PO HS  tablet 08/06/20 


Sennosides [Senna -] 2 tab PO HS PRN  tablet 08/06/20 


Sitagliptin Phosphate [Januvia -] 50 mg PO DAILY@0700 #30 tablet 08/06/20 


metFORMIN HCL [Glucophage -] 850 mg PO BID@0700,1630  tablet 08/06/20 











Review of Systems





- Review of Systems


Constitutional: reports: Lethargy, Malaise


Eyes: reports: No Symptoms


HENT: reports: No Symptoms


Neck: reports: No Symptoms


Cardiovascular: reports: Shortness of Breath


Respiratory: reports: Exercise Intolerance, SOB, SOB on Exertion


Gastrointestinal: reports: Bloating, Indigestion


Genitourinary: reports: Frequency


Breasts: reports: No Symptoms Reported


Musculoskeletal: reports: Muscle Cramps, Muscle Weakness





Physical Exam


Vital Signs: 


                                   Vital Signs











Temperature  98.7 F   08/06/20 18:00


 


Pulse Rate  74   08/06/20 18:00


 


Respiratory Rate  18   08/06/20 18:00


 


Blood Pressure  129/68   08/06/20 18:00


 


O2 Sat by Pulse Oximetry (%)  98   08/06/20 18:00











Constitutional: Yes: Calm


Eyes: Yes: Conjunctiva Clear


HENT: Yes: Normocephalic


Neck: Yes: Supple


Cardiovascular: Yes: Regular Rate and Rhythm


Respiratory: Yes: CTA Bilaterally


Labs: 


                                    CBC, BMP





                                 08/04/20 11:10 





                                 08/05/20 06:30 











Problem List





- Problems


(1) Fall


Problems reviewed: Yes   


Code(s): W19.XXXA - UNSPECIFIED FALL, INITIAL ENCOUNTER   


Qualifiers: 


   Qualified Code(s): W19.XXXA - Unspecified fall, initial encounter   





(2) Leg weakness, bilateral


Code(s): R29.898 - OTH SYMPTOMS AND SIGNS INVOLVING THE MUSCULOSKELETAL SYSTEM  







(3) Paresthesia


Code(s): R20.2 - PARESTHESIA OF SKIN   





(4) Parkinson disease


Code(s): G20 - PARKINSON'S DISEASE   





(5) Spinal stenosis


Code(s): M48.00 - SPINAL STENOSIS, SITE UNSPECIFIED   





(6) Acute on chronic systolic (congestive) heart failure


Code(s): I50.23 - ACUTE ON CHRONIC SYSTOLIC (CONGESTIVE) HEART FAILURE   





(7) Bifascicular block


Code(s): I45.2 - BIFASCICULAR BLOCK   





(8) CAD (coronary artery disease)


Code(s): I25.10 - ATHSCL HEART DISEASE OF NATIVE CORONARY ARTERY W/O ANG PCTRS  







Assessment/Plan





Current Active Problems


Diabetes mellitus type 2,neuropathy 


hyperglycemia


Fall (Acute)


Leg weakness, bilateral (Acute)


Paresthesia (Acute)


Parkinson disease (Acute)


Spinal stenosis (Acute)


                              Abnormal Lab Results











  08/06/20





  07:50


 


Urine Protein  1+ H


 


Urine Glucose (UA)  1+ H








                         Laboratory Results - last 24 hr











  08/05/20 08/06/20 08/06/20





  23:47 06:10 07:50


 


POC Glucometer  217  144 


 


Urine Color    Yellow


 


Urine Appearance    Clear


 


Urine pH    6.0


 


Ur Specific Gravity    1.015


 


Urine Protein    1+ H


 


Urine Glucose (UA)    1+ H


 


Urine Ketones    Negative


 


Urine Blood    Negative


 


Urine Nitrite    Negative


 


Urine Bilirubin    Negative


 


Urine Urobilinogen    1.0


 


Ur Leukocyte Esterase    Negative


 


Urine WBC (Auto)    2


 


Urine RBC (Auto)    6


 


Urine Casts (Auto)    0


 


U Epithel Cells (Auto)    5


 


Urine Bacteria (Auto)    84














  08/06/20 08/06/20





  11:54 16:55


 


POC Glucometer  240  221


 


Urine Color  


 


Urine Appearance  


 


Urine pH  


 


Ur Specific Gravity  


 


Urine Protein  


 


Urine Glucose (UA)  


 


Urine Ketones  


 


Urine Blood  


 


Urine Nitrite  


 


Urine Bilirubin  


 


Urine Urobilinogen  


 


Ur Leukocyte Esterase  


 


Urine WBC (Auto)  


 


Urine RBC (Auto)  


 


Urine Casts (Auto)  


 


U Epithel Cells (Auto)  


 


Urine Bacteria (Auto)  














                                Laboratory Tests











  08/04/20 08/04/20 08/04/20





  11:10 11:40 18:40


 


Sodium  139  


 


Potassium  5.2 H  


 


Chloride  102  


 


Carbon Dioxide  31  


 


Anion Gap  6 L  


 


BUN  23.0 H  


 


Creatinine  1.1  


 


POC Glucometer   215 


 


Hemoglobin A1c %    9.2 H














  08/04/20





  21:41


 


Sodium 


 


Potassium 


 


Chloride 


 


Carbon Dioxide 


 


Anion Gap 


 


BUN 


 


Creatinine 


 


POC Glucometer  253


 


Hemoglobin A1c % 








plan:


bgm achs,novolog scale


levemir 10 units ac breakfeast


diet and nutrition 


tsh free t4


b12 level

## 2020-08-06 NOTE — CONS
INFECTIOUS DISEASE CONSULTATION

 

DATE OF CONSULTATION:

 

DATE OF DICTATION:  08/06/2020

 

The patient is a 74-year-old male with a history of parkinsonism, BPH, and possible

neurogenic bladder evaluated for positive urine culture.  He was admitted to the

hospital on August 4, 2020, with increasing bilateral lower extremity weakness. 

According to the notes, he has been experiencing worsening lower extremity weakness

over the past 2-3 months.  He was evaluated at [facility] where a CAT scan of the

head was performed and was negative.  He was seen in consultation by Neurology whose

opinion was moderately advanced parkinsonism, Parkinson-related restless limb

syndrome.

 

The patient has a history of BPH.  He reports seeing a urologist in the past.  He

does have difficulty voiding at times.  At the present time, however, he reports he

is able to void.  He denies any dysuria.  He also described difficulty voiding,

possibly secondary to neurogenic bladder related to his parkinsonism.

 

PAST MEDICAL HISTORY:  Positive for parkinsonism, BPH, hypertension, hyperlipidemia,

congestive heart failure, coronary artery disease, diabetes mellitus, spinal

stenosis.

 

PAST SURGICAL HISTORY:  Status post permanent pacemaker.

 

ALLERGIES:  No known allergies.

 

MEDICATIONS:  Ceftriaxone, Lovenox, Lipitor, Coreg, Sinemet, Lasix, Protonix.

 

SOCIAL HISTORY:  He is a nonsmoker, occasional EtOH.  Resides in the community with

family.

 

SYSTEMS REVIEW:

Neurologic:  As per HPI.

Cardiac:  Negative chest pain or palpitations.

Respiratory:  Negative cough or sputum production.

Gastrointestinal:  Negative vomiting or diarrhea.

Genitourinary:  As per HPI.

 

LABORATORY DATA:  White count 9.1, hematocrit 41.6, platelets 162.  Creatinine 1.0. 

Urine leukocyte esterase negative.  Urine culture:  30,000 to 40,000 colonies of

lactose .  COVID-19 PCR negative.

 

PHYSICAL EXAMINATION:

General:  He is awake, in no acute distress, not acutely toxic appearing.

Vital Signs:  Temperature 97.6; blood pressure 106/66; pulse 64, regular;

respirations 18 per minute.

HEENT:  Sclerae are anicteric.

Heart:  Sounds S1, S2.

Lungs:  Clear.

Abdomen:  Soft.  No suprapubic or flank tenderness.

Extremities:  Negative for edema.

 

IMPRESSION:

1.  Asymptomatic bacteriuria.

2.  Benign prostatic hypertrophy.

3.  Possible neurogenic bladder.

4.  Parkinsonism.

 

Would observe off antibiotic therapy.  I do not recommend treatment for asymptomatic

bacteriuria.  Outpatient urology followup.

 

Thank you for the kind referral.

 

 

NAI RIVERA M.D.

 

[id number]

DD: 08/06/2020 14:01

DT: 08/06/2020 14:40

Job #:  [id number]

## 2020-08-06 NOTE — PN
Progress Note (short form)





- Note


Progress Note: 





ID CONSULT DICTATED


ASYMPTOMATIC BACTERURIA


BPH


?NEUROGENIC BLADDER


PARKINSONISM


OBSERVE OFF ANTIBIOTICS

## 2020-08-07 VITALS — HEART RATE: 67 BPM | TEMPERATURE: 98.8 F | SYSTOLIC BLOOD PRESSURE: 121 MMHG | DIASTOLIC BLOOD PRESSURE: 59 MMHG

## 2020-08-07 RX ADMIN — CITALOPRAM HYDROBROMIDE SCH MG: 20 TABLET ORAL at 10:05

## 2020-08-07 RX ADMIN — CARBIDOPA AND LEVODOPA SCH EACH: 25; 250 TABLET ORAL at 06:40

## 2020-08-07 RX ADMIN — PANTOPRAZOLE SODIUM SCH MG: 40 TABLET, DELAYED RELEASE ORAL at 10:06

## 2020-08-07 RX ADMIN — PRAMIPEXOLE DIHYDROCHLORIDE SCH MG: 0.25 TABLET ORAL at 13:28

## 2020-08-07 RX ADMIN — PRAMIPEXOLE DIHYDROCHLORIDE SCH MG: 0.25 TABLET ORAL at 06:40

## 2020-08-07 RX ADMIN — ENOXAPARIN SODIUM SCH MG: 40 INJECTION SUBCUTANEOUS at 10:07

## 2020-08-07 RX ADMIN — POLYETHYLENE GLYCOL 3350 SCH GRAMS: 17 POWDER, FOR SOLUTION ORAL at 10:09

## 2020-08-07 RX ADMIN — INSULIN ASPART SCH: 100 INJECTION, SOLUTION INTRAVENOUS; SUBCUTANEOUS at 06:46

## 2020-08-07 RX ADMIN — Medication SCH MG: at 10:06

## 2020-08-07 RX ADMIN — CARBIDOPA AND LEVODOPA SCH EACH: 25; 250 TABLET ORAL at 10:07

## 2020-08-07 RX ADMIN — INSULIN ASPART SCH: 100 INJECTION, SOLUTION INTRAVENOUS; SUBCUTANEOUS at 12:07

## 2020-08-07 RX ADMIN — CEFUROXIME AXETIL SCH MG: 500 TABLET ORAL at 10:04

## 2020-08-07 RX ADMIN — FUROSEMIDE SCH MG: 40 TABLET ORAL at 10:05

## 2020-08-07 RX ADMIN — CARBIDOPA AND LEVODOPA SCH EACH: 25; 250 TABLET ORAL at 13:28

## 2020-08-07 NOTE — DS
Physical Examination


Vital Signs: 


                                   Vital Signs











Temperature  98.2 F   08/07/20 08:55


 


Pulse Rate  77   08/07/20 08:55


 


Respiratory Rate  18   08/07/20 08:55


 


Blood Pressure  121/67   08/07/20 08:55


 


O2 Sat by Pulse Oximetry (%)  95   08/07/20 08:55











Cardiovascular: Yes: S1, S2


Respiratory: Yes: Regular, CTA Bilaterally


Gastrointestinal: Yes: Normal Bowel Sounds, Soft


Edema: No


Labs: 


                                    CBC, BMP





                                 08/04/20 11:10 





                                 08/05/20 06:30 











Discharge Summary


Problems reviewed: Yes


Reason For Visit: UNSTEADY GAIT AMS PARKINSONS DISEASE FALL


Current Active Problems





Fall (Acute)


Leg weakness, bilateral (Acute)


Paresthesia (Acute)


Parkinson disease (Acute)


Spinal stenosis (Acute)








Hospital Course: 





(1) Spinal stenosis


Assessment/Plan: 


-Neurology consult


-Continue home meds


Problems reviewed: Yes   


Code(s): M48.00 - SPINAL STENOSIS, SITE UNSPECIFIED   





(2) Paresthesia


Assessment/Plan: 


-Pramipexole 0.25 mg in AM and 1 mg at bedtime


-Seen by Neurology





Problems reviewed: Yes   


Code(s): R20.2 - PARESTHESIA OF SKIN   





(3) Leg weakness, bilateral


Assessment/Plan: 


-PT


-Physiatry consult


-SNF placement


Problems reviewed: Yes   


Code(s): R29.898 - OTH SYMPTOMS AND SIGNS INVOLVING THE MUSCULOSKELETAL SYSTEM  







(4) Parkinson disease


Assessment/Plan: 


-Continue Sinemet


-May continue home med Nuplazid


Problems reviewed: Yes   


Code(s): G20 - PARKINSON'S DISEASE   





(5) Diabetes


Assessment/Plan: 


-A1c 9.2


-Start Metformin 850 mg po bid


-Add Januvia 50 mg po daily


-BGM AC HS


-ISS


-Diabetic low sodium diet


-Endocrine consult


Problems reviewed: Yes   


Code(s): E11.9 - TYPE 2 DIABETES MELLITUS WITHOUT COMPLICATIONS   


Qualifiers: 


   Diabetes mellitus type: type 2 


Condition: Stable





- Instructions


Referrals: 


Donnie Jones MD [Primary Care Provider] - 


Disposition: SKILLED NURSING FACILITY





- Home Medications


Comprehensive Discharge Medication List: 


Ambulatory Orders





Mirabegron [Myrbetriq] 50 mg PO DAILY 12/30/16 


Atorvastatin Ca [Lipitor] 20 mg PO DAILY 05/04/17 


Pramipexole Di-HCl [Pramipexole Dihydrochloride] 0.5 mg PO HS 05/04/17 


Acetaminophen [Tylenol .Regular Strength -] 650 mg PO Q4H PRN #0 tablet 05/09/17




Carvedilol [Coreg -] 3.125 mg PO HS 08/04/20 


Citalopram Hydrobromide [Citalopram HBr] 20 mg PO DAILY 08/04/20 


Furosemide [Lasix -] 40 mg PO DAILY 08/04/20 


Pimavanserin Tartrate [Nuplazid] 34 mg PO DAILY 08/04/20 


Carbidopa/Levodopa 25/250 [Sinemet 25/250 -] 1 each PO 0600,1000,1400,1800  

tablet 08/06/20 


Cefuroxime Axetil [Ceftin -] 500 mg PO BID #14 tablet 08/06/20 


Enoxaparin [Lovenox -] 40 mg SQ DAILY  disp.syrin 08/06/20 


Insulin Sliding Scale [Novolog Vial Sliding Scale -] 1 vial SQ TIDAC  units 

08/06/20 


Lidocaine Patch Removal [Lidoderm Patch Removal] 1 each MC DAILY@2200  each 

08/06/20 


Pantoprazole Sodium [Protonix -] 40 mg PO DAILY  tablet.ec 08/06/20 


Polyethylene Glycol 3350 [Miralax 119 gm Btl -] 17 gm PO DAILY  bottle 08/06/20 


Pramipexole Dihydrochloride [Mirapex -] 0.25 mg PO BID@0600,1400  tablet 

08/06/20 


Quetiapine Fumarate [Seroquel -] 50 mg PO HS  tablet 08/06/20 


Sennosides [Senna -] 2 tab PO HS PRN  tablet 08/06/20 


Sitagliptin Phosphate [Januvia -] 50 mg PO DAILY@0700 #30 tablet 08/06/20 


metFORMIN HCL [Glucophage -] 850 mg PO BID@0700,1630  tablet 08/06/20

## 2021-02-11 ENCOUNTER — HOSPITAL ENCOUNTER (INPATIENT)
Dept: HOSPITAL 74 - JER | Age: 76
LOS: 7 days | Discharge: HOME | DRG: 178 | End: 2021-02-18
Attending: NURSE PRACTITIONER | Admitting: INTERNAL MEDICINE
Payer: COMMERCIAL

## 2021-02-11 VITALS — BODY MASS INDEX: 27.4 KG/M2

## 2021-02-11 DIAGNOSIS — N39.0: ICD-10-CM

## 2021-02-11 DIAGNOSIS — B96.4: ICD-10-CM

## 2021-02-11 DIAGNOSIS — Z91.81: ICD-10-CM

## 2021-02-11 DIAGNOSIS — R42: ICD-10-CM

## 2021-02-11 DIAGNOSIS — R32: ICD-10-CM

## 2021-02-11 DIAGNOSIS — I10: ICD-10-CM

## 2021-02-11 DIAGNOSIS — E78.5: ICD-10-CM

## 2021-02-11 DIAGNOSIS — U07.1: Primary | ICD-10-CM

## 2021-02-11 DIAGNOSIS — E11.65: ICD-10-CM

## 2021-02-11 DIAGNOSIS — Z87.891: ICD-10-CM

## 2021-02-11 DIAGNOSIS — G20: ICD-10-CM

## 2021-02-11 DIAGNOSIS — I25.10: ICD-10-CM

## 2021-02-11 DIAGNOSIS — Z16.12: ICD-10-CM

## 2021-02-11 LAB
ALBUMIN SERPL-MCNC: 3.3 G/DL (ref 3.4–5)
ALP SERPL-CCNC: 103 U/L (ref 45–117)
ALT SERPL-CCNC: 12 U/L (ref 13–61)
ANION GAP SERPL CALC-SCNC: 4 MMOL/L (ref 8–16)
APTT BLD: 30 SECONDS (ref 25.2–36.5)
AST SERPL-CCNC: 22 U/L (ref 15–37)
BASE EXCESS BLDV CALC-SCNC: 5.1 MMOL/L (ref -2–2)
BASOPHILS # BLD: 0.7 % (ref 0–2)
BILIRUB CONJ SERPL-MCNC: 0.2 MG/DL (ref 0–0.2)
BILIRUB DIRECT SERPL-MCNC: 234 U/L (ref 87–246)
BILIRUB SERPL-MCNC: 0.5 MG/DL (ref 0.2–1)
BUN SERPL-MCNC: 26.5 MG/DL (ref 7–18)
CALCIUM SERPL-MCNC: 8.8 MG/DL (ref 8.5–10.1)
CHLORIDE SERPL-SCNC: 101 MMOL/L (ref 98–107)
CO2 SERPL-SCNC: 30 MMOL/L (ref 21–32)
CREAT SERPL-MCNC: 1.2 MG/DL (ref 0.55–1.3)
DEPRECATED RDW RBC AUTO: 15.6 % (ref 11.9–15.9)
EOSINOPHIL # BLD: 1.9 % (ref 0–4.5)
GLUCOSE SERPL-MCNC: 343 MG/DL (ref 74–106)
HCT VFR BLD CALC: 38.5 % (ref 35.4–49)
HGB BLD-MCNC: 12.5 GM/DL (ref 11.7–16.9)
INR BLD: 1.51 (ref 0.83–1.09)
LYMPHOCYTES # BLD: 24.1 % (ref 8–40)
MAGNESIUM SERPL-MCNC: 1.8 MG/DL (ref 1.8–2.4)
MCH RBC QN AUTO: 25.5 PG (ref 25.7–33.7)
MCHC RBC AUTO-ENTMCNC: 32.4 G/DL (ref 32–35.9)
MCV RBC: 78.8 FL (ref 80–96)
MONOCYTES # BLD AUTO: 11.7 % (ref 3.8–10.2)
NEUTROPHILS # BLD: 61.6 % (ref 42.8–82.8)
PCO2 BLDV: 52.9 MMHG (ref 38–52)
PH BLDV: 7.39 [PH] (ref 7.31–7.41)
PLATELET # BLD AUTO: 210 K/MM3 (ref 134–434)
PMV BLD: 10.9 FL (ref 7.5–11.1)
POTASSIUM SERPLBLD-SCNC: 4.3 MMOL/L (ref 3.5–5.1)
PROT SERPL-MCNC: 6.6 G/DL (ref 6.4–8.2)
PT PNL PPP: 18 SEC (ref 9.7–13)
RBC # BLD AUTO: 4.89 M/MM3 (ref 4–5.6)
SAO2 % BLDV: 77 % (ref 70–80)
SODIUM SERPL-SCNC: 135 MMOL/L (ref 136–145)
WBC # BLD AUTO: 7.2 K/MM3 (ref 4–10)

## 2021-02-11 PROCEDURE — C9803 HOPD COVID-19 SPEC COLLECT: HCPCS

## 2021-02-11 PROCEDURE — G0378 HOSPITAL OBSERVATION PER HR: HCPCS

## 2021-02-11 PROCEDURE — M0239 BAMLANIVIMAB-XXXX INFUSION: HCPCS

## 2021-02-11 PROCEDURE — U0003 INFECTIOUS AGENT DETECTION BY NUCLEIC ACID (DNA OR RNA); SEVERE ACUTE RESPIRATORY SYNDROME CORONAVIRUS 2 (SARS-COV-2) (CORONAVIRUS DISEASE [COVID-19]), AMPLIFIED PROBE TECHNIQUE, MAKING USE OF HIGH THROUGHPUT TECHNOLOGIES AS DESCRIBED BY CMS-2020-01-R: HCPCS

## 2021-02-12 LAB
ALBUMIN SERPL-MCNC: 3.3 G/DL (ref 3.4–5)
ALP SERPL-CCNC: 97 U/L (ref 45–117)
ALT SERPL-CCNC: 16 U/L (ref 13–61)
ANION GAP SERPL CALC-SCNC: 3 MMOL/L (ref 8–16)
APPEARANCE UR: CLEAR
AST SERPL-CCNC: 20 U/L (ref 15–37)
BACTERIA # UR AUTO: 66 /UL (ref 0–1359)
BASOPHILS # BLD: 0.7 % (ref 0–2)
BILIRUB SERPL-MCNC: 0.7 MG/DL (ref 0.2–1)
BILIRUB UR STRIP.AUTO-MCNC: NEGATIVE MG/DL
BUN SERPL-MCNC: 21.2 MG/DL (ref 7–18)
CALCIUM SERPL-MCNC: 8.8 MG/DL (ref 8.5–10.1)
CASTS URNS QL MICRO: 0 /UL (ref 0–3.1)
CHLORIDE SERPL-SCNC: 102 MMOL/L (ref 98–107)
CO2 SERPL-SCNC: 33 MMOL/L (ref 21–32)
COLOR UR: YELLOW
CREAT SERPL-MCNC: 1 MG/DL (ref 0.55–1.3)
DEPRECATED RDW RBC AUTO: 15.3 % (ref 11.9–15.9)
EOSINOPHIL # BLD: 1.6 % (ref 0–4.5)
EPITH CASTS URNS QL MICRO: 13 /UL (ref 0–25.1)
GLUCOSE SERPL-MCNC: 187 MG/DL (ref 74–106)
HCT VFR BLD CALC: 38.7 % (ref 35.4–49)
HGB BLD-MCNC: 12.7 GM/DL (ref 11.7–16.9)
KETONES UR QL STRIP: NEGATIVE
LEUKOCYTE ESTERASE UR QL STRIP.AUTO: (no result)
LYMPHOCYTES # BLD: 25.7 % (ref 8–40)
MAGNESIUM SERPL-MCNC: 1.8 MG/DL (ref 1.8–2.4)
MCH RBC QN AUTO: 25.7 PG (ref 25.7–33.7)
MCHC RBC AUTO-ENTMCNC: 32.9 G/DL (ref 32–35.9)
MCV RBC: 78.1 FL (ref 80–96)
MONOCYTES # BLD AUTO: 10.1 % (ref 3.8–10.2)
NEUTROPHILS # BLD: 61.9 % (ref 42.8–82.8)
NITRITE UR QL STRIP: NEGATIVE
PH UR: 5.5 [PH] (ref 5–8)
PHOSPHATE SERPL-MCNC: 3.6 MG/DL (ref 2.5–4.9)
PLATELET # BLD AUTO: 215 K/MM3 (ref 134–434)
PMV BLD: 10.5 FL (ref 7.5–11.1)
POTASSIUM SERPLBLD-SCNC: 4 MMOL/L (ref 3.5–5.1)
PROT SERPL-MCNC: 6.5 G/DL (ref 6.4–8.2)
PROT UR QL STRIP: (no result)
PROT UR QL STRIP: (no result)
RBC # BLD AUTO: 4.96 M/MM3 (ref 4–5.6)
RBC # BLD AUTO: 8 /UL (ref 0–23.9)
SODIUM SERPL-SCNC: 138 MMOL/L (ref 136–145)
SP GR UR: 1.01 (ref 1.01–1.03)
UROBILINOGEN UR STRIP-MCNC: 0.2 MG/DL (ref 0.2–1)
WBC # BLD AUTO: 7.4 K/MM3 (ref 4–10)
WBC # UR AUTO: 229 /UL (ref 0–25.8)

## 2021-02-12 RX ADMIN — PRAMIPEXOLE DIHYDROCHLORIDE SCH MG: 0.5 TABLET ORAL at 21:16

## 2021-02-12 RX ADMIN — CARBIDOPA AND LEVODOPA SCH EACH: 25; 250 TABLET ORAL at 17:17

## 2021-02-12 RX ADMIN — CARBIDOPA AND LEVODOPA SCH EACH: 25; 250 TABLET ORAL at 09:34

## 2021-02-12 RX ADMIN — INSULIN ASPART SCH UNIT: 100 INJECTION, SOLUTION INTRAVENOUS; SUBCUTANEOUS at 08:55

## 2021-02-12 RX ADMIN — INSULIN ASPART SCH UNIT: 100 INJECTION, SOLUTION INTRAVENOUS; SUBCUTANEOUS at 17:16

## 2021-02-12 RX ADMIN — Medication SCH MG: at 09:34

## 2021-02-12 RX ADMIN — PRAMIPEXOLE DIHYDROCHLORIDE SCH MG: 0.25 TABLET ORAL at 13:55

## 2021-02-12 RX ADMIN — ENOXAPARIN SODIUM SCH MG: 40 INJECTION SUBCUTANEOUS at 09:34

## 2021-02-12 RX ADMIN — INSULIN ASPART SCH UNIT: 100 INJECTION, SOLUTION INTRAVENOUS; SUBCUTANEOUS at 12:04

## 2021-02-12 RX ADMIN — OXYCODONE HYDROCHLORIDE AND ACETAMINOPHEN SCH MG: 500 TABLET ORAL at 21:16

## 2021-02-12 RX ADMIN — INSULIN ASPART SCH UNIT: 100 INJECTION, SOLUTION INTRAVENOUS; SUBCUTANEOUS at 22:01

## 2021-02-12 RX ADMIN — OXYCODONE HYDROCHLORIDE AND ACETAMINOPHEN SCH MG: 500 TABLET ORAL at 09:34

## 2021-02-12 RX ADMIN — CARBIDOPA AND LEVODOPA SCH EACH: 25; 250 TABLET ORAL at 13:55

## 2021-02-12 RX ADMIN — VITAMIN D, TAB 1000IU (100/BT) SCH UNIT: 25 TAB at 09:34

## 2021-02-13 PROCEDURE — XW033H6 INTRODUCTION OF OTHER NEW TECHNOLOGY MONOCLONAL ANTIBODY INTO PERIPHERAL VEIN, PERCUTANEOUS APPROACH, NEW TECHNOLOGY GROUP 6: ICD-10-PCS | Performed by: INTERNAL MEDICINE

## 2021-02-13 RX ADMIN — VITAMIN D, TAB 1000IU (100/BT) SCH UNIT: 25 TAB at 10:00

## 2021-02-13 RX ADMIN — FUROSEMIDE SCH MG: 40 TABLET ORAL at 11:16

## 2021-02-13 RX ADMIN — PRAMIPEXOLE DIHYDROCHLORIDE SCH MG: 0.25 TABLET ORAL at 13:07

## 2021-02-13 RX ADMIN — PRAMIPEXOLE DIHYDROCHLORIDE SCH MG: 0.25 TABLET ORAL at 06:10

## 2021-02-13 RX ADMIN — CARBIDOPA AND LEVODOPA SCH EACH: 25; 250 TABLET ORAL at 13:07

## 2021-02-13 RX ADMIN — ENOXAPARIN SODIUM SCH MG: 40 INJECTION SUBCUTANEOUS at 10:00

## 2021-02-13 RX ADMIN — PRAMIPEXOLE DIHYDROCHLORIDE SCH MG: 0.5 TABLET ORAL at 22:14

## 2021-02-13 RX ADMIN — CARBIDOPA AND LEVODOPA SCH EACH: 25; 250 TABLET ORAL at 17:52

## 2021-02-13 RX ADMIN — CARVEDILOL SCH MG: 3.12 TABLET, FILM COATED ORAL at 22:21

## 2021-02-13 RX ADMIN — INSULIN ASPART SCH: 100 INJECTION, SOLUTION INTRAVENOUS; SUBCUTANEOUS at 06:11

## 2021-02-13 RX ADMIN — INSULIN ASPART SCH UNIT: 100 INJECTION, SOLUTION INTRAVENOUS; SUBCUTANEOUS at 16:14

## 2021-02-13 RX ADMIN — Medication SCH MG: at 10:00

## 2021-02-13 RX ADMIN — INSULIN ASPART SCH UNIT: 100 INJECTION, SOLUTION INTRAVENOUS; SUBCUTANEOUS at 11:33

## 2021-02-13 RX ADMIN — OXYCODONE HYDROCHLORIDE AND ACETAMINOPHEN SCH MG: 500 TABLET ORAL at 10:00

## 2021-02-13 RX ADMIN — CARBIDOPA AND LEVODOPA SCH EACH: 25; 250 TABLET ORAL at 10:00

## 2021-02-13 RX ADMIN — CARBIDOPA AND LEVODOPA SCH EACH: 25; 250 TABLET ORAL at 06:10

## 2021-02-13 RX ADMIN — CEFTRIAXONE SCH MLS/HR: 1 INJECTION, POWDER, FOR SOLUTION INTRAMUSCULAR; INTRAVENOUS at 11:16

## 2021-02-13 RX ADMIN — OXYCODONE HYDROCHLORIDE AND ACETAMINOPHEN SCH MG: 500 TABLET ORAL at 22:14

## 2021-02-14 RX ADMIN — CEFTRIAXONE SCH MLS/HR: 1 INJECTION, POWDER, FOR SOLUTION INTRAMUSCULAR; INTRAVENOUS at 10:44

## 2021-02-14 RX ADMIN — CARBIDOPA AND LEVODOPA SCH EACH: 25; 250 TABLET ORAL at 17:44

## 2021-02-14 RX ADMIN — CARBIDOPA AND LEVODOPA SCH EACH: 25; 250 TABLET ORAL at 06:42

## 2021-02-14 RX ADMIN — INSULIN ASPART SCH UNIT: 100 INJECTION, SOLUTION INTRAVENOUS; SUBCUTANEOUS at 12:58

## 2021-02-14 RX ADMIN — CARBIDOPA AND LEVODOPA SCH EACH: 25; 250 TABLET ORAL at 09:43

## 2021-02-14 RX ADMIN — VITAMIN D, TAB 1000IU (100/BT) SCH UNIT: 25 TAB at 10:43

## 2021-02-14 RX ADMIN — ATORVASTATIN CALCIUM SCH MG: 20 TABLET, FILM COATED ORAL at 22:22

## 2021-02-14 RX ADMIN — INSULIN ASPART SCH: 100 INJECTION, SOLUTION INTRAVENOUS; SUBCUTANEOUS at 17:43

## 2021-02-14 RX ADMIN — PANTOPRAZOLE SODIUM SCH MG: 40 TABLET, DELAYED RELEASE ORAL at 10:43

## 2021-02-14 RX ADMIN — CARVEDILOL SCH MG: 3.12 TABLET, FILM COATED ORAL at 22:22

## 2021-02-14 RX ADMIN — PRAMIPEXOLE DIHYDROCHLORIDE SCH MG: 0.25 TABLET ORAL at 12:59

## 2021-02-14 RX ADMIN — PRAMIPEXOLE DIHYDROCHLORIDE SCH MG: 0.25 TABLET ORAL at 06:29

## 2021-02-14 RX ADMIN — FUROSEMIDE SCH MG: 40 TABLET ORAL at 10:43

## 2021-02-14 RX ADMIN — INSULIN ASPART SCH: 100 INJECTION, SOLUTION INTRAVENOUS; SUBCUTANEOUS at 06:41

## 2021-02-14 RX ADMIN — ENOXAPARIN SODIUM SCH MG: 40 INJECTION SUBCUTANEOUS at 10:45

## 2021-02-14 RX ADMIN — PRAMIPEXOLE DIHYDROCHLORIDE SCH MG: 0.5 TABLET ORAL at 22:22

## 2021-02-14 RX ADMIN — CARBIDOPA AND LEVODOPA SCH EACH: 25; 250 TABLET ORAL at 12:59

## 2021-02-14 RX ADMIN — AMOXICILLIN AND CLAVULANATE POTASSIUM SCH TAB: 875; 125 TABLET, FILM COATED ORAL at 17:44

## 2021-02-14 RX ADMIN — Medication SCH MG: at 10:44

## 2021-02-14 RX ADMIN — INSULIN ASPART SCH UNIT: 100 INJECTION, SOLUTION INTRAVENOUS; SUBCUTANEOUS at 22:40

## 2021-02-14 RX ADMIN — CITALOPRAM HYDROBROMIDE SCH MG: 20 TABLET ORAL at 10:43

## 2021-02-14 RX ADMIN — OXYCODONE HYDROCHLORIDE AND ACETAMINOPHEN SCH MG: 500 TABLET ORAL at 10:44

## 2021-02-15 RX ADMIN — PRAMIPEXOLE DIHYDROCHLORIDE SCH MG: 0.25 TABLET ORAL at 06:35

## 2021-02-15 RX ADMIN — INSULIN ASPART SCH UNIT: 100 INJECTION, SOLUTION INTRAVENOUS; SUBCUTANEOUS at 11:00

## 2021-02-15 RX ADMIN — PRAMIPEXOLE DIHYDROCHLORIDE SCH MG: 0.25 TABLET ORAL at 13:17

## 2021-02-15 RX ADMIN — CARBIDOPA AND LEVODOPA SCH EACH: 25; 250 TABLET ORAL at 09:12

## 2021-02-15 RX ADMIN — PANTOPRAZOLE SODIUM SCH MG: 40 TABLET, DELAYED RELEASE ORAL at 09:11

## 2021-02-15 RX ADMIN — CARBIDOPA AND LEVODOPA SCH EACH: 25; 250 TABLET ORAL at 13:17

## 2021-02-15 RX ADMIN — ATORVASTATIN CALCIUM SCH MG: 20 TABLET, FILM COATED ORAL at 22:44

## 2021-02-15 RX ADMIN — FUROSEMIDE SCH MG: 40 TABLET ORAL at 09:12

## 2021-02-15 RX ADMIN — VITAMIN D, TAB 1000IU (100/BT) SCH UNIT: 25 TAB at 09:15

## 2021-02-15 RX ADMIN — CARBIDOPA AND LEVODOPA SCH EACH: 25; 250 TABLET ORAL at 17:11

## 2021-02-15 RX ADMIN — PRAMIPEXOLE DIHYDROCHLORIDE SCH MG: 0.5 TABLET ORAL at 22:44

## 2021-02-15 RX ADMIN — ENOXAPARIN SODIUM SCH MG: 40 INJECTION SUBCUTANEOUS at 09:10

## 2021-02-15 RX ADMIN — CARBIDOPA AND LEVODOPA SCH EACH: 25; 250 TABLET ORAL at 06:35

## 2021-02-15 RX ADMIN — INSULIN ASPART SCH: 100 INJECTION, SOLUTION INTRAVENOUS; SUBCUTANEOUS at 06:35

## 2021-02-15 RX ADMIN — INSULIN ASPART SCH: 100 INJECTION, SOLUTION INTRAVENOUS; SUBCUTANEOUS at 22:55

## 2021-02-15 RX ADMIN — CARVEDILOL SCH MG: 3.12 TABLET, FILM COATED ORAL at 22:44

## 2021-02-15 RX ADMIN — AMPICILLIN SODIUM AND SULBACTAM SODIUM SCH MLS/HR: 1; .5 INJECTION, POWDER, FOR SOLUTION INTRAMUSCULAR; INTRAVENOUS at 12:50

## 2021-02-15 RX ADMIN — AMPICILLIN SODIUM AND SULBACTAM SODIUM SCH MLS/HR: 1; .5 INJECTION, POWDER, FOR SOLUTION INTRAMUSCULAR; INTRAVENOUS at 22:45

## 2021-02-15 RX ADMIN — AMOXICILLIN AND CLAVULANATE POTASSIUM SCH TAB: 875; 125 TABLET, FILM COATED ORAL at 09:12

## 2021-02-15 RX ADMIN — CITALOPRAM HYDROBROMIDE SCH MG: 20 TABLET ORAL at 09:12

## 2021-02-15 RX ADMIN — AMPICILLIN SODIUM AND SULBACTAM SODIUM SCH MLS/HR: 1; .5 INJECTION, POWDER, FOR SOLUTION INTRAMUSCULAR; INTRAVENOUS at 14:56

## 2021-02-15 RX ADMIN — INSULIN ASPART SCH UNIT: 100 INJECTION, SOLUTION INTRAVENOUS; SUBCUTANEOUS at 16:26

## 2021-02-15 RX ADMIN — Medication SCH TAB: at 12:49

## 2021-02-16 LAB
ALBUMIN SERPL-MCNC: 3 G/DL (ref 3.4–5)
ALP SERPL-CCNC: 80 U/L (ref 45–117)
ALT SERPL-CCNC: 11 U/L (ref 13–61)
ANION GAP SERPL CALC-SCNC: 4 MMOL/L (ref 8–16)
AST SERPL-CCNC: 17 U/L (ref 15–37)
BASOPHILS # BLD: 1.6 % (ref 0–2)
BILIRUB DIRECT SERPL-MCNC: 166 U/L (ref 87–246)
BILIRUB SERPL-MCNC: 0.7 MG/DL (ref 0.2–1)
BUN SERPL-MCNC: 26.6 MG/DL (ref 7–18)
CALCIUM SERPL-MCNC: 8.8 MG/DL (ref 8.5–10.1)
CHLORIDE SERPL-SCNC: 102 MMOL/L (ref 98–107)
CO2 SERPL-SCNC: 34 MMOL/L (ref 21–32)
CREAT SERPL-MCNC: 1 MG/DL (ref 0.55–1.3)
DEPRECATED RDW RBC AUTO: 15.8 % (ref 11.9–15.9)
EOSINOPHIL # BLD: 3.9 % (ref 0–4.5)
GLUCOSE SERPL-MCNC: 147 MG/DL (ref 74–106)
HCT VFR BLD CALC: 37.7 % (ref 35.4–49)
HGB BLD-MCNC: 12.2 GM/DL (ref 11.7–16.9)
LYMPHOCYTES # BLD: 19.1 % (ref 8–40)
MAGNESIUM SERPL-MCNC: 2.1 MG/DL (ref 1.8–2.4)
MCH RBC QN AUTO: 25.4 PG (ref 25.7–33.7)
MCHC RBC AUTO-ENTMCNC: 32.4 G/DL (ref 32–35.9)
MCV RBC: 78.2 FL (ref 80–96)
MONOCYTES # BLD AUTO: 15.5 % (ref 3.8–10.2)
NEUTROPHILS # BLD: 59.9 % (ref 42.8–82.8)
PLATELET # BLD AUTO: 201 K/MM3 (ref 134–434)
PMV BLD: 10.7 FL (ref 7.5–11.1)
POTASSIUM SERPLBLD-SCNC: 4.1 MMOL/L (ref 3.5–5.1)
PROT SERPL-MCNC: 5.8 G/DL (ref 6.4–8.2)
RBC # BLD AUTO: 4.82 M/MM3 (ref 4–5.6)
SODIUM SERPL-SCNC: 139 MMOL/L (ref 136–145)
WBC # BLD AUTO: 6.6 K/MM3 (ref 4–10)

## 2021-02-16 RX ADMIN — CARBIDOPA AND LEVODOPA SCH EACH: 25; 250 TABLET ORAL at 09:24

## 2021-02-16 RX ADMIN — CARBIDOPA AND LEVODOPA SCH EACH: 25; 250 TABLET ORAL at 14:12

## 2021-02-16 RX ADMIN — PRAMIPEXOLE DIHYDROCHLORIDE SCH MG: 0.25 TABLET ORAL at 14:12

## 2021-02-16 RX ADMIN — CITALOPRAM HYDROBROMIDE SCH MG: 20 TABLET ORAL at 09:25

## 2021-02-16 RX ADMIN — INSULIN ASPART SCH UNIT: 100 INJECTION, SOLUTION INTRAVENOUS; SUBCUTANEOUS at 10:53

## 2021-02-16 RX ADMIN — AMPICILLIN SODIUM AND SULBACTAM SODIUM SCH MLS/HR: 1; .5 INJECTION, POWDER, FOR SOLUTION INTRAMUSCULAR; INTRAVENOUS at 21:56

## 2021-02-16 RX ADMIN — PANTOPRAZOLE SODIUM SCH MG: 40 TABLET, DELAYED RELEASE ORAL at 09:24

## 2021-02-16 RX ADMIN — DOCUSATE SODIUM PRN MG: 100 CAPSULE, LIQUID FILLED ORAL at 21:56

## 2021-02-16 RX ADMIN — FUROSEMIDE SCH MG: 40 TABLET ORAL at 09:25

## 2021-02-16 RX ADMIN — ATORVASTATIN CALCIUM SCH MG: 20 TABLET, FILM COATED ORAL at 21:56

## 2021-02-16 RX ADMIN — AMPICILLIN SODIUM AND SULBACTAM SODIUM SCH MLS/HR: 1; .5 INJECTION, POWDER, FOR SOLUTION INTRAMUSCULAR; INTRAVENOUS at 09:24

## 2021-02-16 RX ADMIN — INSULIN ASPART SCH UNIT: 100 INJECTION, SOLUTION INTRAVENOUS; SUBCUTANEOUS at 17:06

## 2021-02-16 RX ADMIN — INSULIN ASPART SCH UNIT: 100 INJECTION, SOLUTION INTRAVENOUS; SUBCUTANEOUS at 21:56

## 2021-02-16 RX ADMIN — VITAMIN D, TAB 1000IU (100/BT) SCH UNIT: 25 TAB at 09:25

## 2021-02-16 RX ADMIN — CARBIDOPA AND LEVODOPA SCH EACH: 25; 250 TABLET ORAL at 06:36

## 2021-02-16 RX ADMIN — PRAMIPEXOLE DIHYDROCHLORIDE SCH MG: 0.5 TABLET ORAL at 21:56

## 2021-02-16 RX ADMIN — CARVEDILOL SCH MG: 3.12 TABLET, FILM COATED ORAL at 21:56

## 2021-02-16 RX ADMIN — Medication SCH TAB: at 09:25

## 2021-02-16 RX ADMIN — AMPICILLIN SODIUM AND SULBACTAM SODIUM SCH MLS/HR: 1; .5 INJECTION, POWDER, FOR SOLUTION INTRAMUSCULAR; INTRAVENOUS at 03:54

## 2021-02-16 RX ADMIN — AMPICILLIN SODIUM AND SULBACTAM SODIUM SCH MLS/HR: 1; .5 INJECTION, POWDER, FOR SOLUTION INTRAMUSCULAR; INTRAVENOUS at 17:59

## 2021-02-16 RX ADMIN — CARBIDOPA AND LEVODOPA SCH EACH: 25; 250 TABLET ORAL at 17:06

## 2021-02-16 RX ADMIN — INSULIN ASPART SCH UNIT: 100 INJECTION, SOLUTION INTRAVENOUS; SUBCUTANEOUS at 06:36

## 2021-02-16 RX ADMIN — PRAMIPEXOLE DIHYDROCHLORIDE SCH MG: 0.25 TABLET ORAL at 06:36

## 2021-02-17 LAB
ALBUMIN SERPL-MCNC: 3 G/DL (ref 3.4–5)
ALP SERPL-CCNC: 88 U/L (ref 45–117)
ALT SERPL-CCNC: 11 U/L (ref 13–61)
ANION GAP SERPL CALC-SCNC: 3 MMOL/L (ref 8–16)
AST SERPL-CCNC: 22 U/L (ref 15–37)
BASOPHILS # BLD: 0.9 % (ref 0–2)
BILIRUB DIRECT SERPL-MCNC: 169 U/L (ref 87–246)
BILIRUB SERPL-MCNC: 0.8 MG/DL (ref 0.2–1)
BUN SERPL-MCNC: 23.4 MG/DL (ref 7–18)
CALCIUM SERPL-MCNC: 8.9 MG/DL (ref 8.5–10.1)
CHLORIDE SERPL-SCNC: 100 MMOL/L (ref 98–107)
CO2 SERPL-SCNC: 34 MMOL/L (ref 21–32)
CREAT SERPL-MCNC: 0.9 MG/DL (ref 0.55–1.3)
DEPRECATED RDW RBC AUTO: 15.9 % (ref 11.9–15.9)
EOSINOPHIL # BLD: 3.2 % (ref 0–4.5)
GLUCOSE SERPL-MCNC: 189 MG/DL (ref 74–106)
HCT VFR BLD CALC: 37.3 % (ref 35.4–49)
HGB BLD-MCNC: 12.2 GM/DL (ref 11.7–16.9)
LYMPHOCYTES # BLD: 25.1 % (ref 8–40)
MAGNESIUM SERPL-MCNC: 2.1 MG/DL (ref 1.8–2.4)
MCH RBC QN AUTO: 25.6 PG (ref 25.7–33.7)
MCHC RBC AUTO-ENTMCNC: 32.9 G/DL (ref 32–35.9)
MCV RBC: 77.8 FL (ref 80–96)
MONOCYTES # BLD AUTO: 15.3 % (ref 3.8–10.2)
NEUTROPHILS # BLD: 55.5 % (ref 42.8–82.8)
PLATELET # BLD AUTO: 183 K/MM3 (ref 134–434)
PMV BLD: 10.9 FL (ref 7.5–11.1)
POTASSIUM SERPLBLD-SCNC: 4.5 MMOL/L (ref 3.5–5.1)
PROT SERPL-MCNC: 6 G/DL (ref 6.4–8.2)
RBC # BLD AUTO: 4.79 M/MM3 (ref 4–5.6)
SODIUM SERPL-SCNC: 137 MMOL/L (ref 136–145)
WBC # BLD AUTO: 6.2 K/MM3 (ref 4–10)

## 2021-02-17 RX ADMIN — INSULIN ASPART SCH: 100 INJECTION, SOLUTION INTRAVENOUS; SUBCUTANEOUS at 21:37

## 2021-02-17 RX ADMIN — CARBIDOPA AND LEVODOPA SCH EACH: 25; 250 TABLET ORAL at 18:13

## 2021-02-17 RX ADMIN — CARBIDOPA AND LEVODOPA SCH EACH: 25; 250 TABLET ORAL at 13:35

## 2021-02-17 RX ADMIN — ATORVASTATIN CALCIUM SCH MG: 20 TABLET, FILM COATED ORAL at 21:21

## 2021-02-17 RX ADMIN — PRAMIPEXOLE DIHYDROCHLORIDE SCH MG: 0.25 TABLET ORAL at 13:35

## 2021-02-17 RX ADMIN — CARBIDOPA AND LEVODOPA SCH EACH: 25; 250 TABLET ORAL at 05:28

## 2021-02-17 RX ADMIN — CITALOPRAM HYDROBROMIDE SCH MG: 20 TABLET ORAL at 09:20

## 2021-02-17 RX ADMIN — INSULIN ASPART SCH UNIT: 100 INJECTION, SOLUTION INTRAVENOUS; SUBCUTANEOUS at 18:12

## 2021-02-17 RX ADMIN — AMPICILLIN SODIUM AND SULBACTAM SODIUM SCH MLS/HR: 1; .5 INJECTION, POWDER, FOR SOLUTION INTRAMUSCULAR; INTRAVENOUS at 02:15

## 2021-02-17 RX ADMIN — AMPICILLIN SODIUM AND SULBACTAM SODIUM SCH MLS/HR: 1; .5 INJECTION, POWDER, FOR SOLUTION INTRAMUSCULAR; INTRAVENOUS at 09:19

## 2021-02-17 RX ADMIN — AMPICILLIN SODIUM AND SULBACTAM SODIUM SCH MLS/HR: 1; .5 INJECTION, POWDER, FOR SOLUTION INTRAMUSCULAR; INTRAVENOUS at 14:43

## 2021-02-17 RX ADMIN — PRAMIPEXOLE DIHYDROCHLORIDE SCH MG: 0.5 TABLET ORAL at 21:21

## 2021-02-17 RX ADMIN — ENOXAPARIN SODIUM SCH MG: 40 INJECTION SUBCUTANEOUS at 09:21

## 2021-02-17 RX ADMIN — PANTOPRAZOLE SODIUM SCH MG: 40 TABLET, DELAYED RELEASE ORAL at 09:21

## 2021-02-17 RX ADMIN — DOCUSATE SODIUM PRN MG: 100 CAPSULE, LIQUID FILLED ORAL at 09:20

## 2021-02-17 RX ADMIN — CARBIDOPA AND LEVODOPA SCH EACH: 25; 250 TABLET ORAL at 09:21

## 2021-02-17 RX ADMIN — Medication SCH TAB: at 09:20

## 2021-02-17 RX ADMIN — INSULIN ASPART SCH: 100 INJECTION, SOLUTION INTRAVENOUS; SUBCUTANEOUS at 07:00

## 2021-02-17 RX ADMIN — DOCUSATE SODIUM PRN MG: 100 CAPSULE, LIQUID FILLED ORAL at 21:21

## 2021-02-17 RX ADMIN — VITAMIN D, TAB 1000IU (100/BT) SCH UNIT: 25 TAB at 09:20

## 2021-02-17 RX ADMIN — FUROSEMIDE SCH MG: 40 TABLET ORAL at 09:20

## 2021-02-17 RX ADMIN — PRAMIPEXOLE DIHYDROCHLORIDE SCH MG: 0.25 TABLET ORAL at 05:28

## 2021-02-17 RX ADMIN — INSULIN ASPART SCH UNIT: 100 INJECTION, SOLUTION INTRAVENOUS; SUBCUTANEOUS at 12:32

## 2021-02-17 RX ADMIN — CARVEDILOL SCH MG: 3.12 TABLET, FILM COATED ORAL at 21:21

## 2021-02-18 VITALS — SYSTOLIC BLOOD PRESSURE: 120 MMHG | DIASTOLIC BLOOD PRESSURE: 68 MMHG | HEART RATE: 80 BPM | TEMPERATURE: 98.2 F

## 2021-02-18 RX ADMIN — Medication SCH TAB: at 12:15

## 2021-02-18 RX ADMIN — ENOXAPARIN SODIUM SCH MG: 40 INJECTION SUBCUTANEOUS at 12:16

## 2021-02-18 RX ADMIN — VITAMIN D, TAB 1000IU (100/BT) SCH UNIT: 25 TAB at 12:17

## 2021-02-18 RX ADMIN — PRAMIPEXOLE DIHYDROCHLORIDE SCH: 0.25 TABLET ORAL at 15:00

## 2021-02-18 RX ADMIN — INSULIN ASPART SCH UNIT: 100 INJECTION, SOLUTION INTRAVENOUS; SUBCUTANEOUS at 08:16

## 2021-02-18 RX ADMIN — CARBIDOPA AND LEVODOPA SCH EACH: 25; 250 TABLET ORAL at 06:19

## 2021-02-18 RX ADMIN — CITALOPRAM HYDROBROMIDE SCH MG: 20 TABLET ORAL at 12:16

## 2021-02-18 RX ADMIN — PANTOPRAZOLE SODIUM SCH MG: 40 TABLET, DELAYED RELEASE ORAL at 12:17

## 2021-02-18 RX ADMIN — FUROSEMIDE SCH MG: 40 TABLET ORAL at 12:16

## 2021-02-18 RX ADMIN — INSULIN ASPART SCH UNIT: 100 INJECTION, SOLUTION INTRAVENOUS; SUBCUTANEOUS at 12:15

## 2021-02-18 RX ADMIN — PRAMIPEXOLE DIHYDROCHLORIDE SCH MG: 0.25 TABLET ORAL at 06:19

## 2021-02-18 RX ADMIN — CARBIDOPA AND LEVODOPA SCH EACH: 25; 250 TABLET ORAL at 12:17

## 2021-02-18 RX ADMIN — CARBIDOPA AND LEVODOPA SCH: 25; 250 TABLET ORAL at 15:00

## 2021-09-13 ENCOUNTER — HOSPITAL ENCOUNTER (INPATIENT)
Dept: HOSPITAL 74 - JER | Age: 76
LOS: 4 days | Discharge: NURSING, REHAB OR LONG-TERM CARE | DRG: 57 | End: 2021-09-17
Attending: FAMILY MEDICINE | Admitting: INTERNAL MEDICINE
Payer: COMMERCIAL

## 2021-09-13 VITALS — BODY MASS INDEX: 25.4 KG/M2

## 2021-09-13 DIAGNOSIS — I13.0: ICD-10-CM

## 2021-09-13 DIAGNOSIS — Z95.0: ICD-10-CM

## 2021-09-13 DIAGNOSIS — I45.2: ICD-10-CM

## 2021-09-13 DIAGNOSIS — J44.9: ICD-10-CM

## 2021-09-13 DIAGNOSIS — Z86.16: ICD-10-CM

## 2021-09-13 DIAGNOSIS — R26.2: ICD-10-CM

## 2021-09-13 DIAGNOSIS — N18.9: ICD-10-CM

## 2021-09-13 DIAGNOSIS — I25.10: ICD-10-CM

## 2021-09-13 DIAGNOSIS — E66.3: ICD-10-CM

## 2021-09-13 DIAGNOSIS — G25.81: ICD-10-CM

## 2021-09-13 DIAGNOSIS — E11.22: ICD-10-CM

## 2021-09-13 DIAGNOSIS — I50.32: ICD-10-CM

## 2021-09-13 DIAGNOSIS — N17.9: ICD-10-CM

## 2021-09-13 DIAGNOSIS — G20: Primary | ICD-10-CM

## 2021-09-13 DIAGNOSIS — E78.5: ICD-10-CM

## 2021-09-13 LAB
ALBUMIN SERPL-MCNC: 3.4 G/DL (ref 3.4–5)
ALP SERPL-CCNC: 114 U/L (ref 45–117)
ALT SERPL-CCNC: 15 U/L (ref 13–61)
ANION GAP SERPL CALC-SCNC: 7 MMOL/L (ref 8–16)
APPEARANCE UR: CLEAR
AST SERPL-CCNC: 20 U/L (ref 15–37)
BACTERIA # UR AUTO: 8 /UL (ref 0–1359)
BASOPHILS # BLD: 1 % (ref 0–2)
BILIRUB SERPL-MCNC: 0.6 MG/DL (ref 0.2–1)
BILIRUB UR STRIP.AUTO-MCNC: NEGATIVE MG/DL
BUN SERPL-MCNC: 38.3 MG/DL (ref 7–18)
CALCIUM SERPL-MCNC: 9.1 MG/DL (ref 8.5–10.1)
CASTS URNS QL MICRO: 0 /UL (ref 0–3.1)
CHLORIDE SERPL-SCNC: 102 MMOL/L (ref 98–107)
CO2 SERPL-SCNC: 30 MMOL/L (ref 21–32)
COLOR UR: YELLOW
CREAT SERPL-MCNC: 1.6 MG/DL (ref 0.55–1.3)
DEPRECATED RDW RBC AUTO: 17.6 % (ref 11.9–15.9)
EOSINOPHIL # BLD: 2.2 % (ref 0–4.5)
EPITH CASTS URNS QL MICRO: 3 /UL (ref 0–25.1)
GLUCOSE SERPL-MCNC: 227 MG/DL (ref 74–106)
HCT VFR BLD CALC: 37.2 % (ref 35.4–49)
HGB BLD-MCNC: 12.2 GM/DL (ref 11.7–16.9)
IRON SERPL-MCNC: 50 UG/DL (ref 50–175)
KETONES UR QL STRIP: NEGATIVE
LEUKOCYTE ESTERASE UR QL STRIP.AUTO: NEGATIVE
LYMPHOCYTES # BLD: 17.9 % (ref 8–40)
MCH RBC QN AUTO: 25.6 PG (ref 25.7–33.7)
MCHC RBC AUTO-ENTMCNC: 32.7 G/DL (ref 32–35.9)
MCV RBC: 78.1 FL (ref 80–96)
MONOCYTES # BLD AUTO: 10.8 % (ref 3.8–10.2)
NEUTROPHILS # BLD: 68.1 % (ref 42.8–82.8)
NITRITE UR QL STRIP: NEGATIVE
PH UR: 5.5 [PH] (ref 5–8)
PLATELET # BLD AUTO: 165 10^3/UL (ref 134–434)
PMV BLD: 10.6 FL (ref 7.5–11.1)
PROT SERPL-MCNC: 6.7 G/DL (ref 6.4–8.2)
PROT UR QL STRIP: (no result)
PROT UR QL STRIP: NEGATIVE
RBC # BLD AUTO: 2 /UL (ref 0–23.9)
RBC # BLD AUTO: 4.76 M/MM3 (ref 4–5.6)
SODIUM SERPL-SCNC: 138 MMOL/L (ref 136–145)
SP GR UR: 1.01 (ref 1.01–1.03)
TIBC SERPL-MCNC: 220 UG/DL (ref 250–450)
UROBILINOGEN UR STRIP-MCNC: 1 MG/DL (ref 0.2–1)
WBC # BLD AUTO: 9.4 K/MM3 (ref 4–10)
WBC # UR AUTO: 7 /UL (ref 0–25.8)

## 2021-09-13 PROCEDURE — U0003 INFECTIOUS AGENT DETECTION BY NUCLEIC ACID (DNA OR RNA); SEVERE ACUTE RESPIRATORY SYNDROME CORONAVIRUS 2 (SARS-COV-2) (CORONAVIRUS DISEASE [COVID-19]), AMPLIFIED PROBE TECHNIQUE, MAKING USE OF HIGH THROUGHPUT TECHNOLOGIES AS DESCRIBED BY CMS-2020-01-R: HCPCS

## 2021-09-13 PROCEDURE — C9803 HOPD COVID-19 SPEC COLLECT: HCPCS

## 2021-09-13 PROCEDURE — U0005 INFEC AGEN DETEC AMPLI PROBE: HCPCS

## 2021-09-14 LAB
ANION GAP SERPL CALC-SCNC: 3 MMOL/L (ref 8–16)
APPEARANCE UR: CLEAR
BACTERIA # UR AUTO: 10 /UL (ref 0–1359)
BASOPHILS # BLD: 1 % (ref 0–2)
BILIRUB UR STRIP.AUTO-MCNC: NEGATIVE MG/DL
BUN SERPL-MCNC: 34 MG/DL (ref 7–18)
CALCIUM SERPL-MCNC: 9 MG/DL (ref 8.5–10.1)
CASTS URNS QL MICRO: 0 /UL (ref 0–3.1)
CHLORIDE SERPL-SCNC: 104 MMOL/L (ref 98–107)
CHOLEST SERPL-MCNC: 120 MG/DL (ref 50–200)
CO2 SERPL-SCNC: 32 MMOL/L (ref 21–32)
COLOR UR: YELLOW
CREAT SERPL-MCNC: 1.3 MG/DL (ref 0.55–1.3)
DEPRECATED RDW RBC AUTO: 17.5 % (ref 11.9–15.9)
EOSINOPHIL # BLD: 2.1 % (ref 0–4.5)
EPITH CASTS URNS QL MICRO: 3 /UL (ref 0–25.1)
GLUCOSE SERPL-MCNC: 122 MG/DL (ref 74–106)
HCT VFR BLD CALC: 36.7 % (ref 35.4–49)
HDLC SERPL-MCNC: 69 MG/DL (ref 40–60)
HGB BLD-MCNC: 12.1 GM/DL (ref 11.7–16.9)
KETONES UR QL STRIP: NEGATIVE
LDLC SERPL CALC-MCNC: 41 MG/DL (ref 5–100)
LEUKOCYTE ESTERASE UR QL STRIP.AUTO: NEGATIVE
LYMPHOCYTES # BLD: 20.5 % (ref 8–40)
MCH RBC QN AUTO: 25.7 PG (ref 25.7–33.7)
MCHC RBC AUTO-ENTMCNC: 33 G/DL (ref 32–35.9)
MCV RBC: 78 FL (ref 80–96)
MONOCYTES # BLD AUTO: 10.6 % (ref 3.8–10.2)
NEUTROPHILS # BLD: 65.8 % (ref 42.8–82.8)
NITRITE UR QL STRIP: NEGATIVE
PH UR: 6 [PH] (ref 5–8)
PLATELET # BLD AUTO: 157 10^3/UL (ref 134–434)
PMV BLD: 11.2 FL (ref 7.5–11.1)
PROT UR QL STRIP: (no result)
PROT UR QL STRIP: NEGATIVE
RBC # BLD AUTO: 4.71 M/MM3 (ref 4–5.6)
RBC # BLD AUTO: 8 /UL (ref 0–23.9)
SODIUM SERPL-SCNC: 139 MMOL/L (ref 136–145)
SP GR UR: 1.02 (ref 1.01–1.03)
TRIGL SERPL-MCNC: 43 MG/DL (ref 0–150)
UROBILINOGEN UR STRIP-MCNC: 1 MG/DL (ref 0.2–1)
WBC # BLD AUTO: 8.2 K/MM3 (ref 4–10)
WBC # UR AUTO: 6 /UL (ref 0–25.8)

## 2021-09-14 RX ADMIN — CITALOPRAM HYDROBROMIDE SCH MG: 10 TABLET ORAL at 10:11

## 2021-09-14 RX ADMIN — ATORVASTATIN CALCIUM SCH: 20 TABLET, FILM COATED ORAL at 22:10

## 2021-09-14 RX ADMIN — INSULIN ASPART SCH UNITS: 100 INJECTION, SOLUTION INTRAVENOUS; SUBCUTANEOUS at 16:59

## 2021-09-14 RX ADMIN — INSULIN ASPART SCH: 100 INJECTION, SOLUTION INTRAVENOUS; SUBCUTANEOUS at 22:22

## 2021-09-14 RX ADMIN — CARBIDOPA AND LEVODOPA SCH EACH: 25; 250 TABLET ORAL at 06:38

## 2021-09-14 RX ADMIN — INSULIN ASPART SCH: 100 INJECTION, SOLUTION INTRAVENOUS; SUBCUTANEOUS at 11:06

## 2021-09-14 RX ADMIN — QUETIAPINE FUMARATE SCH: 100 TABLET ORAL at 22:10

## 2021-09-14 RX ADMIN — PRAMIPEXOLE DIHYDROCHLORIDE SCH MG: 0.25 TABLET ORAL at 14:03

## 2021-09-14 RX ADMIN — CARVEDILOL SCH: 3.12 TABLET, FILM COATED ORAL at 22:10

## 2021-09-14 RX ADMIN — CARVEDILOL SCH MG: 3.12 TABLET, FILM COATED ORAL at 03:10

## 2021-09-14 RX ADMIN — CARBIDOPA AND LEVODOPA SCH EACH: 25; 250 TABLET ORAL at 17:33

## 2021-09-14 RX ADMIN — PRAMIPEXOLE DIHYDROCHLORIDE SCH MG: 0.25 TABLET ORAL at 06:38

## 2021-09-14 RX ADMIN — POLYETHYLENE GLYCOL 3350 SCH GM: 17 POWDER, FOR SOLUTION ORAL at 10:11

## 2021-09-14 RX ADMIN — CARBIDOPA AND LEVODOPA SCH EACH: 25; 250 TABLET ORAL at 14:03

## 2021-09-14 RX ADMIN — PRAMIPEXOLE DIHYDROCHLORIDE SCH: 0.5 TABLET ORAL at 21:15

## 2021-09-14 RX ADMIN — PANTOPRAZOLE SODIUM SCH MG: 40 TABLET, DELAYED RELEASE ORAL at 10:11

## 2021-09-14 RX ADMIN — VITAMIN D, TAB 1000IU (100/BT) SCH UNIT: 25 TAB at 10:12

## 2021-09-14 RX ADMIN — CARBIDOPA AND LEVODOPA SCH EACH: 25; 250 TABLET ORAL at 10:12

## 2021-09-15 RX ADMIN — PANTOPRAZOLE SODIUM SCH MG: 40 TABLET, DELAYED RELEASE ORAL at 10:05

## 2021-09-15 RX ADMIN — CARBIDOPA AND LEVODOPA SCH EACH: 25; 250 TABLET ORAL at 14:50

## 2021-09-15 RX ADMIN — QUETIAPINE FUMARATE SCH MG: 100 TABLET ORAL at 21:46

## 2021-09-15 RX ADMIN — INSULIN ASPART SCH: 100 INJECTION, SOLUTION INTRAVENOUS; SUBCUTANEOUS at 21:53

## 2021-09-15 RX ADMIN — INSULIN ASPART SCH UNITS: 100 INJECTION, SOLUTION INTRAVENOUS; SUBCUTANEOUS at 17:06

## 2021-09-15 RX ADMIN — ATORVASTATIN CALCIUM SCH MG: 20 TABLET, FILM COATED ORAL at 21:46

## 2021-09-15 RX ADMIN — CARBIDOPA AND LEVODOPA SCH EACH: 25; 250 TABLET ORAL at 10:06

## 2021-09-15 RX ADMIN — PRAMIPEXOLE DIHYDROCHLORIDE SCH: 0.5 TABLET ORAL at 21:54

## 2021-09-15 RX ADMIN — CARBIDOPA AND LEVODOPA SCH EACH: 25; 250 TABLET ORAL at 17:42

## 2021-09-15 RX ADMIN — CITALOPRAM HYDROBROMIDE SCH MG: 10 TABLET ORAL at 10:05

## 2021-09-15 RX ADMIN — VITAMIN D, TAB 1000IU (100/BT) SCH UNIT: 25 TAB at 10:05

## 2021-09-15 RX ADMIN — POLYETHYLENE GLYCOL 3350 SCH GM: 17 POWDER, FOR SOLUTION ORAL at 10:05

## 2021-09-15 RX ADMIN — INSULIN ASPART SCH: 100 INJECTION, SOLUTION INTRAVENOUS; SUBCUTANEOUS at 06:12

## 2021-09-15 RX ADMIN — INSULIN ASPART SCH UNITS: 100 INJECTION, SOLUTION INTRAVENOUS; SUBCUTANEOUS at 11:50

## 2021-09-15 RX ADMIN — CARBIDOPA AND LEVODOPA SCH EACH: 25; 250 TABLET ORAL at 06:08

## 2021-09-15 RX ADMIN — CARVEDILOL SCH MG: 3.12 TABLET, FILM COATED ORAL at 21:47

## 2021-09-16 LAB
ALBUMIN SERPL-MCNC: 3 G/DL (ref 3.4–5)
ALP SERPL-CCNC: 93 U/L (ref 45–117)
ALT SERPL-CCNC: 9 U/L (ref 13–61)
ANION GAP SERPL CALC-SCNC: 5 MMOL/L (ref 8–16)
AST SERPL-CCNC: 20 U/L (ref 15–37)
BILIRUB SERPL-MCNC: 0.9 MG/DL (ref 0.2–1)
BUN SERPL-MCNC: 35.1 MG/DL (ref 7–18)
CALCIUM SERPL-MCNC: 9 MG/DL (ref 8.5–10.1)
CHLORIDE SERPL-SCNC: 105 MMOL/L (ref 98–107)
CO2 SERPL-SCNC: 30 MMOL/L (ref 21–32)
CREAT SERPL-MCNC: 1.3 MG/DL (ref 0.55–1.3)
GLUCOSE SERPL-MCNC: 164 MG/DL (ref 74–106)
PROT SERPL-MCNC: 6.2 G/DL (ref 6.4–8.2)
SODIUM SERPL-SCNC: 139 MMOL/L (ref 136–145)

## 2021-09-16 RX ADMIN — CARBIDOPA AND LEVODOPA SCH EACH: 25; 250 TABLET ORAL at 14:01

## 2021-09-16 RX ADMIN — ASPIRIN 81 MG SCH MG: 81 TABLET ORAL at 14:01

## 2021-09-16 RX ADMIN — INSULIN ASPART SCH: 100 INJECTION, SOLUTION INTRAVENOUS; SUBCUTANEOUS at 22:26

## 2021-09-16 RX ADMIN — CARBIDOPA AND LEVODOPA SCH EACH: 25; 250 TABLET ORAL at 06:42

## 2021-09-16 RX ADMIN — CARVEDILOL SCH MG: 3.12 TABLET, FILM COATED ORAL at 09:51

## 2021-09-16 RX ADMIN — PANTOPRAZOLE SODIUM SCH MG: 40 TABLET, DELAYED RELEASE ORAL at 09:51

## 2021-09-16 RX ADMIN — QUETIAPINE FUMARATE SCH MG: 100 TABLET ORAL at 22:23

## 2021-09-16 RX ADMIN — CARVEDILOL SCH MG: 3.12 TABLET, FILM COATED ORAL at 22:23

## 2021-09-16 RX ADMIN — ATORVASTATIN CALCIUM SCH MG: 20 TABLET, FILM COATED ORAL at 22:23

## 2021-09-16 RX ADMIN — CITALOPRAM HYDROBROMIDE SCH MG: 10 TABLET ORAL at 09:51

## 2021-09-16 RX ADMIN — INSULIN DETEMIR SCH UNITS: 100 INJECTION, SOLUTION SUBCUTANEOUS at 06:40

## 2021-09-16 RX ADMIN — PRAMIPEXOLE DIHYDROCHLORIDE SCH MG: 0.5 TABLET ORAL at 22:29

## 2021-09-16 RX ADMIN — CARBIDOPA AND LEVODOPA SCH EACH: 25; 250 TABLET ORAL at 18:10

## 2021-09-16 RX ADMIN — POLYETHYLENE GLYCOL 3350 SCH GM: 17 POWDER, FOR SOLUTION ORAL at 09:51

## 2021-09-16 RX ADMIN — INSULIN ASPART SCH: 100 INJECTION, SOLUTION INTRAVENOUS; SUBCUTANEOUS at 18:10

## 2021-09-16 RX ADMIN — INSULIN ASPART SCH UNITS: 100 INJECTION, SOLUTION INTRAVENOUS; SUBCUTANEOUS at 06:41

## 2021-09-16 RX ADMIN — CARBIDOPA AND LEVODOPA SCH EACH: 25; 250 TABLET ORAL at 10:00

## 2021-09-16 RX ADMIN — INSULIN ASPART SCH: 100 INJECTION, SOLUTION INTRAVENOUS; SUBCUTANEOUS at 13:36

## 2021-09-16 RX ADMIN — VITAMIN D, TAB 1000IU (100/BT) SCH UNIT: 25 TAB at 09:51

## 2021-09-17 RX ADMIN — INSULIN DETEMIR SCH UNITS: 100 INJECTION, SOLUTION SUBCUTANEOUS at 06:03

## 2021-09-17 RX ADMIN — INSULIN ASPART SCH: 100 INJECTION, SOLUTION INTRAVENOUS; SUBCUTANEOUS at 11:19

## 2021-09-17 RX ADMIN — CARVEDILOL SCH MG: 3.12 TABLET, FILM COATED ORAL at 11:15

## 2021-09-17 RX ADMIN — CITALOPRAM HYDROBROMIDE SCH MG: 10 TABLET ORAL at 11:14

## 2021-09-17 RX ADMIN — ASPIRIN 81 MG SCH MG: 81 TABLET ORAL at 11:13

## 2021-09-17 RX ADMIN — CARBIDOPA AND LEVODOPA SCH EACH: 25; 250 TABLET ORAL at 14:30

## 2021-09-17 RX ADMIN — POLYETHYLENE GLYCOL 3350 SCH GM: 17 POWDER, FOR SOLUTION ORAL at 11:14

## 2021-09-17 RX ADMIN — VITAMIN D, TAB 1000IU (100/BT) SCH UNIT: 25 TAB at 11:14

## 2021-09-17 RX ADMIN — CARBIDOPA AND LEVODOPA SCH EACH: 25; 250 TABLET ORAL at 11:16

## 2021-09-17 RX ADMIN — INSULIN ASPART SCH: 100 INJECTION, SOLUTION INTRAVENOUS; SUBCUTANEOUS at 06:03

## 2021-09-17 RX ADMIN — PANTOPRAZOLE SODIUM SCH MG: 40 TABLET, DELAYED RELEASE ORAL at 11:14

## 2021-09-17 RX ADMIN — CARVEDILOL SCH MG: 3.12 TABLET, FILM COATED ORAL at 22:11

## 2021-09-17 RX ADMIN — QUETIAPINE FUMARATE SCH MG: 100 TABLET ORAL at 22:11

## 2021-09-17 RX ADMIN — PRAMIPEXOLE DIHYDROCHLORIDE SCH MG: 0.5 TABLET ORAL at 22:21

## 2021-09-17 RX ADMIN — CARBIDOPA AND LEVODOPA SCH EACH: 25; 250 TABLET ORAL at 05:57

## 2021-09-17 RX ADMIN — INSULIN ASPART SCH: 100 INJECTION, SOLUTION INTRAVENOUS; SUBCUTANEOUS at 16:45

## 2021-09-17 RX ADMIN — ATORVASTATIN CALCIUM SCH MG: 20 TABLET, FILM COATED ORAL at 22:11

## 2021-09-17 RX ADMIN — CARBIDOPA AND LEVODOPA SCH EACH: 25; 250 TABLET ORAL at 17:55

## 2021-09-17 RX ADMIN — INSULIN ASPART SCH: 100 INJECTION, SOLUTION INTRAVENOUS; SUBCUTANEOUS at 22:11

## 2021-09-18 VITALS — HEART RATE: 64 BPM | DIASTOLIC BLOOD PRESSURE: 54 MMHG | SYSTOLIC BLOOD PRESSURE: 113 MMHG | TEMPERATURE: 98.3 F

## 2021-10-12 ENCOUNTER — HOSPITAL ENCOUNTER (INPATIENT)
Dept: HOSPITAL 74 - JER | Age: 76
LOS: 5 days | Discharge: NURSING, REHAB OR LONG-TERM CARE | DRG: 563 | End: 2021-10-17
Attending: FAMILY MEDICINE | Admitting: FAMILY MEDICINE
Payer: COMMERCIAL

## 2021-10-12 VITALS — BODY MASS INDEX: 24.1 KG/M2

## 2021-10-12 DIAGNOSIS — F03.91: ICD-10-CM

## 2021-10-12 DIAGNOSIS — R44.3: ICD-10-CM
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
## 2021-10-12 DIAGNOSIS — M79.622: ICD-10-CM

## 2021-10-12 DIAGNOSIS — S42.202A: Primary | ICD-10-CM

## 2021-10-12 DIAGNOSIS — I50.42: ICD-10-CM

## 2021-10-12 DIAGNOSIS — I95.1: ICD-10-CM

## 2021-10-12 DIAGNOSIS — R60.9: ICD-10-CM
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
## 2021-10-12 DIAGNOSIS — J44.9: ICD-10-CM

## 2021-10-12 DIAGNOSIS — R33.9: ICD-10-CM

## 2021-10-12 DIAGNOSIS — I13.0: ICD-10-CM

## 2021-10-12 DIAGNOSIS — Z98.61: ICD-10-CM
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
## 2021-10-12 DIAGNOSIS — I47.2: ICD-10-CM
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
## 2021-10-12 DIAGNOSIS — N18.9: ICD-10-CM

## 2021-10-12 DIAGNOSIS — I25.10: ICD-10-CM

## 2021-10-12 DIAGNOSIS — N17.9: ICD-10-CM

## 2021-10-12 DIAGNOSIS — E11.22: ICD-10-CM

## 2021-10-12 DIAGNOSIS — R64: ICD-10-CM

## 2021-10-12 DIAGNOSIS — G20: ICD-10-CM

## 2021-10-12 DIAGNOSIS — E78.5: ICD-10-CM

## 2021-10-12 LAB
ALBUMIN SERPL-MCNC: 3.4 G/DL (ref 3.4–5)
ALP SERPL-CCNC: 108 U/L (ref 45–117)
ALT SERPL-CCNC: 9 U/L (ref 13–61)
ANION GAP SERPL CALC-SCNC: 9 MMOL/L (ref 8–16)
AST SERPL-CCNC: 27 U/L (ref 15–37)
BASOPHILS # BLD: 0.8 % (ref 0–2)
BILIRUB SERPL-MCNC: 1.2 MG/DL (ref 0.2–1)
BUN SERPL-MCNC: 29.1 MG/DL (ref 7–18)
CALCIUM SERPL-MCNC: 9.5 MG/DL (ref 8.5–10.1)
CHLORIDE SERPL-SCNC: 102 MMOL/L (ref 98–107)
CO2 SERPL-SCNC: 29 MMOL/L (ref 21–32)
CREAT SERPL-MCNC: 1.5 MG/DL (ref 0.55–1.3)
DEPRECATED RDW RBC AUTO: 19.8 % (ref 11.9–15.9)
EOSINOPHIL # BLD: 1.8 % (ref 0–4.5)
GLUCOSE SERPL-MCNC: 193 MG/DL (ref 74–106)
HCT VFR BLD CALC: 38.1 % (ref 35.4–49)
HGB BLD-MCNC: 12.3 GM/DL (ref 11.7–16.9)
LYMPHOCYTES # BLD: 14.7 % (ref 8–40)
MCH RBC QN AUTO: 26 PG (ref 25.7–33.7)
MCHC RBC AUTO-ENTMCNC: 32.4 G/DL (ref 32–35.9)
MCV RBC: 80.2 FL (ref 80–96)
MONOCYTES # BLD AUTO: 7 % (ref 3.8–10.2)
NEUTROPHILS # BLD: 75.7 % (ref 42.8–82.8)
PLATELET # BLD AUTO: 118 10^3/UL (ref 134–434)
PMV BLD: 11.5 FL (ref 7.5–11.1)
PROT SERPL-MCNC: 6.9 G/DL (ref 6.4–8.2)
RBC # BLD AUTO: 4.75 M/MM3 (ref 4–5.6)
SODIUM SERPL-SCNC: 140 MMOL/L (ref 136–145)
WBC # BLD AUTO: 8 K/MM3 (ref 4–10)

## 2021-10-12 PROCEDURE — C9803 HOPD COVID-19 SPEC COLLECT: HCPCS

## 2021-10-12 PROCEDURE — U0005 INFEC AGEN DETEC AMPLI PROBE: HCPCS

## 2021-10-12 PROCEDURE — G0378 HOSPITAL OBSERVATION PER HR: HCPCS

## 2021-10-12 PROCEDURE — U0003 INFECTIOUS AGENT DETECTION BY NUCLEIC ACID (DNA OR RNA); SEVERE ACUTE RESPIRATORY SYNDROME CORONAVIRUS 2 (SARS-COV-2) (CORONAVIRUS DISEASE [COVID-19]), AMPLIFIED PROBE TECHNIQUE, MAKING USE OF HIGH THROUGHPUT TECHNOLOGIES AS DESCRIBED BY CMS-2020-01-R: HCPCS

## 2021-10-12 RX ADMIN — POTASSIUM CHLORIDE, DEXTROSE MONOHYDRATE AND SODIUM CHLORIDE SCH MLS/HR: 150; 5; 450 INJECTION, SOLUTION INTRAVENOUS at 22:34

## 2021-10-12 RX ADMIN — CARBIDOPA AND LEVODOPA SCH EACH: 25; 250 TABLET ORAL at 18:36

## 2021-10-12 RX ADMIN — CARVEDILOL SCH MG: 3.12 TABLET, FILM COATED ORAL at 21:55

## 2021-10-12 RX ADMIN — HEPARIN SODIUM SCH UNIT: 5000 INJECTION, SOLUTION INTRAVENOUS; SUBCUTANEOUS at 21:55

## 2021-10-12 RX ADMIN — PRAMIPEXOLE DIHYDROCHLORIDE SCH MG: 0.5 TABLET ORAL at 22:16

## 2021-10-13 LAB
ALBUMIN SERPL-MCNC: 3 G/DL (ref 3.4–5)
ALP SERPL-CCNC: 95 U/L (ref 45–117)
ALT SERPL-CCNC: 9 U/L (ref 13–61)
ANION GAP SERPL CALC-SCNC: 8 MMOL/L (ref 8–16)
APPEARANCE UR: CLEAR
APPEARANCE UR: CLEAR
AST SERPL-CCNC: 18 U/L (ref 15–37)
BACTERIA # UR AUTO: 68 /UL (ref 0–1359)
BACTERIA # UR AUTO: 7 /UL (ref 0–1359)
BASOPHILS # BLD: 0.6 % (ref 0–2)
BILIRUB SERPL-MCNC: 1.4 MG/DL (ref 0.2–1)
BILIRUB UR STRIP.AUTO-MCNC: NEGATIVE MG/DL
BILIRUB UR STRIP.AUTO-MCNC: NEGATIVE MG/DL
BUN SERPL-MCNC: 26.5 MG/DL (ref 7–18)
CALCIUM SERPL-MCNC: 8.1 MG/DL (ref 8.5–10.1)
CASTS URNS QL MICRO: 1 /UL (ref 0–3.1)
CASTS URNS QL MICRO: 4 /UL (ref 0–3.1)
CHLORIDE SERPL-SCNC: 105 MMOL/L (ref 98–107)
CHOLEST SERPL-MCNC: 93 MG/DL (ref 50–200)
CO2 SERPL-SCNC: 27 MMOL/L (ref 21–32)
COLOR UR: YELLOW
COLOR UR: YELLOW
CREAT SERPL-MCNC: 1.3 MG/DL (ref 0.55–1.3)
DEPRECATED RDW RBC AUTO: 19.7 % (ref 11.9–15.9)
EOSINOPHIL # BLD: 1.7 % (ref 0–4.5)
EPITH CASTS URNS QL MICRO: 12 /UL (ref 0–25.1)
EPITH CASTS URNS QL MICRO: 12 /UL (ref 0–25.1)
GLUCOSE SERPL-MCNC: 281 MG/DL (ref 74–106)
HCT VFR BLD CALC: 32.9 % (ref 35.4–49)
HDLC SERPL-MCNC: 56 MG/DL (ref 40–60)
HGB BLD-MCNC: 10.6 GM/DL (ref 11.7–16.9)
KETONES UR QL STRIP: (no result)
KETONES UR QL STRIP: (no result)
LDLC SERPL CALC-MCNC: 29 MG/DL (ref 5–100)
LEUKOCYTE ESTERASE UR QL STRIP.AUTO: (no result)
LEUKOCYTE ESTERASE UR QL STRIP.AUTO: NEGATIVE
LYMPHOCYTES # BLD: 11.1 % (ref 8–40)
MAGNESIUM SERPL-MCNC: 1.9 MG/DL (ref 1.8–2.4)
MCH RBC QN AUTO: 25.7 PG (ref 25.7–33.7)
MCHC RBC AUTO-ENTMCNC: 32.1 G/DL (ref 32–35.9)
MCV RBC: 80.2 FL (ref 80–96)
MONOCYTES # BLD AUTO: 9.9 % (ref 3.8–10.2)
NEUTROPHILS # BLD: 76.7 % (ref 42.8–82.8)
NITRITE UR QL STRIP: NEGATIVE
NITRITE UR QL STRIP: NEGATIVE
PH UR: 5.5 [PH] (ref 5–8)
PH UR: 5.5 [PH] (ref 5–8)
PLATELET # BLD AUTO: 126 10^3/UL (ref 134–434)
PMV BLD: 11.8 FL (ref 7.5–11.1)
PROT SERPL-MCNC: 6 G/DL (ref 6.4–8.2)
PROT UR QL STRIP: (no result)
RBC # BLD AUTO: 138 /UL (ref 0–23.9)
RBC # BLD AUTO: 20 /UL (ref 0–23.9)
RBC # BLD AUTO: 4.11 M/MM3 (ref 4–5.6)
SODIUM SERPL-SCNC: 140 MMOL/L (ref 136–145)
SP GR UR: 1.02 (ref 1.01–1.03)
SP GR UR: 1.02 (ref 1.01–1.03)
TRIGL SERPL-MCNC: 64 MG/DL (ref 0–150)
UROBILINOGEN UR STRIP-MCNC: 1 MG/DL (ref 0.2–1)
UROBILINOGEN UR STRIP-MCNC: 1 MG/DL (ref 0.2–1)
WBC # BLD AUTO: 8.5 K/MM3 (ref 4–10)
WBC # UR AUTO: 24 /UL (ref 0–25.8)
WBC # UR AUTO: 9 /UL (ref 0–25.8)

## 2021-10-13 RX ADMIN — PANTOPRAZOLE SODIUM SCH MG: 40 TABLET, DELAYED RELEASE ORAL at 09:40

## 2021-10-13 RX ADMIN — CARBIDOPA AND LEVODOPA SCH EACH: 25; 250 TABLET ORAL at 09:40

## 2021-10-13 RX ADMIN — CARVEDILOL SCH MG: 3.12 TABLET, FILM COATED ORAL at 09:40

## 2021-10-13 RX ADMIN — POTASSIUM CHLORIDE, DEXTROSE MONOHYDRATE AND SODIUM CHLORIDE SCH MLS/HR: 150; 5; 450 INJECTION, SOLUTION INTRAVENOUS at 21:46

## 2021-10-13 RX ADMIN — ASPIRIN 81 MG SCH MG: 81 TABLET ORAL at 09:39

## 2021-10-13 RX ADMIN — ACETAMINOPHEN PRN MG: 325 TABLET ORAL at 21:49

## 2021-10-13 RX ADMIN — ATORVASTATIN CALCIUM SCH MG: 20 TABLET, FILM COATED ORAL at 09:40

## 2021-10-13 RX ADMIN — INSULIN ASPART SCH UNIT: 100 INJECTION, SOLUTION INTRAVENOUS; SUBCUTANEOUS at 11:55

## 2021-10-13 RX ADMIN — CITALOPRAM HYDROBROMIDE SCH MG: 10 TABLET ORAL at 09:39

## 2021-10-13 RX ADMIN — POLYETHYLENE GLYCOL 3350 SCH GM: 17 POWDER, FOR SOLUTION ORAL at 09:50

## 2021-10-13 RX ADMIN — HEPARIN SODIUM SCH UNIT: 5000 INJECTION, SOLUTION INTRAVENOUS; SUBCUTANEOUS at 09:40

## 2021-10-13 RX ADMIN — PRAMIPEXOLE DIHYDROCHLORIDE SCH MG: 0.5 TABLET ORAL at 21:48

## 2021-10-13 RX ADMIN — ACETAMINOPHEN PRN MG: 325 TABLET ORAL at 14:21

## 2021-10-13 RX ADMIN — CARVEDILOL SCH MG: 3.12 TABLET, FILM COATED ORAL at 21:47

## 2021-10-13 RX ADMIN — INSULIN ASPART SCH: 100 INJECTION, SOLUTION INTRAVENOUS; SUBCUTANEOUS at 21:48

## 2021-10-13 RX ADMIN — HEPARIN SODIUM SCH UNIT: 5000 INJECTION, SOLUTION INTRAVENOUS; SUBCUTANEOUS at 21:46

## 2021-10-13 RX ADMIN — ACETAMINOPHEN PRN MG: 325 TABLET ORAL at 05:38

## 2021-10-13 RX ADMIN — ALLOPURINOL SCH MG: 300 TABLET ORAL at 09:40

## 2021-10-13 RX ADMIN — CARBIDOPA AND LEVODOPA SCH EACH: 25; 250 TABLET ORAL at 17:34

## 2021-10-13 RX ADMIN — CARBIDOPA AND LEVODOPA SCH EACH: 25; 250 TABLET ORAL at 13:54

## 2021-10-13 RX ADMIN — INSULIN ASPART SCH UNIT: 100 INJECTION, SOLUTION INTRAVENOUS; SUBCUTANEOUS at 16:35

## 2021-10-13 RX ADMIN — CARBIDOPA AND LEVODOPA SCH EACH: 25; 250 TABLET ORAL at 05:38

## 2021-10-14 LAB
ALBUMIN SERPL-MCNC: 2.8 G/DL (ref 3.4–5)
ALP SERPL-CCNC: 90 U/L (ref 45–117)
ALT SERPL-CCNC: 7 U/L (ref 13–61)
ANION GAP SERPL CALC-SCNC: 6 MMOL/L (ref 8–16)
AST SERPL-CCNC: 16 U/L (ref 15–37)
BASOPHILS # BLD: 1 % (ref 0–2)
BILIRUB SERPL-MCNC: 1.3 MG/DL (ref 0.2–1)
BUN SERPL-MCNC: 27.5 MG/DL (ref 7–18)
CALCIUM SERPL-MCNC: 8.4 MG/DL (ref 8.5–10.1)
CHLORIDE SERPL-SCNC: 103 MMOL/L (ref 98–107)
CO2 SERPL-SCNC: 27 MMOL/L (ref 21–32)
CREAT SERPL-MCNC: 1.5 MG/DL (ref 0.55–1.3)
DEPRECATED RDW RBC AUTO: 19.9 % (ref 11.9–15.9)
EOSINOPHIL # BLD: 5.1 % (ref 0–4.5)
GLUCOSE SERPL-MCNC: 247 MG/DL (ref 74–106)
HCT VFR BLD CALC: 30 % (ref 35.4–49)
HGB BLD-MCNC: 9.7 GM/DL (ref 11.7–16.9)
LYMPHOCYTES # BLD: 14.5 % (ref 8–40)
MCH RBC QN AUTO: 25.6 PG (ref 25.7–33.7)
MCHC RBC AUTO-ENTMCNC: 32.3 G/DL (ref 32–35.9)
MCV RBC: 79.1 FL (ref 80–96)
MONOCYTES # BLD AUTO: 9.1 % (ref 3.8–10.2)
NEUTROPHILS # BLD: 70.3 % (ref 42.8–82.8)
PLATELET # BLD AUTO: 105 10^3/UL (ref 134–434)
PMV BLD: 11.6 FL (ref 7.5–11.1)
PROT SERPL-MCNC: 5.8 G/DL (ref 6.4–8.2)
RBC # BLD AUTO: 3.79 M/MM3 (ref 4–5.6)
SODIUM SERPL-SCNC: 137 MMOL/L (ref 136–145)
WBC # BLD AUTO: 7.2 K/MM3 (ref 4–10)

## 2021-10-14 RX ADMIN — INSULIN ASPART SCH UNIT: 100 INJECTION, SOLUTION INTRAVENOUS; SUBCUTANEOUS at 12:00

## 2021-10-14 RX ADMIN — HEPARIN SODIUM SCH UNIT: 5000 INJECTION, SOLUTION INTRAVENOUS; SUBCUTANEOUS at 21:12

## 2021-10-14 RX ADMIN — CARVEDILOL SCH MG: 3.12 TABLET, FILM COATED ORAL at 09:15

## 2021-10-14 RX ADMIN — INSULIN ASPART SCH UNIT: 100 INJECTION, SOLUTION INTRAVENOUS; SUBCUTANEOUS at 17:04

## 2021-10-14 RX ADMIN — ATORVASTATIN CALCIUM SCH MG: 20 TABLET, FILM COATED ORAL at 09:15

## 2021-10-14 RX ADMIN — ACETAMINOPHEN PRN MG: 325 TABLET ORAL at 17:58

## 2021-10-14 RX ADMIN — ACETAMINOPHEN PRN MG: 325 TABLET ORAL at 11:56

## 2021-10-14 RX ADMIN — INSULIN ASPART SCH UNIT: 100 INJECTION, SOLUTION INTRAVENOUS; SUBCUTANEOUS at 21:13

## 2021-10-14 RX ADMIN — POLYETHYLENE GLYCOL 3350 SCH GM: 17 POWDER, FOR SOLUTION ORAL at 09:15

## 2021-10-14 RX ADMIN — CARBIDOPA AND LEVODOPA SCH EACH: 25; 250 TABLET ORAL at 06:45

## 2021-10-14 RX ADMIN — ALLOPURINOL SCH MG: 300 TABLET ORAL at 09:15

## 2021-10-14 RX ADMIN — CARBIDOPA AND LEVODOPA SCH EACH: 25; 250 TABLET ORAL at 09:15

## 2021-10-14 RX ADMIN — QUETIAPINE FUMARATE SCH MG: 100 TABLET ORAL at 21:12

## 2021-10-14 RX ADMIN — CARBIDOPA AND LEVODOPA SCH EACH: 25; 250 TABLET ORAL at 13:14

## 2021-10-14 RX ADMIN — INSULIN ASPART SCH UNIT: 100 INJECTION, SOLUTION INTRAVENOUS; SUBCUTANEOUS at 06:44

## 2021-10-14 RX ADMIN — ASPIRIN 81 MG SCH MG: 81 TABLET ORAL at 09:15

## 2021-10-14 RX ADMIN — CARBIDOPA AND LEVODOPA SCH EACH: 25; 250 TABLET ORAL at 17:04

## 2021-10-14 RX ADMIN — HEPARIN SODIUM SCH UNIT: 5000 INJECTION, SOLUTION INTRAVENOUS; SUBCUTANEOUS at 09:15

## 2021-10-14 RX ADMIN — CITALOPRAM HYDROBROMIDE SCH MG: 10 TABLET ORAL at 09:15

## 2021-10-14 RX ADMIN — PANTOPRAZOLE SODIUM SCH MG: 40 TABLET, DELAYED RELEASE ORAL at 09:15

## 2021-10-15 LAB
ALBUMIN SERPL-MCNC: 2.6 G/DL (ref 3.4–5)
ALP SERPL-CCNC: 91 U/L (ref 45–117)
ALT SERPL-CCNC: < 6 U/L (ref 13–61)
ANION GAP SERPL CALC-SCNC: 5 MMOL/L (ref 8–16)
AST SERPL-CCNC: 15 U/L (ref 15–37)
BASOPHILS # BLD: 0.9 % (ref 0–2)
BILIRUB SERPL-MCNC: 1.1 MG/DL (ref 0.2–1)
BUN SERPL-MCNC: 24.2 MG/DL (ref 7–18)
CALCIUM SERPL-MCNC: 8.2 MG/DL (ref 8.5–10.1)
CHLORIDE SERPL-SCNC: 105 MMOL/L (ref 98–107)
CO2 SERPL-SCNC: 30 MMOL/L (ref 21–32)
CREAT SERPL-MCNC: 1.3 MG/DL (ref 0.55–1.3)
DEPRECATED RDW RBC AUTO: 20 % (ref 11.9–15.9)
EOSINOPHIL # BLD: 6 % (ref 0–4.5)
GLUCOSE SERPL-MCNC: 154 MG/DL (ref 74–106)
HCT VFR BLD CALC: 29.5 % (ref 35.4–49)
HGB BLD-MCNC: 9.7 GM/DL (ref 11.7–16.9)
LYMPHOCYTES # BLD: 26.7 % (ref 8–40)
MCH RBC QN AUTO: 26.2 PG (ref 25.7–33.7)
MCHC RBC AUTO-ENTMCNC: 33 G/DL (ref 32–35.9)
MCV RBC: 79.3 FL (ref 80–96)
MONOCYTES # BLD AUTO: 11.8 % (ref 3.8–10.2)
NEUTROPHILS # BLD: 54.6 % (ref 42.8–82.8)
PLATELET # BLD AUTO: 116 10^3/UL (ref 134–434)
PMV BLD: 11.8 FL (ref 7.5–11.1)
PROT SERPL-MCNC: 5.7 G/DL (ref 6.4–8.2)
RBC # BLD AUTO: 3.71 M/MM3 (ref 4–5.6)
SODIUM SERPL-SCNC: 140 MMOL/L (ref 136–145)
WBC # BLD AUTO: 6.6 K/MM3 (ref 4–10)

## 2021-10-15 RX ADMIN — Medication SCH: at 07:52

## 2021-10-15 RX ADMIN — POLYETHYLENE GLYCOL 3350 SCH GM: 17 POWDER, FOR SOLUTION ORAL at 10:23

## 2021-10-15 RX ADMIN — INSULIN ASPART SCH UNIT: 100 INJECTION, SOLUTION INTRAVENOUS; SUBCUTANEOUS at 11:17

## 2021-10-15 RX ADMIN — ATORVASTATIN CALCIUM SCH MG: 20 TABLET, FILM COATED ORAL at 22:00

## 2021-10-15 RX ADMIN — CITALOPRAM HYDROBROMIDE SCH MG: 10 TABLET ORAL at 10:23

## 2021-10-15 RX ADMIN — ATORVASTATIN CALCIUM SCH: 20 TABLET, FILM COATED ORAL at 10:18

## 2021-10-15 RX ADMIN — ALLOPURINOL SCH MG: 300 TABLET ORAL at 10:23

## 2021-10-15 RX ADMIN — ACETAMINOPHEN PRN MG: 325 TABLET ORAL at 13:52

## 2021-10-15 RX ADMIN — HEPARIN SODIUM SCH UNIT: 5000 INJECTION, SOLUTION INTRAVENOUS; SUBCUTANEOUS at 10:23

## 2021-10-15 RX ADMIN — INSULIN ASPART SCH: 100 INJECTION, SOLUTION INTRAVENOUS; SUBCUTANEOUS at 06:35

## 2021-10-15 RX ADMIN — PANTOPRAZOLE SODIUM SCH MG: 40 TABLET, DELAYED RELEASE ORAL at 10:23

## 2021-10-15 RX ADMIN — HEPARIN SODIUM SCH UNIT: 5000 INJECTION, SOLUTION INTRAVENOUS; SUBCUTANEOUS at 22:01

## 2021-10-15 RX ADMIN — QUETIAPINE FUMARATE SCH MG: 100 TABLET ORAL at 22:00

## 2021-10-15 RX ADMIN — Medication SCH: at 07:51

## 2021-10-15 RX ADMIN — INSULIN ASPART SCH UNIT: 100 INJECTION, SOLUTION INTRAVENOUS; SUBCUTANEOUS at 17:22

## 2021-10-15 RX ADMIN — INSULIN ASPART SCH: 100 INJECTION, SOLUTION INTRAVENOUS; SUBCUTANEOUS at 22:01

## 2021-10-15 RX ADMIN — ASPIRIN 81 MG SCH MG: 81 TABLET ORAL at 10:23

## 2021-10-16 RX ADMIN — HEPARIN SODIUM SCH UNIT: 5000 INJECTION, SOLUTION INTRAVENOUS; SUBCUTANEOUS at 21:10

## 2021-10-16 RX ADMIN — ASPIRIN 81 MG SCH MG: 81 TABLET ORAL at 09:28

## 2021-10-16 RX ADMIN — INSULIN ASPART SCH: 100 INJECTION, SOLUTION INTRAVENOUS; SUBCUTANEOUS at 06:01

## 2021-10-16 RX ADMIN — ACETAMINOPHEN PRN MG: 325 TABLET ORAL at 21:11

## 2021-10-16 RX ADMIN — TAMSULOSIN HYDROCHLORIDE SCH MG: 0.4 CAPSULE ORAL at 09:27

## 2021-10-16 RX ADMIN — ALLOPURINOL SCH MG: 300 TABLET ORAL at 09:27

## 2021-10-16 RX ADMIN — INSULIN ASPART SCH: 100 INJECTION, SOLUTION INTRAVENOUS; SUBCUTANEOUS at 21:12

## 2021-10-16 RX ADMIN — POLYETHYLENE GLYCOL 3350 SCH: 17 POWDER, FOR SOLUTION ORAL at 09:37

## 2021-10-16 RX ADMIN — ATORVASTATIN CALCIUM SCH MG: 20 TABLET, FILM COATED ORAL at 21:11

## 2021-10-16 RX ADMIN — PANTOPRAZOLE SODIUM SCH MG: 40 TABLET, DELAYED RELEASE ORAL at 09:28

## 2021-10-16 RX ADMIN — INSULIN ASPART SCH UNIT: 100 INJECTION, SOLUTION INTRAVENOUS; SUBCUTANEOUS at 11:52

## 2021-10-16 RX ADMIN — HEPARIN SODIUM SCH UNIT: 5000 INJECTION, SOLUTION INTRAVENOUS; SUBCUTANEOUS at 09:28

## 2021-10-16 RX ADMIN — CITALOPRAM HYDROBROMIDE SCH MG: 10 TABLET ORAL at 09:27

## 2021-10-16 RX ADMIN — LISINOPRIL SCH MG: 5 TABLET ORAL at 13:03

## 2021-10-16 RX ADMIN — QUETIAPINE FUMARATE SCH MG: 100 TABLET ORAL at 21:11

## 2021-10-16 RX ADMIN — INSULIN ASPART SCH UNIT: 100 INJECTION, SOLUTION INTRAVENOUS; SUBCUTANEOUS at 17:23

## 2021-10-17 VITALS — TEMPERATURE: 98.6 F | DIASTOLIC BLOOD PRESSURE: 46 MMHG | SYSTOLIC BLOOD PRESSURE: 92 MMHG | HEART RATE: 89 BPM

## 2021-10-17 LAB
ALBUMIN SERPL-MCNC: 2.5 G/DL (ref 3.4–5)
ALP SERPL-CCNC: 94 U/L (ref 45–117)
ALT SERPL-CCNC: < 6 U/L (ref 13–61)
ANION GAP SERPL CALC-SCNC: 9 MMOL/L (ref 8–16)
ANISOCYTOSIS BLD QL: (no result)
AST SERPL-CCNC: 18 U/L (ref 15–37)
BILIRUB SERPL-MCNC: 1.4 MG/DL (ref 0.2–1)
BUN SERPL-MCNC: 28.3 MG/DL (ref 7–18)
CALCIUM SERPL-MCNC: 8.3 MG/DL (ref 8.5–10.1)
CHLORIDE SERPL-SCNC: 103 MMOL/L (ref 98–107)
CO2 SERPL-SCNC: 25 MMOL/L (ref 21–32)
CREAT SERPL-MCNC: 1.5 MG/DL (ref 0.55–1.3)
DEPRECATED RDW RBC AUTO: 19.9 % (ref 11.9–15.9)
GLUCOSE SERPL-MCNC: 275 MG/DL (ref 74–106)
HCT VFR BLD CALC: 29.1 % (ref 35.4–49)
HGB BLD-MCNC: 9.8 GM/DL (ref 11.7–16.9)
MACROCYTES BLD QL: 0
MCH RBC QN AUTO: 26.6 PG (ref 25.7–33.7)
MCHC RBC AUTO-ENTMCNC: 33.8 G/DL (ref 32–35.9)
MCV RBC: 78.7 FL (ref 80–96)
OVALOCYTES BLD QL SMEAR: (no result)
PLATELET # BLD AUTO: 123 10^3/UL (ref 134–434)
PLATELET BLD QL SMEAR: NORMAL
PMV BLD: 11.8 FL (ref 7.5–11.1)
PROT SERPL-MCNC: 5.6 G/DL (ref 6.4–8.2)
RBC # BLD AUTO: 3.7 M/MM3 (ref 4–5.6)
SODIUM SERPL-SCNC: 137 MMOL/L (ref 136–145)
WBC # BLD AUTO: 8.6 K/MM3 (ref 4–10)

## 2021-10-17 RX ADMIN — HEPARIN SODIUM SCH UNIT: 5000 INJECTION, SOLUTION INTRAVENOUS; SUBCUTANEOUS at 10:29

## 2021-10-17 RX ADMIN — TAMSULOSIN HYDROCHLORIDE SCH MG: 0.4 CAPSULE ORAL at 08:50

## 2021-10-17 RX ADMIN — INSULIN ASPART SCH UNIT: 100 INJECTION, SOLUTION INTRAVENOUS; SUBCUTANEOUS at 06:53

## 2021-10-17 RX ADMIN — CITALOPRAM HYDROBROMIDE SCH MG: 10 TABLET ORAL at 10:30

## 2021-10-17 RX ADMIN — ACETAMINOPHEN PRN MG: 325 TABLET ORAL at 01:20

## 2021-10-17 RX ADMIN — PANTOPRAZOLE SODIUM SCH MG: 40 TABLET, DELAYED RELEASE ORAL at 10:30

## 2021-10-17 RX ADMIN — ASPIRIN 81 MG SCH MG: 81 TABLET ORAL at 10:30

## 2021-10-17 RX ADMIN — LISINOPRIL SCH MG: 5 TABLET ORAL at 10:30

## 2021-10-17 RX ADMIN — ALLOPURINOL SCH MG: 300 TABLET ORAL at 10:30

## 2021-10-17 RX ADMIN — LISINOPRIL SCH: 5 TABLET ORAL at 10:35

## 2021-10-17 RX ADMIN — POLYETHYLENE GLYCOL 3350 SCH GM: 17 POWDER, FOR SOLUTION ORAL at 10:30

## 2021-10-17 RX ADMIN — INSULIN ASPART SCH UNIT: 100 INJECTION, SOLUTION INTRAVENOUS; SUBCUTANEOUS at 11:01

## 2022-03-03 ENCOUNTER — HOSPITAL ENCOUNTER (INPATIENT)
Dept: HOSPITAL 74 - JER | Age: 77
LOS: 16 days | Discharge: HOME HEALTH | DRG: 871 | End: 2022-03-19
Attending: FAMILY MEDICINE | Admitting: HOSPITALIST
Payer: COMMERCIAL

## 2022-03-03 VITALS — BODY MASS INDEX: 24.6 KG/M2

## 2022-03-03 DIAGNOSIS — R41.82: ICD-10-CM

## 2022-03-03 DIAGNOSIS — L89.132: ICD-10-CM

## 2022-03-03 DIAGNOSIS — L89.610: ICD-10-CM

## 2022-03-03 DIAGNOSIS — R62.7: ICD-10-CM

## 2022-03-03 DIAGNOSIS — I13.0: ICD-10-CM

## 2022-03-03 DIAGNOSIS — D72.829: ICD-10-CM

## 2022-03-03 DIAGNOSIS — N39.0: ICD-10-CM

## 2022-03-03 DIAGNOSIS — A41.51: Primary | ICD-10-CM

## 2022-03-03 DIAGNOSIS — Z95.0: ICD-10-CM

## 2022-03-03 DIAGNOSIS — L89.312: ICD-10-CM

## 2022-03-03 DIAGNOSIS — J69.0: ICD-10-CM

## 2022-03-03 DIAGNOSIS — G20: ICD-10-CM

## 2022-03-03 DIAGNOSIS — N17.9: ICD-10-CM

## 2022-03-03 DIAGNOSIS — R00.0: ICD-10-CM

## 2022-03-03 DIAGNOSIS — J44.0: ICD-10-CM

## 2022-03-03 DIAGNOSIS — E11.22: ICD-10-CM

## 2022-03-03 DIAGNOSIS — L89.154: ICD-10-CM

## 2022-03-03 DIAGNOSIS — Z22.322: ICD-10-CM

## 2022-03-03 DIAGNOSIS — E78.5: ICD-10-CM

## 2022-03-03 DIAGNOSIS — J44.9: ICD-10-CM

## 2022-03-03 DIAGNOSIS — D68.8: ICD-10-CM

## 2022-03-03 DIAGNOSIS — R32: ICD-10-CM

## 2022-03-03 DIAGNOSIS — N40.0: ICD-10-CM

## 2022-03-03 DIAGNOSIS — G93.41: ICD-10-CM

## 2022-03-03 DIAGNOSIS — N18.9: ICD-10-CM

## 2022-03-03 DIAGNOSIS — I25.10: ICD-10-CM

## 2022-03-03 DIAGNOSIS — E87.2: ICD-10-CM

## 2022-03-03 DIAGNOSIS — J98.11: ICD-10-CM

## 2022-03-03 DIAGNOSIS — I49.9: ICD-10-CM

## 2022-03-03 DIAGNOSIS — I50.23: ICD-10-CM

## 2022-03-03 DIAGNOSIS — Z95.5: ICD-10-CM

## 2022-03-03 DIAGNOSIS — D50.9: ICD-10-CM

## 2022-03-03 LAB
ALBUMIN SERPL-MCNC: 2.4 G/DL (ref 3.4–5)
ALP SERPL-CCNC: 191 U/L (ref 45–117)
ALT SERPL-CCNC: 10 U/L (ref 13–61)
ANION GAP SERPL CALC-SCNC: 7 MMOL/L (ref 8–16)
ANISOCYTOSIS BLD QL: (no result)
APPEARANCE UR: (no result)
APTT BLD: 44.6 SECONDS (ref 25.2–36.5)
AST SERPL-CCNC: 40 U/L (ref 15–37)
BACTERIA # UR AUTO: (no result) /UL (ref 0–1359)
BASE EXCESS BLDV CALC-SCNC: -2 MMOL/L (ref -2–2)
BILIRUB SERPL-MCNC: 0.9 MG/DL (ref 0.2–1)
BILIRUB UR STRIP.AUTO-MCNC: (no result) MG/DL
BNP SERPL-MCNC: (no result) PG/ML (ref 5–450)
BUN SERPL-MCNC: 58.6 MG/DL (ref 7–18)
CALCIUM SERPL-MCNC: 7.9 MG/DL (ref 8.5–10.1)
CASTS URNS QL MICRO: 21 /UL (ref 0–3.1)
CHLORIDE SERPL-SCNC: 110 MMOL/L (ref 98–107)
CO2 SERPL-SCNC: 25 MMOL/L (ref 21–32)
COLOR UR: (no result)
CREAT SERPL-MCNC: 2.5 MG/DL (ref 0.55–1.3)
DACRYOCYTES BLD QL SMEAR: (no result)
DEPRECATED RDW RBC AUTO: 18.5 % (ref 11.9–15.9)
EPITH CASTS URNS QL MICRO: >36 /UL (ref 0–25.1)
GLUCOSE SERPL-MCNC: 327 MG/DL (ref 74–106)
HCT VFR BLD CALC: 27.5 % (ref 35.4–49)
HCT VFR BLDV CALC: 28 % (ref 35.4–49)
HGB BLD-MCNC: 8.6 GM/DL (ref 11.7–16.9)
INR BLD: 4.5 (ref 0.83–1.09)
KETONES UR QL STRIP: (no result)
LACTATE SERPL-MCNC: 2.3 MMOL/L (ref 0.4–2)
LEUKOCYTE ESTERASE UR QL STRIP.AUTO: (no result)
MACROCYTES BLD QL: (no result)
MCH RBC QN AUTO: 25.7 PG (ref 25.7–33.7)
MCHC RBC AUTO-ENTMCNC: 31.1 G/DL (ref 32–35.9)
MCV RBC: 82.7 FL (ref 80–96)
NITRITE UR QL STRIP: POSITIVE
OVALOCYTES BLD QL SMEAR: (no result)
PCO2 BLDV: 47.8 MMHG (ref 38–52)
PH BLDV: 7.32 [PH] (ref 7.31–7.41)
PH UR: 5 [PH] (ref 5–8)
PLATELET # BLD AUTO: 203 10^3/UL (ref 134–434)
PMV BLD: 11.4 FL (ref 7.5–11.1)
PROT SERPL-MCNC: 5.7 G/DL (ref 6.4–8.2)
PROT UR QL STRIP: (no result)
PROT UR QL STRIP: NEGATIVE
PT PNL PPP: 52.5 SEC (ref 9.7–13)
RBC # BLD AUTO: 1451.9 /UL (ref 0–23.9)
RBC # BLD AUTO: 3.33 M/MM3 (ref 4–5.6)
SAO2 % BLDV: 92.2 % (ref 70–80)
SODIUM SERPL-SCNC: 143 MMOL/L (ref 136–145)
SP GR UR: 1.02 (ref 1.01–1.03)
TARGETS BLD QL SMEAR: (no result)
TOXIC GRANULES BLD QL SMEAR: (no result)
UROBILINOGEN UR STRIP-MCNC: 1 MG/DL (ref 0.2–1)
WBC # BLD AUTO: 22.3 K/MM3 (ref 4–10)
WBC # UR AUTO: (no result) /UL (ref 0–25.8)
YEAST BUDDING URNS QL: (no result)

## 2022-03-03 PROCEDURE — U0003 INFECTIOUS AGENT DETECTION BY NUCLEIC ACID (DNA OR RNA); SEVERE ACUTE RESPIRATORY SYNDROME CORONAVIRUS 2 (SARS-COV-2) (CORONAVIRUS DISEASE [COVID-19]), AMPLIFIED PROBE TECHNIQUE, MAKING USE OF HIGH THROUGHPUT TECHNOLOGIES AS DESCRIBED BY CMS-2020-01-R: HCPCS

## 2022-03-03 PROCEDURE — U0005 INFEC AGEN DETEC AMPLI PROBE: HCPCS

## 2022-03-04 LAB — LACTATE SERPL-MCNC: 2.1 MMOL/L (ref 0.4–2)

## 2022-03-04 RX ADMIN — PIPERACILLIN SODIUM,TAZOBACTAM SODIUM SCH: 3; .375 INJECTION, POWDER, FOR SOLUTION INTRAVENOUS at 23:17

## 2022-03-04 RX ADMIN — MEROPENEM SCH MLS/HR: 1 INJECTION, POWDER, FOR SOLUTION INTRAVENOUS at 23:31

## 2022-03-04 RX ADMIN — PIPERACILLIN SODIUM,TAZOBACTAM SODIUM SCH MLS/HR: 3; .375 INJECTION, POWDER, FOR SOLUTION INTRAVENOUS at 04:13

## 2022-03-04 RX ADMIN — MEROPENEM SCH MLS/HR: 1 INJECTION, POWDER, FOR SOLUTION INTRAVENOUS at 14:24

## 2022-03-05 LAB
ALBUMIN SERPL-MCNC: 2.3 G/DL (ref 3.4–5)
ALP SERPL-CCNC: 184 U/L (ref 45–117)
ALT SERPL-CCNC: 11 U/L (ref 13–61)
ANION GAP SERPL CALC-SCNC: 10 MMOL/L (ref 8–16)
ANISOCYTOSIS BLD QL: 0
AST SERPL-CCNC: 23 U/L (ref 15–37)
BASO STIPL BLD QL SMEAR: 0
BILIRUB SERPL-MCNC: 0.8 MG/DL (ref 0.2–1)
BUN SERPL-MCNC: 66.8 MG/DL (ref 7–18)
CALCIUM SERPL-MCNC: 8 MG/DL (ref 8.5–10.1)
CHLORIDE SERPL-SCNC: 105 MMOL/L (ref 98–107)
CHOLEST SERPL-MCNC: 70 MG/DL (ref 50–200)
CO2 SERPL-SCNC: 26 MMOL/L (ref 21–32)
CREAT SERPL-MCNC: 2.6 MG/DL (ref 0.55–1.3)
DACRYOCYTES BLD QL SMEAR: 0
DEPRECATED RDW RBC AUTO: 18.5 % (ref 11.9–15.9)
DOHLE BOD BLD QL SMEAR: 0
GLUCOSE SERPL-MCNC: 294 MG/DL (ref 74–106)
HCT VFR BLD CALC: 26.4 % (ref 35.4–49)
HDLC SERPL-MCNC: 21 MG/DL (ref 40–60)
HELMET CELLS BLD QL SMEAR: 0
HGB BLD-MCNC: 8.2 GM/DL (ref 11.7–16.9)
HOWELL-JOLLY BOD BLD QL SMEAR: 0
INR BLD: 2.9 (ref 0.83–1.09)
LDLC SERPL CALC-MCNC: 22 MG/DL (ref 5–100)
MACROCYTES BLD QL: 0
MCH RBC QN AUTO: 25.3 PG (ref 25.7–33.7)
MCHC RBC AUTO-ENTMCNC: 31.1 G/DL (ref 32–35.9)
MCV RBC: 81.4 FL (ref 80–96)
OVALOCYTES BLD QL SMEAR: 0
PLATELET # BLD AUTO: 191 10^3/UL (ref 134–434)
PMV BLD: 11.3 FL (ref 7.5–11.1)
PROT SERPL-MCNC: 5.6 G/DL (ref 6.4–8.2)
PT PNL PPP: 33.7 SEC (ref 9.7–13)
RBC # BLD AUTO: 3.24 M/MM3 (ref 4–5.6)
ROULEAUX BLD QL SMEAR: 0
SICKLE CELLS BLD QL SMEAR: 0
SODIUM SERPL-SCNC: 141 MMOL/L (ref 136–145)
TARGETS BLD QL SMEAR: 0
TOXIC GRANULES BLD QL SMEAR: 0
TRIGL SERPL-MCNC: 101 MG/DL (ref 0–150)
WBC # BLD AUTO: 16.5 K/MM3 (ref 4–10)

## 2022-03-05 RX ADMIN — QUETIAPINE FUMARATE SCH MG: 100 TABLET ORAL at 21:59

## 2022-03-05 RX ADMIN — FUROSEMIDE SCH MG: 10 INJECTION, SOLUTION INTRAVENOUS at 12:25

## 2022-03-05 RX ADMIN — CARVEDILOL SCH MG: 3.12 TABLET, FILM COATED ORAL at 21:58

## 2022-03-05 RX ADMIN — POLYETHYLENE GLYCOL 3350 SCH GM: 17 POWDER, FOR SOLUTION ORAL at 22:04

## 2022-03-05 RX ADMIN — CARBIDOPA AND LEVODOPA SCH EACH: 25; 250 TABLET ORAL at 18:18

## 2022-03-05 RX ADMIN — CARBIDOPA AND LEVODOPA SCH EACH: 25; 250 TABLET ORAL at 14:13

## 2022-03-05 RX ADMIN — MIDODRINE HYDROCHLORIDE SCH MG: 5 TABLET ORAL at 18:19

## 2022-03-05 RX ADMIN — MEROPENEM SCH MLS/HR: 1 INJECTION, POWDER, FOR SOLUTION INTRAVENOUS at 09:56

## 2022-03-05 RX ADMIN — MIDODRINE HYDROCHLORIDE SCH MG: 5 TABLET ORAL at 14:15

## 2022-03-05 RX ADMIN — MEROPENEM SCH MLS/HR: 1 INJECTION, POWDER, FOR SOLUTION INTRAVENOUS at 21:58

## 2022-03-05 RX ADMIN — CARVEDILOL SCH MG: 3.12 TABLET, FILM COATED ORAL at 09:55

## 2022-03-05 RX ADMIN — INSULIN ASPART SCH UNITS: 100 INJECTION, SOLUTION INTRAVENOUS; SUBCUTANEOUS at 22:45

## 2022-03-05 RX ADMIN — ESCITALOPRAM OXALATE SCH MG: 10 TABLET, FILM COATED ORAL at 09:55

## 2022-03-05 RX ADMIN — CARBIDOPA AND LEVODOPA SCH EACH: 25; 250 TABLET ORAL at 11:02

## 2022-03-05 RX ADMIN — ATORVASTATIN CALCIUM SCH MG: 20 TABLET, FILM COATED ORAL at 21:58

## 2022-03-05 RX ADMIN — ALLOPURINOL SCH MG: 300 TABLET ORAL at 11:03

## 2022-03-06 LAB
ALBUMIN SERPL-MCNC: 2.2 G/DL (ref 3.4–5)
ALP SERPL-CCNC: 149 U/L (ref 45–117)
ALT SERPL-CCNC: 7 U/L (ref 13–61)
ANION GAP SERPL CALC-SCNC: 7 MMOL/L (ref 8–16)
AST SERPL-CCNC: 13 U/L (ref 15–37)
BASOPHILS # BLD: 0.2 % (ref 0–2)
BILIRUB SERPL-MCNC: 0.6 MG/DL (ref 0.2–1)
BUN SERPL-MCNC: 82 MG/DL (ref 7–18)
CALCIUM SERPL-MCNC: 8.1 MG/DL (ref 8.5–10.1)
CHLORIDE SERPL-SCNC: 109 MMOL/L (ref 98–107)
CO2 SERPL-SCNC: 27 MMOL/L (ref 21–32)
CREAT SERPL-MCNC: 2.7 MG/DL (ref 0.55–1.3)
DEPRECATED RDW RBC AUTO: 18.5 % (ref 11.9–15.9)
EOSINOPHIL # BLD: 1.3 % (ref 0–4.5)
GLUCOSE SERPL-MCNC: 206 MG/DL (ref 74–106)
HCT VFR BLD CALC: 25.7 % (ref 35.4–49)
HGB BLD-MCNC: 8 GM/DL (ref 11.7–16.9)
LYMPHOCYTES # BLD: 9.6 % (ref 8–40)
MCH RBC QN AUTO: 25.4 PG (ref 25.7–33.7)
MCHC RBC AUTO-ENTMCNC: 31 G/DL (ref 32–35.9)
MCV RBC: 81.9 FL (ref 80–96)
MONOCYTES # BLD AUTO: 8.6 % (ref 3.8–10.2)
NEUTROPHILS # BLD: 80.3 % (ref 42.8–82.8)
PLATELET # BLD AUTO: 170 10^3/UL (ref 134–434)
PMV BLD: 11.6 FL (ref 7.5–11.1)
PROT SERPL-MCNC: 5.2 G/DL (ref 6.4–8.2)
RBC # BLD AUTO: 3.14 M/MM3 (ref 4–5.6)
SODIUM SERPL-SCNC: 142 MMOL/L (ref 136–145)
WBC # BLD AUTO: 12.3 K/MM3 (ref 4–10)

## 2022-03-06 RX ADMIN — MIDODRINE HYDROCHLORIDE SCH MG: 5 TABLET ORAL at 13:20

## 2022-03-06 RX ADMIN — Medication SCH: at 23:03

## 2022-03-06 RX ADMIN — Medication SCH MG: at 09:57

## 2022-03-06 RX ADMIN — CARBIDOPA AND LEVODOPA SCH EACH: 25; 250 TABLET ORAL at 06:18

## 2022-03-06 RX ADMIN — CEFTRIAXONE SODIUM SCH MLS/HR: 2 INJECTION, POWDER, FOR SOLUTION INTRAMUSCULAR; INTRAVENOUS at 11:39

## 2022-03-06 RX ADMIN — CARVEDILOL SCH MG: 3.12 TABLET, FILM COATED ORAL at 09:43

## 2022-03-06 RX ADMIN — ATORVASTATIN CALCIUM SCH MG: 20 TABLET, FILM COATED ORAL at 22:05

## 2022-03-06 RX ADMIN — INSULIN ASPART SCH UNITS: 100 INJECTION, SOLUTION INTRAVENOUS; SUBCUTANEOUS at 06:18

## 2022-03-06 RX ADMIN — MIDODRINE HYDROCHLORIDE SCH MG: 5 TABLET ORAL at 09:43

## 2022-03-06 RX ADMIN — MIDODRINE HYDROCHLORIDE SCH MG: 5 TABLET ORAL at 17:56

## 2022-03-06 RX ADMIN — INSULIN ASPART SCH UNITS: 100 INJECTION, SOLUTION INTRAVENOUS; SUBCUTANEOUS at 22:20

## 2022-03-06 RX ADMIN — POLYETHYLENE GLYCOL 3350 SCH: 17 POWDER, FOR SOLUTION ORAL at 22:05

## 2022-03-06 RX ADMIN — INSULIN ASPART SCH UNITS: 100 INJECTION, SOLUTION INTRAVENOUS; SUBCUTANEOUS at 11:33

## 2022-03-06 RX ADMIN — CARBIDOPA AND LEVODOPA SCH EACH: 25; 250 TABLET ORAL at 17:56

## 2022-03-06 RX ADMIN — FUROSEMIDE SCH MG: 10 INJECTION, SOLUTION INTRAVENOUS at 09:58

## 2022-03-06 RX ADMIN — CARBIDOPA AND LEVODOPA SCH EACH: 25; 250 TABLET ORAL at 09:56

## 2022-03-06 RX ADMIN — INSULIN ASPART SCH UNITS: 100 INJECTION, SOLUTION INTRAVENOUS; SUBCUTANEOUS at 16:26

## 2022-03-06 RX ADMIN — QUETIAPINE FUMARATE SCH MG: 100 TABLET ORAL at 22:05

## 2022-03-06 RX ADMIN — POLYETHYLENE GLYCOL 3350 SCH: 17 POWDER, FOR SOLUTION ORAL at 09:58

## 2022-03-06 RX ADMIN — CARBIDOPA AND LEVODOPA SCH EACH: 25; 250 TABLET ORAL at 13:21

## 2022-03-06 RX ADMIN — ALLOPURINOL SCH MG: 300 TABLET ORAL at 09:56

## 2022-03-06 RX ADMIN — ESCITALOPRAM OXALATE SCH MG: 10 TABLET, FILM COATED ORAL at 09:45

## 2022-03-07 LAB
ALBUMIN SERPL-MCNC: 2.2 G/DL (ref 3.4–5)
ALP SERPL-CCNC: 152 U/L (ref 45–117)
ALT SERPL-CCNC: 6 U/L (ref 13–61)
ANION GAP SERPL CALC-SCNC: 9 MMOL/L (ref 8–16)
AST SERPL-CCNC: 14 U/L (ref 15–37)
BASOPHILS # BLD: 0.5 % (ref 0–2)
BILIRUB SERPL-MCNC: 0.7 MG/DL (ref 0.2–1)
BUN SERPL-MCNC: 82.3 MG/DL (ref 7–18)
CALCIUM SERPL-MCNC: 8 MG/DL (ref 8.5–10.1)
CHLORIDE SERPL-SCNC: 107 MMOL/L (ref 98–107)
CO2 SERPL-SCNC: 28 MMOL/L (ref 21–32)
CREAT SERPL-MCNC: 2.5 MG/DL (ref 0.55–1.3)
DEPRECATED RDW RBC AUTO: 18.5 % (ref 11.9–15.9)
EOSINOPHIL # BLD: 3.5 % (ref 0–4.5)
GLUCOSE SERPL-MCNC: 133 MG/DL (ref 74–106)
HCT VFR BLD CALC: 26.1 % (ref 35.4–49)
HGB BLD-MCNC: 8.1 GM/DL (ref 11.7–16.9)
IRON SERPL-MCNC: 43 UG/DL (ref 50–175)
LYMPHOCYTES # BLD: 9.5 % (ref 8–40)
MCH RBC QN AUTO: 25.4 PG (ref 25.7–33.7)
MCHC RBC AUTO-ENTMCNC: 31.1 G/DL (ref 32–35.9)
MCV RBC: 81.7 FL (ref 80–96)
MONOCYTES # BLD AUTO: 10.6 % (ref 3.8–10.2)
NEUTROPHILS # BLD: 75.9 % (ref 42.8–82.8)
PLATELET # BLD AUTO: 183 10^3/UL (ref 134–434)
PMV BLD: 11.6 FL (ref 7.5–11.1)
PROT SERPL-MCNC: 5.3 G/DL (ref 6.4–8.2)
RBC # BLD AUTO: 3.19 M/MM3 (ref 4–5.6)
SODIUM SERPL-SCNC: 144 MMOL/L (ref 136–145)
TIBC SERPL-MCNC: 162 UG/DL (ref 250–450)
WBC # BLD AUTO: 10.5 K/MM3 (ref 4–10)

## 2022-03-07 RX ADMIN — INSULIN ASPART SCH UNITS: 100 INJECTION, SOLUTION INTRAVENOUS; SUBCUTANEOUS at 10:43

## 2022-03-07 RX ADMIN — CARBIDOPA AND LEVODOPA SCH EACH: 25; 250 TABLET ORAL at 17:09

## 2022-03-07 RX ADMIN — CARBIDOPA AND LEVODOPA SCH EACH: 25; 250 TABLET ORAL at 13:32

## 2022-03-07 RX ADMIN — POLYETHYLENE GLYCOL 3350 SCH GM: 17 POWDER, FOR SOLUTION ORAL at 09:54

## 2022-03-07 RX ADMIN — MIDODRINE HYDROCHLORIDE SCH MG: 5 TABLET ORAL at 17:09

## 2022-03-07 RX ADMIN — ATORVASTATIN CALCIUM SCH MG: 20 TABLET, FILM COATED ORAL at 21:44

## 2022-03-07 RX ADMIN — Medication SCH MG: at 09:54

## 2022-03-07 RX ADMIN — CARBIDOPA AND LEVODOPA SCH EACH: 25; 250 TABLET ORAL at 09:55

## 2022-03-07 RX ADMIN — Medication SCH MG: at 09:55

## 2022-03-07 RX ADMIN — QUETIAPINE FUMARATE SCH MG: 100 TABLET ORAL at 21:44

## 2022-03-07 RX ADMIN — MIDODRINE HYDROCHLORIDE SCH MG: 5 TABLET ORAL at 13:32

## 2022-03-07 RX ADMIN — MIDODRINE HYDROCHLORIDE SCH MG: 5 TABLET ORAL at 09:54

## 2022-03-07 RX ADMIN — CARBIDOPA AND LEVODOPA SCH EACH: 25; 250 TABLET ORAL at 06:36

## 2022-03-07 RX ADMIN — ESCITALOPRAM OXALATE SCH MG: 10 TABLET, FILM COATED ORAL at 09:54

## 2022-03-07 RX ADMIN — CEFTRIAXONE SODIUM SCH MLS/HR: 2 INJECTION, POWDER, FOR SOLUTION INTRAMUSCULAR; INTRAVENOUS at 09:54

## 2022-03-07 RX ADMIN — POLYETHYLENE GLYCOL 3350 SCH GM: 17 POWDER, FOR SOLUTION ORAL at 21:44

## 2022-03-07 RX ADMIN — INSULIN ASPART SCH: 100 INJECTION, SOLUTION INTRAVENOUS; SUBCUTANEOUS at 06:38

## 2022-03-07 RX ADMIN — ALLOPURINOL SCH MG: 300 TABLET ORAL at 10:01

## 2022-03-07 RX ADMIN — INSULIN ASPART SCH: 100 INJECTION, SOLUTION INTRAVENOUS; SUBCUTANEOUS at 21:52

## 2022-03-07 RX ADMIN — INSULIN ASPART SCH UNITS: 100 INJECTION, SOLUTION INTRAVENOUS; SUBCUTANEOUS at 17:08

## 2022-03-07 RX ADMIN — Medication SCH EACH: at 09:57

## 2022-03-08 LAB
ALBUMIN SERPL-MCNC: 2.3 G/DL (ref 3.4–5)
ALP SERPL-CCNC: 186 U/L (ref 45–117)
ALT SERPL-CCNC: 7 U/L (ref 13–61)
ANION GAP SERPL CALC-SCNC: 5 MMOL/L (ref 8–16)
AST SERPL-CCNC: 19 U/L (ref 15–37)
BILIRUB SERPL-MCNC: 0.7 MG/DL (ref 0.2–1)
BUN SERPL-MCNC: 80.2 MG/DL (ref 7–18)
CALCIUM SERPL-MCNC: 8.2 MG/DL (ref 8.5–10.1)
CHLORIDE SERPL-SCNC: 108 MMOL/L (ref 98–107)
CO2 SERPL-SCNC: 29 MMOL/L (ref 21–32)
CREAT SERPL-MCNC: 2.2 MG/DL (ref 0.55–1.3)
DEPRECATED RDW RBC AUTO: 18.8 % (ref 11.9–15.9)
GLUCOSE SERPL-MCNC: 136 MG/DL (ref 74–106)
HCT VFR BLD CALC: 27.2 % (ref 35.4–49)
HGB BLD-MCNC: 8.8 GM/DL (ref 11.7–16.9)
INR BLD: 1.79 (ref 0.83–1.09)
MCH RBC QN AUTO: 26.1 PG (ref 25.7–33.7)
MCHC RBC AUTO-ENTMCNC: 32.6 G/DL (ref 32–35.9)
MCV RBC: 80.2 FL (ref 80–96)
PLATELET # BLD AUTO: 169 10^3/UL (ref 134–434)
PMV BLD: 10.9 FL (ref 7.5–11.1)
PROT SERPL-MCNC: 5.8 G/DL (ref 6.4–8.2)
PT PNL PPP: 20.7 SEC (ref 9.7–13)
RBC # BLD AUTO: 3.39 M/MM3 (ref 4–5.6)
SODIUM SERPL-SCNC: 142 MMOL/L (ref 136–145)
WBC # BLD AUTO: 10.2 K/MM3 (ref 4–10)

## 2022-03-08 RX ADMIN — ESCITALOPRAM OXALATE SCH MG: 10 TABLET, FILM COATED ORAL at 10:31

## 2022-03-08 RX ADMIN — INSULIN ASPART SCH UNITS: 100 INJECTION, SOLUTION INTRAVENOUS; SUBCUTANEOUS at 11:59

## 2022-03-08 RX ADMIN — Medication SCH EACH: at 10:32

## 2022-03-08 RX ADMIN — MIDODRINE HYDROCHLORIDE SCH MG: 5 TABLET ORAL at 15:35

## 2022-03-08 RX ADMIN — MIDODRINE HYDROCHLORIDE SCH MG: 5 TABLET ORAL at 18:43

## 2022-03-08 RX ADMIN — INSULIN ASPART SCH UNITS: 100 INJECTION, SOLUTION INTRAVENOUS; SUBCUTANEOUS at 22:07

## 2022-03-08 RX ADMIN — CARBIDOPA AND LEVODOPA SCH EACH: 25; 250 TABLET ORAL at 15:33

## 2022-03-08 RX ADMIN — CEFTRIAXONE SODIUM SCH MLS/HR: 2 INJECTION, POWDER, FOR SOLUTION INTRAMUSCULAR; INTRAVENOUS at 10:33

## 2022-03-08 RX ADMIN — INSULIN ASPART SCH: 100 INJECTION, SOLUTION INTRAVENOUS; SUBCUTANEOUS at 06:19

## 2022-03-08 RX ADMIN — MIDODRINE HYDROCHLORIDE SCH MG: 5 TABLET ORAL at 10:32

## 2022-03-08 RX ADMIN — POLYETHYLENE GLYCOL 3350 SCH: 17 POWDER, FOR SOLUTION ORAL at 22:01

## 2022-03-08 RX ADMIN — INSULIN ASPART SCH UNITS: 100 INJECTION, SOLUTION INTRAVENOUS; SUBCUTANEOUS at 17:20

## 2022-03-08 RX ADMIN — CARBIDOPA AND LEVODOPA SCH EACH: 25; 250 TABLET ORAL at 10:30

## 2022-03-08 RX ADMIN — QUETIAPINE FUMARATE SCH MG: 100 TABLET ORAL at 22:03

## 2022-03-08 RX ADMIN — CARBIDOPA AND LEVODOPA SCH EACH: 25; 250 TABLET ORAL at 18:43

## 2022-03-08 RX ADMIN — POLYETHYLENE GLYCOL 3350 SCH GM: 17 POWDER, FOR SOLUTION ORAL at 10:28

## 2022-03-08 RX ADMIN — Medication SCH MG: at 10:29

## 2022-03-08 RX ADMIN — CARBIDOPA AND LEVODOPA SCH EACH: 25; 250 TABLET ORAL at 06:12

## 2022-03-08 RX ADMIN — ATORVASTATIN CALCIUM SCH MG: 20 TABLET, FILM COATED ORAL at 22:03

## 2022-03-08 RX ADMIN — ALLOPURINOL SCH MG: 300 TABLET ORAL at 10:30

## 2022-03-08 RX ADMIN — Medication SCH MG: at 10:30

## 2022-03-09 RX ADMIN — ATORVASTATIN CALCIUM SCH MG: 20 TABLET, FILM COATED ORAL at 21:41

## 2022-03-09 RX ADMIN — CARBIDOPA AND LEVODOPA SCH EACH: 25; 250 TABLET ORAL at 14:57

## 2022-03-09 RX ADMIN — CARBIDOPA AND LEVODOPA SCH EACH: 25; 250 TABLET ORAL at 06:00

## 2022-03-09 RX ADMIN — CARBIDOPA AND LEVODOPA SCH EACH: 25; 250 TABLET ORAL at 19:35

## 2022-03-09 RX ADMIN — INSULIN ASPART SCH: 100 INJECTION, SOLUTION INTRAVENOUS; SUBCUTANEOUS at 21:41

## 2022-03-09 RX ADMIN — MIDODRINE HYDROCHLORIDE SCH MG: 5 TABLET ORAL at 09:00

## 2022-03-09 RX ADMIN — Medication SCH MG: at 09:01

## 2022-03-09 RX ADMIN — PANTOPRAZOLE SODIUM SCH: 40 INJECTION, POWDER, FOR SOLUTION INTRAVENOUS at 07:56

## 2022-03-09 RX ADMIN — ALLOPURINOL SCH MG: 300 TABLET ORAL at 09:02

## 2022-03-09 RX ADMIN — INSULIN ASPART SCH UNITS: 100 INJECTION, SOLUTION INTRAVENOUS; SUBCUTANEOUS at 12:30

## 2022-03-09 RX ADMIN — CARBIDOPA AND LEVODOPA SCH EACH: 25; 250 TABLET ORAL at 09:00

## 2022-03-09 RX ADMIN — ESCITALOPRAM OXALATE SCH MG: 10 TABLET, FILM COATED ORAL at 09:00

## 2022-03-09 RX ADMIN — CEFTRIAXONE SODIUM SCH MLS/HR: 2 INJECTION, POWDER, FOR SOLUTION INTRAMUSCULAR; INTRAVENOUS at 09:01

## 2022-03-09 RX ADMIN — MIDODRINE HYDROCHLORIDE SCH MG: 5 TABLET ORAL at 14:57

## 2022-03-09 RX ADMIN — Medication SCH: at 09:05

## 2022-03-09 RX ADMIN — INSULIN ASPART SCH: 100 INJECTION, SOLUTION INTRAVENOUS; SUBCUTANEOUS at 06:00

## 2022-03-09 RX ADMIN — INSULIN ASPART SCH UNITS: 100 INJECTION, SOLUTION INTRAVENOUS; SUBCUTANEOUS at 18:38

## 2022-03-09 RX ADMIN — MIDODRINE HYDROCHLORIDE SCH MG: 5 TABLET ORAL at 19:35

## 2022-03-09 RX ADMIN — QUETIAPINE FUMARATE SCH MG: 100 TABLET ORAL at 21:42

## 2022-03-09 RX ADMIN — RIVAROXABAN SCH MG: 10 TABLET, FILM COATED ORAL at 14:56

## 2022-03-09 RX ADMIN — SCOPALAMINE SCH PATCH: 1 PATCH, EXTENDED RELEASE TRANSDERMAL at 09:40

## 2022-03-09 RX ADMIN — PANTOPRAZOLE SODIUM SCH MG: 40 TABLET, DELAYED RELEASE ORAL at 09:01

## 2022-03-09 RX ADMIN — Medication SCH EACH: at 09:00

## 2022-03-09 RX ADMIN — POLYETHYLENE GLYCOL 3350 SCH: 17 POWDER, FOR SOLUTION ORAL at 09:01

## 2022-03-09 RX ADMIN — PANTOPRAZOLE SODIUM SCH: 40 TABLET, DELAYED RELEASE ORAL at 07:56

## 2022-03-09 RX ADMIN — SENNOSIDES SCH MG: 8.8 SYRUP ORAL at 21:42

## 2022-03-10 LAB
ALBUMIN SERPL-MCNC: 2.4 G/DL (ref 3.4–5)
ALP SERPL-CCNC: 229 U/L (ref 45–117)
ALT SERPL-CCNC: 10 U/L (ref 13–61)
ANION GAP SERPL CALC-SCNC: 6 MMOL/L (ref 8–16)
AST SERPL-CCNC: 46 U/L (ref 15–37)
BILIRUB SERPL-MCNC: 0.6 MG/DL (ref 0.2–1)
BUN SERPL-MCNC: 67.6 MG/DL (ref 7–18)
CALCIUM SERPL-MCNC: 8.3 MG/DL (ref 8.5–10.1)
CHLORIDE SERPL-SCNC: 107 MMOL/L (ref 98–107)
CO2 SERPL-SCNC: 31 MMOL/L (ref 21–32)
CREAT SERPL-MCNC: 1.8 MG/DL (ref 0.55–1.3)
GLUCOSE SERPL-MCNC: 197 MG/DL (ref 74–106)
PROT SERPL-MCNC: 5.9 G/DL (ref 6.4–8.2)
SODIUM SERPL-SCNC: 144 MMOL/L (ref 136–145)

## 2022-03-10 RX ADMIN — SENNOSIDES SCH MG: 8.8 SYRUP ORAL at 22:23

## 2022-03-10 RX ADMIN — Medication SCH MG: at 09:24

## 2022-03-10 RX ADMIN — CEFTRIAXONE SODIUM SCH MLS/HR: 2 INJECTION, POWDER, FOR SOLUTION INTRAMUSCULAR; INTRAVENOUS at 09:21

## 2022-03-10 RX ADMIN — INSULIN ASPART SCH UNITS: 100 INJECTION, SOLUTION INTRAVENOUS; SUBCUTANEOUS at 12:08

## 2022-03-10 RX ADMIN — CARBIDOPA AND LEVODOPA SCH EACH: 25; 250 TABLET ORAL at 05:56

## 2022-03-10 RX ADMIN — Medication SCH EACH: at 09:27

## 2022-03-10 RX ADMIN — MIDODRINE HYDROCHLORIDE SCH MG: 5 TABLET ORAL at 17:22

## 2022-03-10 RX ADMIN — ESCITALOPRAM OXALATE SCH MG: 10 TABLET, FILM COATED ORAL at 09:20

## 2022-03-10 RX ADMIN — ALLOPURINOL SCH MG: 300 TABLET ORAL at 09:28

## 2022-03-10 RX ADMIN — Medication SCH MG: at 09:21

## 2022-03-10 RX ADMIN — INSULIN ASPART SCH UNITS: 100 INJECTION, SOLUTION INTRAVENOUS; SUBCUTANEOUS at 17:21

## 2022-03-10 RX ADMIN — CARBIDOPA AND LEVODOPA SCH EACH: 25; 250 TABLET ORAL at 09:26

## 2022-03-10 RX ADMIN — QUETIAPINE FUMARATE SCH MG: 100 TABLET ORAL at 22:23

## 2022-03-10 RX ADMIN — Medication SCH ML: at 17:21

## 2022-03-10 RX ADMIN — INSULIN ASPART SCH UNITS: 100 INJECTION, SOLUTION INTRAVENOUS; SUBCUTANEOUS at 06:16

## 2022-03-10 RX ADMIN — PANTOPRAZOLE SODIUM SCH MG: 40 TABLET, DELAYED RELEASE ORAL at 09:20

## 2022-03-10 RX ADMIN — CARBIDOPA AND LEVODOPA SCH EACH: 25; 250 TABLET ORAL at 14:00

## 2022-03-10 RX ADMIN — INSULIN ASPART SCH: 100 INJECTION, SOLUTION INTRAVENOUS; SUBCUTANEOUS at 22:24

## 2022-03-10 RX ADMIN — CARBIDOPA AND LEVODOPA SCH EACH: 25; 250 TABLET ORAL at 17:22

## 2022-03-10 RX ADMIN — MIDODRINE HYDROCHLORIDE SCH MG: 5 TABLET ORAL at 14:00

## 2022-03-10 RX ADMIN — RIVAROXABAN SCH MG: 10 TABLET, FILM COATED ORAL at 09:28

## 2022-03-10 RX ADMIN — ATORVASTATIN CALCIUM SCH MG: 20 TABLET, FILM COATED ORAL at 22:23

## 2022-03-10 RX ADMIN — MIDODRINE HYDROCHLORIDE SCH MG: 5 TABLET ORAL at 09:20

## 2022-03-11 LAB
ALBUMIN SERPL-MCNC: 2.3 G/DL (ref 3.4–5)
ALP SERPL-CCNC: 187 U/L (ref 45–117)
ALT SERPL-CCNC: 9 U/L (ref 13–61)
ANION GAP SERPL CALC-SCNC: 4 MMOL/L (ref 8–16)
AST SERPL-CCNC: 26 U/L (ref 15–37)
BILIRUB SERPL-MCNC: 1 MG/DL (ref 0.2–1)
BUN SERPL-MCNC: 61.9 MG/DL (ref 7–18)
CALCIUM SERPL-MCNC: 8.5 MG/DL (ref 8.5–10.1)
CHLORIDE SERPL-SCNC: 109 MMOL/L (ref 98–107)
CO2 SERPL-SCNC: 32 MMOL/L (ref 21–32)
CREAT SERPL-MCNC: 1.6 MG/DL (ref 0.55–1.3)
DEPRECATED RDW RBC AUTO: 18.9 % (ref 11.9–15.9)
GLUCOSE SERPL-MCNC: 97 MG/DL (ref 74–106)
HCT VFR BLD CALC: 26.3 % (ref 35.4–49)
HGB BLD-MCNC: 8.1 GM/DL (ref 11.7–16.9)
MCH RBC QN AUTO: 24.9 PG (ref 25.7–33.7)
MCHC RBC AUTO-ENTMCNC: 30.6 G/DL (ref 32–35.9)
MCV RBC: 81.4 FL (ref 80–96)
PLATELET # BLD AUTO: 198 10^3/UL (ref 134–434)
PMV BLD: 11.1 FL (ref 7.5–11.1)
PROT SERPL-MCNC: 5.3 G/DL (ref 6.4–8.2)
RBC # BLD AUTO: 3.24 M/MM3 (ref 4–5.6)
SODIUM SERPL-SCNC: 145 MMOL/L (ref 136–145)
WBC # BLD AUTO: 11.8 K/MM3 (ref 4–10)

## 2022-03-11 RX ADMIN — Medication SCH ML: at 17:44

## 2022-03-11 RX ADMIN — INSULIN ASPART SCH UNITS: 100 INJECTION, SOLUTION INTRAVENOUS; SUBCUTANEOUS at 22:09

## 2022-03-11 RX ADMIN — MIDODRINE HYDROCHLORIDE SCH MG: 5 TABLET ORAL at 17:44

## 2022-03-11 RX ADMIN — RIVAROXABAN SCH MG: 10 TABLET, FILM COATED ORAL at 11:14

## 2022-03-11 RX ADMIN — ATORVASTATIN CALCIUM SCH MG: 20 TABLET, FILM COATED ORAL at 22:08

## 2022-03-11 RX ADMIN — Medication SCH MG: at 11:10

## 2022-03-11 RX ADMIN — INSULIN ASPART SCH: 100 INJECTION, SOLUTION INTRAVENOUS; SUBCUTANEOUS at 06:21

## 2022-03-11 RX ADMIN — ALLOPURINOL SCH MG: 300 TABLET ORAL at 11:14

## 2022-03-11 RX ADMIN — CARBIDOPA AND LEVODOPA SCH EACH: 25; 250 TABLET ORAL at 15:57

## 2022-03-11 RX ADMIN — PANTOPRAZOLE SODIUM SCH MG: 40 TABLET, DELAYED RELEASE ORAL at 11:09

## 2022-03-11 RX ADMIN — Medication SCH ML: at 11:07

## 2022-03-11 RX ADMIN — CEFTRIAXONE SODIUM SCH MLS/HR: 2 INJECTION, POWDER, FOR SOLUTION INTRAMUSCULAR; INTRAVENOUS at 11:09

## 2022-03-11 RX ADMIN — FUROSEMIDE SCH MG: 40 TABLET ORAL at 15:57

## 2022-03-11 RX ADMIN — SENNOSIDES SCH MG: 8.8 SYRUP ORAL at 22:08

## 2022-03-11 RX ADMIN — CARBIDOPA AND LEVODOPA SCH EACH: 25; 250 TABLET ORAL at 11:13

## 2022-03-11 RX ADMIN — INSULIN ASPART SCH UNITS: 100 INJECTION, SOLUTION INTRAVENOUS; SUBCUTANEOUS at 17:06

## 2022-03-11 RX ADMIN — Medication SCH MG: at 11:08

## 2022-03-11 RX ADMIN — CARBIDOPA AND LEVODOPA SCH EACH: 25; 250 TABLET ORAL at 17:44

## 2022-03-11 RX ADMIN — ESCITALOPRAM OXALATE SCH MG: 10 TABLET, FILM COATED ORAL at 11:07

## 2022-03-11 RX ADMIN — MIDODRINE HYDROCHLORIDE SCH MG: 5 TABLET ORAL at 11:09

## 2022-03-11 RX ADMIN — Medication SCH EACH: at 11:13

## 2022-03-11 RX ADMIN — CARBIDOPA AND LEVODOPA SCH EACH: 25; 250 TABLET ORAL at 06:18

## 2022-03-11 RX ADMIN — INSULIN ASPART SCH UNITS: 100 INJECTION, SOLUTION INTRAVENOUS; SUBCUTANEOUS at 11:28

## 2022-03-11 RX ADMIN — QUETIAPINE FUMARATE SCH MG: 100 TABLET ORAL at 22:08

## 2022-03-12 RX ADMIN — CARBIDOPA AND LEVODOPA SCH EACH: 25; 250 TABLET ORAL at 15:52

## 2022-03-12 RX ADMIN — ATORVASTATIN CALCIUM SCH MG: 20 TABLET, FILM COATED ORAL at 21:30

## 2022-03-12 RX ADMIN — INSULIN ASPART SCH UNITS: 100 INJECTION, SOLUTION INTRAVENOUS; SUBCUTANEOUS at 16:58

## 2022-03-12 RX ADMIN — CARBIDOPA AND LEVODOPA SCH EACH: 25; 250 TABLET ORAL at 18:47

## 2022-03-12 RX ADMIN — CARBIDOPA AND LEVODOPA SCH EACH: 25; 250 TABLET ORAL at 06:40

## 2022-03-12 RX ADMIN — PANTOPRAZOLE SODIUM SCH MG: 40 TABLET, DELAYED RELEASE ORAL at 12:07

## 2022-03-12 RX ADMIN — Medication SCH MG: at 12:07

## 2022-03-12 RX ADMIN — CARBIDOPA AND LEVODOPA SCH EACH: 25; 250 TABLET ORAL at 12:06

## 2022-03-12 RX ADMIN — SENNOSIDES SCH MG: 8.8 SYRUP ORAL at 21:30

## 2022-03-12 RX ADMIN — FUROSEMIDE SCH MG: 40 TABLET ORAL at 12:05

## 2022-03-12 RX ADMIN — MIDODRINE HYDROCHLORIDE SCH MG: 5 TABLET ORAL at 18:47

## 2022-03-12 RX ADMIN — CEFTRIAXONE SODIUM SCH MLS/HR: 2 INJECTION, POWDER, FOR SOLUTION INTRAMUSCULAR; INTRAVENOUS at 12:07

## 2022-03-12 RX ADMIN — SCOPALAMINE SCH PATCH: 1 PATCH, EXTENDED RELEASE TRANSDERMAL at 12:05

## 2022-03-12 RX ADMIN — ESCITALOPRAM OXALATE SCH MG: 10 TABLET, FILM COATED ORAL at 12:08

## 2022-03-12 RX ADMIN — Medication SCH MG: at 12:05

## 2022-03-12 RX ADMIN — ALLOPURINOL SCH MG: 300 TABLET ORAL at 12:08

## 2022-03-12 RX ADMIN — MIDODRINE HYDROCHLORIDE SCH MG: 5 TABLET ORAL at 12:06

## 2022-03-12 RX ADMIN — RIVAROXABAN SCH MG: 10 TABLET, FILM COATED ORAL at 12:07

## 2022-03-12 RX ADMIN — Medication SCH ML: at 12:08

## 2022-03-12 RX ADMIN — Medication SCH ML: at 18:47

## 2022-03-12 RX ADMIN — INSULIN ASPART SCH: 100 INJECTION, SOLUTION INTRAVENOUS; SUBCUTANEOUS at 06:01

## 2022-03-12 RX ADMIN — INSULIN ASPART SCH UNITS: 100 INJECTION, SOLUTION INTRAVENOUS; SUBCUTANEOUS at 12:18

## 2022-03-12 RX ADMIN — INSULIN ASPART SCH UNITS: 100 INJECTION, SOLUTION INTRAVENOUS; SUBCUTANEOUS at 21:31

## 2022-03-12 RX ADMIN — QUETIAPINE FUMARATE SCH MG: 100 TABLET ORAL at 21:30

## 2022-03-12 RX ADMIN — Medication SCH EACH: at 12:06

## 2022-03-13 LAB
ALBUMIN SERPL-MCNC: 2.4 G/DL (ref 3.4–5)
ALP SERPL-CCNC: 182 U/L (ref 45–117)
ALT SERPL-CCNC: 10 U/L (ref 13–61)
ANION GAP SERPL CALC-SCNC: 1 MMOL/L (ref 8–16)
AST SERPL-CCNC: 25 U/L (ref 15–37)
BASOPHILS # BLD: 0.9 % (ref 0–2)
BILIRUB SERPL-MCNC: 1.1 MG/DL (ref 0.2–1)
BUN SERPL-MCNC: 60.9 MG/DL (ref 7–18)
CALCIUM SERPL-MCNC: 8.5 MG/DL (ref 8.5–10.1)
CHLORIDE SERPL-SCNC: 107 MMOL/L (ref 98–107)
CO2 SERPL-SCNC: 35 MMOL/L (ref 21–32)
CREAT SERPL-MCNC: 1.6 MG/DL (ref 0.55–1.3)
DEPRECATED RDW RBC AUTO: 18.8 % (ref 11.9–15.9)
EOSINOPHIL # BLD: 2.2 % (ref 0–4.5)
GLUCOSE SERPL-MCNC: 258 MG/DL (ref 74–106)
HCT VFR BLD CALC: 25.6 % (ref 35.4–49)
HGB BLD-MCNC: 8 GM/DL (ref 11.7–16.9)
LYMPHOCYTES # BLD: 9.8 % (ref 8–40)
MCH RBC QN AUTO: 25.4 PG (ref 25.7–33.7)
MCHC RBC AUTO-ENTMCNC: 31.5 G/DL (ref 32–35.9)
MCV RBC: 80.7 FL (ref 80–96)
MONOCYTES # BLD AUTO: 6.5 % (ref 3.8–10.2)
NEUTROPHILS # BLD: 80.6 % (ref 42.8–82.8)
PLATELET # BLD AUTO: 221 10^3/UL (ref 134–434)
PMV BLD: 11.1 FL (ref 7.5–11.1)
PROT SERPL-MCNC: 5.6 G/DL (ref 6.4–8.2)
RBC # BLD AUTO: 3.17 M/MM3 (ref 4–5.6)
SODIUM SERPL-SCNC: 144 MMOL/L (ref 136–145)
WBC # BLD AUTO: 9.8 K/MM3 (ref 4–10)

## 2022-03-13 RX ADMIN — RIVAROXABAN SCH MG: 10 TABLET, FILM COATED ORAL at 10:36

## 2022-03-13 RX ADMIN — MIDODRINE HYDROCHLORIDE SCH MG: 5 TABLET ORAL at 18:13

## 2022-03-13 RX ADMIN — ATORVASTATIN CALCIUM SCH MG: 20 TABLET, FILM COATED ORAL at 21:48

## 2022-03-13 RX ADMIN — FUROSEMIDE SCH MG: 40 TABLET ORAL at 10:39

## 2022-03-13 RX ADMIN — SENNOSIDES SCH MG: 8.8 SYRUP ORAL at 21:48

## 2022-03-13 RX ADMIN — QUETIAPINE FUMARATE SCH MG: 100 TABLET ORAL at 21:49

## 2022-03-13 RX ADMIN — Medication SCH EACH: at 10:36

## 2022-03-13 RX ADMIN — CARBIDOPA AND LEVODOPA SCH EACH: 25; 250 TABLET ORAL at 06:43

## 2022-03-13 RX ADMIN — CARBIDOPA AND LEVODOPA SCH EACH: 25; 250 TABLET ORAL at 18:13

## 2022-03-13 RX ADMIN — INSULIN ASPART SCH UNITS: 100 INJECTION, SOLUTION INTRAVENOUS; SUBCUTANEOUS at 21:48

## 2022-03-13 RX ADMIN — ESCITALOPRAM OXALATE SCH MG: 10 TABLET, FILM COATED ORAL at 10:39

## 2022-03-13 RX ADMIN — CARBIDOPA AND LEVODOPA SCH EACH: 25; 250 TABLET ORAL at 14:43

## 2022-03-13 RX ADMIN — CEFTRIAXONE SODIUM SCH MLS/HR: 2 INJECTION, POWDER, FOR SOLUTION INTRAMUSCULAR; INTRAVENOUS at 10:35

## 2022-03-13 RX ADMIN — CARBIDOPA AND LEVODOPA SCH EACH: 25; 250 TABLET ORAL at 10:35

## 2022-03-13 RX ADMIN — MIDODRINE HYDROCHLORIDE SCH MG: 5 TABLET ORAL at 10:39

## 2022-03-13 RX ADMIN — Medication SCH ML: at 18:13

## 2022-03-13 RX ADMIN — ALLOPURINOL SCH MG: 300 TABLET ORAL at 10:40

## 2022-03-13 RX ADMIN — INSULIN ASPART SCH: 100 INJECTION, SOLUTION INTRAVENOUS; SUBCUTANEOUS at 06:29

## 2022-03-13 RX ADMIN — PANTOPRAZOLE SODIUM SCH MG: 40 TABLET, DELAYED RELEASE ORAL at 10:39

## 2022-03-13 RX ADMIN — INSULIN ASPART SCH UNITS: 100 INJECTION, SOLUTION INTRAVENOUS; SUBCUTANEOUS at 16:46

## 2022-03-13 RX ADMIN — INSULIN ASPART SCH UNITS: 100 INJECTION, SOLUTION INTRAVENOUS; SUBCUTANEOUS at 12:17

## 2022-03-13 RX ADMIN — Medication SCH MG: at 10:36

## 2022-03-13 RX ADMIN — Medication SCH MG: at 10:39

## 2022-03-13 RX ADMIN — Medication SCH ML: at 10:35

## 2022-03-14 LAB
ALBUMIN SERPL-MCNC: 2.4 G/DL (ref 3.4–5)
ALP SERPL-CCNC: 180 U/L (ref 45–117)
ALT SERPL-CCNC: 9 U/L (ref 13–61)
ANION GAP SERPL CALC-SCNC: 3 MMOL/L (ref 8–16)
AST SERPL-CCNC: 26 U/L (ref 15–37)
BASOPHILS # BLD: 0.7 % (ref 0–2)
BILIRUB SERPL-MCNC: 0.4 MG/DL (ref 0.2–1)
BUN SERPL-MCNC: 58.3 MG/DL (ref 7–18)
CALCIUM SERPL-MCNC: 8.6 MG/DL (ref 8.5–10.1)
CHLORIDE SERPL-SCNC: 108 MMOL/L (ref 98–107)
CO2 SERPL-SCNC: 32 MMOL/L (ref 21–32)
CREAT SERPL-MCNC: 1.5 MG/DL (ref 0.55–1.3)
DEPRECATED RDW RBC AUTO: 19.3 % (ref 11.9–15.9)
EOSINOPHIL # BLD: 2.7 % (ref 0–4.5)
GLUCOSE SERPL-MCNC: 157 MG/DL (ref 74–106)
HCT VFR BLD CALC: 26.5 % (ref 35.4–49)
HGB BLD-MCNC: 8.3 GM/DL (ref 11.7–16.9)
LYMPHOCYTES # BLD: 10.7 % (ref 8–40)
MCH RBC QN AUTO: 25.4 PG (ref 25.7–33.7)
MCHC RBC AUTO-ENTMCNC: 31.3 G/DL (ref 32–35.9)
MCV RBC: 81.2 FL (ref 80–96)
MONOCYTES # BLD AUTO: 7.2 % (ref 3.8–10.2)
NEUTROPHILS # BLD: 78.7 % (ref 42.8–82.8)
PLATELET # BLD AUTO: 256 10^3/UL (ref 134–434)
PMV BLD: 11.1 FL (ref 7.5–11.1)
PROT SERPL-MCNC: 5.7 G/DL (ref 6.4–8.2)
RBC # BLD AUTO: 3.27 M/MM3 (ref 4–5.6)
SODIUM SERPL-SCNC: 142 MMOL/L (ref 136–145)
WBC # BLD AUTO: 11.8 K/MM3 (ref 4–10)

## 2022-03-14 RX ADMIN — ATORVASTATIN CALCIUM SCH MG: 20 TABLET, FILM COATED ORAL at 21:52

## 2022-03-14 RX ADMIN — Medication SCH MG: at 11:48

## 2022-03-14 RX ADMIN — ALLOPURINOL SCH MG: 300 TABLET ORAL at 11:46

## 2022-03-14 RX ADMIN — CARBIDOPA AND LEVODOPA SCH EACH: 25; 250 TABLET ORAL at 06:49

## 2022-03-14 RX ADMIN — MIDODRINE HYDROCHLORIDE SCH MG: 5 TABLET ORAL at 17:37

## 2022-03-14 RX ADMIN — Medication SCH ML: at 11:22

## 2022-03-14 RX ADMIN — INSULIN ASPART SCH UNITS: 100 INJECTION, SOLUTION INTRAVENOUS; SUBCUTANEOUS at 12:38

## 2022-03-14 RX ADMIN — SENNOSIDES SCH MG: 8.8 SYRUP ORAL at 21:52

## 2022-03-14 RX ADMIN — INSULIN ASPART SCH UNITS: 100 INJECTION, SOLUTION INTRAVENOUS; SUBCUTANEOUS at 21:52

## 2022-03-14 RX ADMIN — INSULIN ASPART SCH UNITS: 100 INJECTION, SOLUTION INTRAVENOUS; SUBCUTANEOUS at 17:55

## 2022-03-14 RX ADMIN — ESCITALOPRAM OXALATE SCH MG: 10 TABLET, FILM COATED ORAL at 11:22

## 2022-03-14 RX ADMIN — QUETIAPINE FUMARATE SCH MG: 100 TABLET ORAL at 21:52

## 2022-03-14 RX ADMIN — CARBIDOPA AND LEVODOPA SCH EACH: 25; 250 TABLET ORAL at 11:46

## 2022-03-14 RX ADMIN — CARBIDOPA AND LEVODOPA SCH EACH: 25; 250 TABLET ORAL at 14:45

## 2022-03-14 RX ADMIN — FUROSEMIDE SCH MG: 40 TABLET ORAL at 11:23

## 2022-03-14 RX ADMIN — Medication SCH MG: at 11:22

## 2022-03-14 RX ADMIN — RIVAROXABAN SCH: 10 TABLET, FILM COATED ORAL at 14:43

## 2022-03-14 RX ADMIN — Medication SCH EACH: at 11:45

## 2022-03-14 RX ADMIN — CARBIDOPA AND LEVODOPA SCH EACH: 25; 250 TABLET ORAL at 17:36

## 2022-03-14 RX ADMIN — MIDODRINE HYDROCHLORIDE SCH MG: 5 TABLET ORAL at 11:23

## 2022-03-14 RX ADMIN — PANTOPRAZOLE SODIUM SCH MG: 40 TABLET, DELAYED RELEASE ORAL at 11:22

## 2022-03-14 RX ADMIN — INSULIN ASPART SCH UNITS: 100 INJECTION, SOLUTION INTRAVENOUS; SUBCUTANEOUS at 06:49

## 2022-03-14 RX ADMIN — CEFTRIAXONE SODIUM SCH MLS/HR: 2 INJECTION, POWDER, FOR SOLUTION INTRAMUSCULAR; INTRAVENOUS at 11:23

## 2022-03-14 RX ADMIN — Medication SCH ML: at 17:36

## 2022-03-15 LAB
ANION GAP SERPL CALC-SCNC: 4 MMOL/L (ref 8–16)
BASOPHILS # BLD: 1.2 % (ref 0–2)
BUN SERPL-MCNC: 59.6 MG/DL (ref 7–18)
CALCIUM SERPL-MCNC: 8.9 MG/DL (ref 8.5–10.1)
CHLORIDE SERPL-SCNC: 105 MMOL/L (ref 98–107)
CO2 SERPL-SCNC: 35 MMOL/L (ref 21–32)
CREAT SERPL-MCNC: 1.4 MG/DL (ref 0.55–1.3)
DEPRECATED RDW RBC AUTO: 19.1 % (ref 11.9–15.9)
EOSINOPHIL # BLD: 2.8 % (ref 0–4.5)
GLUCOSE SERPL-MCNC: 104 MG/DL (ref 74–106)
HCT VFR BLD CALC: 26.5 % (ref 35.4–49)
HGB BLD-MCNC: 8.2 GM/DL (ref 11.7–16.9)
LYMPHOCYTES # BLD: 14.1 % (ref 8–40)
MCH RBC QN AUTO: 25.1 PG (ref 25.7–33.7)
MCHC RBC AUTO-ENTMCNC: 30.8 G/DL (ref 32–35.9)
MCV RBC: 81.4 FL (ref 80–96)
MONOCYTES # BLD AUTO: 9 % (ref 3.8–10.2)
NEUTROPHILS # BLD: 72.9 % (ref 42.8–82.8)
PLATELET # BLD AUTO: 235 10^3/UL (ref 134–434)
PMV BLD: 11.6 FL (ref 7.5–11.1)
RBC # BLD AUTO: 3.26 M/MM3 (ref 4–5.6)
SODIUM SERPL-SCNC: 144 MMOL/L (ref 136–145)
WBC # BLD AUTO: 10 K/MM3 (ref 4–10)

## 2022-03-15 RX ADMIN — ATORVASTATIN CALCIUM SCH MG: 20 TABLET, FILM COATED ORAL at 21:04

## 2022-03-15 RX ADMIN — PANTOPRAZOLE SODIUM SCH MG: 40 TABLET, DELAYED RELEASE ORAL at 09:50

## 2022-03-15 RX ADMIN — Medication SCH MG: at 09:52

## 2022-03-15 RX ADMIN — ESCITALOPRAM OXALATE SCH MG: 10 TABLET, FILM COATED ORAL at 09:50

## 2022-03-15 RX ADMIN — SCOPALAMINE SCH PATCH: 1 PATCH, EXTENDED RELEASE TRANSDERMAL at 09:51

## 2022-03-15 RX ADMIN — Medication SCH MG: at 09:50

## 2022-03-15 RX ADMIN — INSULIN ASPART SCH UNITS: 100 INJECTION, SOLUTION INTRAVENOUS; SUBCUTANEOUS at 21:04

## 2022-03-15 RX ADMIN — FUROSEMIDE SCH MG: 40 TABLET ORAL at 09:50

## 2022-03-15 RX ADMIN — INSULIN ASPART SCH: 100 INJECTION, SOLUTION INTRAVENOUS; SUBCUTANEOUS at 17:25

## 2022-03-15 RX ADMIN — Medication SCH ML: at 17:11

## 2022-03-15 RX ADMIN — CARBIDOPA AND LEVODOPA SCH EACH: 25; 250 TABLET ORAL at 17:11

## 2022-03-15 RX ADMIN — CARBIDOPA AND LEVODOPA SCH EACH: 25; 250 TABLET ORAL at 06:32

## 2022-03-15 RX ADMIN — FUROSEMIDE SCH MG: 10 INJECTION, SOLUTION INTRAVENOUS at 13:42

## 2022-03-15 RX ADMIN — MIDODRINE HYDROCHLORIDE SCH MG: 5 TABLET ORAL at 17:11

## 2022-03-15 RX ADMIN — CARBIDOPA AND LEVODOPA SCH EACH: 25; 250 TABLET ORAL at 13:42

## 2022-03-15 RX ADMIN — QUETIAPINE FUMARATE SCH MG: 100 TABLET ORAL at 21:04

## 2022-03-15 RX ADMIN — MIDODRINE HYDROCHLORIDE SCH MG: 5 TABLET ORAL at 09:50

## 2022-03-15 RX ADMIN — Medication SCH EACH: at 09:53

## 2022-03-15 RX ADMIN — SENNOSIDES SCH MG: 8.8 SYRUP ORAL at 21:04

## 2022-03-15 RX ADMIN — INSULIN ASPART SCH: 100 INJECTION, SOLUTION INTRAVENOUS; SUBCUTANEOUS at 06:31

## 2022-03-15 RX ADMIN — Medication SCH ML: at 09:51

## 2022-03-15 RX ADMIN — INSULIN ASPART SCH UNITS: 100 INJECTION, SOLUTION INTRAVENOUS; SUBCUTANEOUS at 12:20

## 2022-03-15 RX ADMIN — ALLOPURINOL SCH MG: 300 TABLET ORAL at 09:53

## 2022-03-15 RX ADMIN — CARBIDOPA AND LEVODOPA SCH EACH: 25; 250 TABLET ORAL at 09:53

## 2022-03-16 LAB
ALBUMIN SERPL-MCNC: 2.3 G/DL (ref 3.4–5)
ALP SERPL-CCNC: 171 U/L (ref 45–117)
ALT SERPL-CCNC: 10 U/L (ref 13–61)
ANION GAP SERPL CALC-SCNC: 5 MMOL/L (ref 8–16)
AST SERPL-CCNC: 18 U/L (ref 15–37)
BILIRUB SERPL-MCNC: 0.6 MG/DL (ref 0.2–1)
BUN SERPL-MCNC: 56.8 MG/DL (ref 7–18)
CALCIUM SERPL-MCNC: 8.6 MG/DL (ref 8.5–10.1)
CHLORIDE SERPL-SCNC: 103 MMOL/L (ref 98–107)
CO2 SERPL-SCNC: 36 MMOL/L (ref 21–32)
CREAT SERPL-MCNC: 1.4 MG/DL (ref 0.55–1.3)
GLUCOSE SERPL-MCNC: 160 MG/DL (ref 74–106)
PROT SERPL-MCNC: 5.5 G/DL (ref 6.4–8.2)
SODIUM SERPL-SCNC: 144 MMOL/L (ref 136–145)

## 2022-03-16 RX ADMIN — FERROUS SULFATE TAB EC 324 MG (65 MG FE EQUIVALENT) SCH MG: 324 (65 FE) TABLET DELAYED RESPONSE at 17:38

## 2022-03-16 RX ADMIN — Medication SCH MG: at 09:43

## 2022-03-16 RX ADMIN — CARBIDOPA AND LEVODOPA SCH EACH: 25; 250 TABLET ORAL at 06:13

## 2022-03-16 RX ADMIN — CARBIDOPA AND LEVODOPA SCH EACH: 25; 250 TABLET ORAL at 09:45

## 2022-03-16 RX ADMIN — Medication SCH MG: at 09:46

## 2022-03-16 RX ADMIN — INSULIN ASPART SCH: 100 INJECTION, SOLUTION INTRAVENOUS; SUBCUTANEOUS at 06:24

## 2022-03-16 RX ADMIN — PANTOPRAZOLE SODIUM SCH MG: 40 TABLET, DELAYED RELEASE ORAL at 09:44

## 2022-03-16 RX ADMIN — Medication SCH EACH: at 09:44

## 2022-03-16 RX ADMIN — INSULIN ASPART SCH UNITS: 100 INJECTION, SOLUTION INTRAVENOUS; SUBCUTANEOUS at 21:45

## 2022-03-16 RX ADMIN — MIDODRINE HYDROCHLORIDE SCH MG: 5 TABLET ORAL at 09:44

## 2022-03-16 RX ADMIN — QUETIAPINE FUMARATE SCH MG: 100 TABLET ORAL at 21:46

## 2022-03-16 RX ADMIN — INSULIN ASPART SCH UNITS: 100 INJECTION, SOLUTION INTRAVENOUS; SUBCUTANEOUS at 15:51

## 2022-03-16 RX ADMIN — FUROSEMIDE SCH MG: 10 INJECTION, SOLUTION INTRAVENOUS at 06:12

## 2022-03-16 RX ADMIN — SENNOSIDES SCH MG: 8.8 SYRUP ORAL at 21:46

## 2022-03-16 RX ADMIN — CARBIDOPA AND LEVODOPA SCH EACH: 25; 250 TABLET ORAL at 13:24

## 2022-03-16 RX ADMIN — Medication SCH ML: at 09:43

## 2022-03-16 RX ADMIN — MIDODRINE HYDROCHLORIDE SCH MG: 5 TABLET ORAL at 17:38

## 2022-03-16 RX ADMIN — ALLOPURINOL SCH MG: 300 TABLET ORAL at 09:45

## 2022-03-16 RX ADMIN — INSULIN ASPART SCH UNITS: 100 INJECTION, SOLUTION INTRAVENOUS; SUBCUTANEOUS at 12:09

## 2022-03-16 RX ADMIN — Medication SCH ML: at 17:38

## 2022-03-16 RX ADMIN — ESCITALOPRAM OXALATE SCH MG: 10 TABLET, FILM COATED ORAL at 09:44

## 2022-03-16 RX ADMIN — CARBIDOPA AND LEVODOPA SCH EACH: 25; 250 TABLET ORAL at 17:38

## 2022-03-16 RX ADMIN — ATORVASTATIN CALCIUM SCH MG: 20 TABLET, FILM COATED ORAL at 21:45

## 2022-03-17 RX ADMIN — FOLIC ACID SCH MG: 1 TABLET ORAL at 10:46

## 2022-03-17 RX ADMIN — Medication SCH ML: at 10:45

## 2022-03-17 RX ADMIN — CARBIDOPA AND LEVODOPA SCH EACH: 25; 250 TABLET ORAL at 11:00

## 2022-03-17 RX ADMIN — Medication SCH ML: at 17:40

## 2022-03-17 RX ADMIN — ATORVASTATIN CALCIUM SCH MG: 20 TABLET, FILM COATED ORAL at 21:12

## 2022-03-17 RX ADMIN — CARBIDOPA AND LEVODOPA SCH EACH: 25; 250 TABLET ORAL at 06:14

## 2022-03-17 RX ADMIN — MIDODRINE HYDROCHLORIDE SCH MG: 5 TABLET ORAL at 10:46

## 2022-03-17 RX ADMIN — Medication SCH MG: at 11:02

## 2022-03-17 RX ADMIN — INSULIN ASPART SCH UNITS: 100 INJECTION, SOLUTION INTRAVENOUS; SUBCUTANEOUS at 17:30

## 2022-03-17 RX ADMIN — INSULIN ASPART SCH: 100 INJECTION, SOLUTION INTRAVENOUS; SUBCUTANEOUS at 06:02

## 2022-03-17 RX ADMIN — Medication SCH MG: at 10:46

## 2022-03-17 RX ADMIN — FERROUS SULFATE TAB EC 324 MG (65 MG FE EQUIVALENT) SCH MG: 324 (65 FE) TABLET DELAYED RESPONSE at 10:45

## 2022-03-17 RX ADMIN — Medication SCH EACH: at 10:59

## 2022-03-17 RX ADMIN — CARBIDOPA AND LEVODOPA SCH EACH: 25; 250 TABLET ORAL at 13:38

## 2022-03-17 RX ADMIN — SENNOSIDES SCH MG: 8.8 SYRUP ORAL at 21:12

## 2022-03-17 RX ADMIN — ESCITALOPRAM OXALATE SCH MG: 10 TABLET, FILM COATED ORAL at 10:46

## 2022-03-17 RX ADMIN — PANTOPRAZOLE SODIUM SCH MG: 40 TABLET, DELAYED RELEASE ORAL at 10:46

## 2022-03-17 RX ADMIN — INSULIN ASPART SCH UNITS: 100 INJECTION, SOLUTION INTRAVENOUS; SUBCUTANEOUS at 12:17

## 2022-03-17 RX ADMIN — ALLOPURINOL SCH MG: 300 TABLET ORAL at 11:00

## 2022-03-17 RX ADMIN — FERROUS SULFATE TAB EC 324 MG (65 MG FE EQUIVALENT) SCH MG: 324 (65 FE) TABLET DELAYED RESPONSE at 17:40

## 2022-03-17 RX ADMIN — FUROSEMIDE SCH MG: 10 INJECTION, SOLUTION INTRAVENOUS at 10:45

## 2022-03-17 RX ADMIN — INSULIN ASPART SCH UNITS: 100 INJECTION, SOLUTION INTRAVENOUS; SUBCUTANEOUS at 21:13

## 2022-03-17 RX ADMIN — MIDODRINE HYDROCHLORIDE SCH MG: 5 TABLET ORAL at 17:41

## 2022-03-17 RX ADMIN — CARBIDOPA AND LEVODOPA SCH EACH: 25; 250 TABLET ORAL at 17:40

## 2022-03-17 RX ADMIN — QUETIAPINE FUMARATE SCH MG: 100 TABLET ORAL at 21:13

## 2022-03-17 RX ADMIN — OXYCODONE HYDROCHLORIDE AND ACETAMINOPHEN SCH MG: 500 TABLET ORAL at 10:45

## 2022-03-18 RX ADMIN — ALLOPURINOL SCH MG: 300 TABLET ORAL at 11:19

## 2022-03-18 RX ADMIN — FERROUS SULFATE TAB EC 324 MG (65 MG FE EQUIVALENT) SCH MG: 324 (65 FE) TABLET DELAYED RESPONSE at 10:35

## 2022-03-18 RX ADMIN — INSULIN ASPART SCH: 100 INJECTION, SOLUTION INTRAVENOUS; SUBCUTANEOUS at 17:02

## 2022-03-18 RX ADMIN — INSULIN ASPART SCH UNITS: 100 INJECTION, SOLUTION INTRAVENOUS; SUBCUTANEOUS at 12:14

## 2022-03-18 RX ADMIN — SCOPALAMINE SCH PATCH: 1 PATCH, EXTENDED RELEASE TRANSDERMAL at 10:38

## 2022-03-18 RX ADMIN — SENNOSIDES SCH MG: 8.8 SYRUP ORAL at 21:36

## 2022-03-18 RX ADMIN — Medication SCH EACH: at 10:36

## 2022-03-18 RX ADMIN — FUROSEMIDE SCH MG: 10 INJECTION, SOLUTION INTRAVENOUS at 10:52

## 2022-03-18 RX ADMIN — CARBIDOPA AND LEVODOPA SCH EACH: 25; 250 TABLET ORAL at 17:02

## 2022-03-18 RX ADMIN — FOLIC ACID SCH MG: 1 TABLET ORAL at 10:35

## 2022-03-18 RX ADMIN — Medication SCH ML: at 10:45

## 2022-03-18 RX ADMIN — Medication SCH ML: at 17:02

## 2022-03-18 RX ADMIN — OXYCODONE HYDROCHLORIDE AND ACETAMINOPHEN SCH MG: 500 TABLET ORAL at 10:36

## 2022-03-18 RX ADMIN — PANTOPRAZOLE SODIUM SCH MG: 40 TABLET, DELAYED RELEASE ORAL at 10:35

## 2022-03-18 RX ADMIN — Medication SCH MG: at 10:34

## 2022-03-18 RX ADMIN — ATORVASTATIN CALCIUM SCH MG: 20 TABLET, FILM COATED ORAL at 21:35

## 2022-03-18 RX ADMIN — CARBIDOPA AND LEVODOPA SCH EACH: 25; 250 TABLET ORAL at 05:51

## 2022-03-18 RX ADMIN — INSULIN ASPART SCH: 100 INJECTION, SOLUTION INTRAVENOUS; SUBCUTANEOUS at 06:02

## 2022-03-18 RX ADMIN — CARBIDOPA AND LEVODOPA SCH EACH: 25; 250 TABLET ORAL at 14:25

## 2022-03-18 RX ADMIN — INSULIN ASPART SCH UNITS: 100 INJECTION, SOLUTION INTRAVENOUS; SUBCUTANEOUS at 21:35

## 2022-03-18 RX ADMIN — ESCITALOPRAM OXALATE SCH MG: 10 TABLET, FILM COATED ORAL at 10:35

## 2022-03-18 RX ADMIN — MIDODRINE HYDROCHLORIDE SCH MG: 5 TABLET ORAL at 10:36

## 2022-03-18 RX ADMIN — Medication SCH MG: at 10:38

## 2022-03-18 RX ADMIN — QUETIAPINE FUMARATE SCH MG: 100 TABLET ORAL at 21:36

## 2022-03-18 RX ADMIN — CARBIDOPA AND LEVODOPA SCH EACH: 25; 250 TABLET ORAL at 10:37

## 2022-03-18 RX ADMIN — FERROUS SULFATE TAB EC 324 MG (65 MG FE EQUIVALENT) SCH MG: 324 (65 FE) TABLET DELAYED RESPONSE at 17:02

## 2022-03-18 RX ADMIN — MIDODRINE HYDROCHLORIDE SCH MG: 5 TABLET ORAL at 17:54

## 2022-03-19 VITALS — TEMPERATURE: 98.2 F | DIASTOLIC BLOOD PRESSURE: 52 MMHG | HEART RATE: 74 BPM | SYSTOLIC BLOOD PRESSURE: 123 MMHG

## 2022-03-19 LAB
ALBUMIN SERPL-MCNC: 2.6 G/DL (ref 3.4–5)
ALP SERPL-CCNC: 168 U/L (ref 45–117)
ALT SERPL-CCNC: 8 U/L (ref 13–61)
ANION GAP SERPL CALC-SCNC: 3 MMOL/L (ref 8–16)
AST SERPL-CCNC: 16 U/L (ref 15–37)
BILIRUB SERPL-MCNC: 0.6 MG/DL (ref 0.2–1)
BUN SERPL-MCNC: 56.5 MG/DL (ref 7–18)
CALCIUM SERPL-MCNC: 8.6 MG/DL (ref 8.5–10.1)
CHLORIDE SERPL-SCNC: 102 MMOL/L (ref 98–107)
CO2 SERPL-SCNC: 38 MMOL/L (ref 21–32)
CREAT SERPL-MCNC: 1.5 MG/DL (ref 0.55–1.3)
GLUCOSE SERPL-MCNC: 133 MG/DL (ref 74–106)
PROT SERPL-MCNC: 6 G/DL (ref 6.4–8.2)
SODIUM SERPL-SCNC: 143 MMOL/L (ref 136–145)

## 2022-03-19 RX ADMIN — OXYCODONE HYDROCHLORIDE AND ACETAMINOPHEN SCH MG: 500 TABLET ORAL at 09:50

## 2022-03-19 RX ADMIN — CARBIDOPA AND LEVODOPA SCH EACH: 25; 250 TABLET ORAL at 09:53

## 2022-03-19 RX ADMIN — Medication SCH MG: at 09:50

## 2022-03-19 RX ADMIN — FOLIC ACID SCH MG: 1 TABLET ORAL at 09:50

## 2022-03-19 RX ADMIN — ALLOPURINOL SCH MG: 300 TABLET ORAL at 09:53

## 2022-03-19 RX ADMIN — INSULIN ASPART SCH: 100 INJECTION, SOLUTION INTRAVENOUS; SUBCUTANEOUS at 06:01

## 2022-03-19 RX ADMIN — Medication SCH MG: at 09:51

## 2022-03-19 RX ADMIN — ESCITALOPRAM OXALATE SCH MG: 10 TABLET, FILM COATED ORAL at 09:51

## 2022-03-19 RX ADMIN — FUROSEMIDE SCH MG: 10 INJECTION, SOLUTION INTRAVENOUS at 09:50

## 2022-03-19 RX ADMIN — FERROUS SULFATE TAB EC 324 MG (65 MG FE EQUIVALENT) SCH MG: 324 (65 FE) TABLET DELAYED RESPONSE at 09:50

## 2022-03-19 RX ADMIN — Medication SCH EACH: at 09:53

## 2022-03-19 RX ADMIN — CARBIDOPA AND LEVODOPA SCH EACH: 25; 250 TABLET ORAL at 05:59

## 2022-03-19 RX ADMIN — INSULIN ASPART SCH UNITS: 100 INJECTION, SOLUTION INTRAVENOUS; SUBCUTANEOUS at 11:48

## 2022-03-19 RX ADMIN — PANTOPRAZOLE SODIUM SCH MG: 40 TABLET, DELAYED RELEASE ORAL at 09:50

## 2022-03-19 RX ADMIN — CARBIDOPA AND LEVODOPA SCH: 25; 250 TABLET ORAL at 15:23

## 2022-03-19 RX ADMIN — MIDODRINE HYDROCHLORIDE SCH MG: 5 TABLET ORAL at 09:50

## 2022-03-19 RX ADMIN — Medication SCH ML: at 09:50

## 2022-03-25 ENCOUNTER — HOSPITAL ENCOUNTER (INPATIENT)
Dept: HOSPITAL 74 - JER | Age: 77
LOS: 7 days | Discharge: NURSING, REHAB OR LONG-TERM CARE | DRG: 177 | End: 2022-04-01
Attending: FAMILY MEDICINE | Admitting: STUDENT IN AN ORGANIZED HEALTH CARE EDUCATION/TRAINING PROGRAM
Payer: COMMERCIAL

## 2022-03-25 VITALS — BODY MASS INDEX: 23.4 KG/M2

## 2022-03-25 DIAGNOSIS — D68.8: ICD-10-CM

## 2022-03-25 DIAGNOSIS — E87.5: ICD-10-CM

## 2022-03-25 DIAGNOSIS — N17.9: ICD-10-CM

## 2022-03-25 DIAGNOSIS — R26.81: ICD-10-CM

## 2022-03-25 DIAGNOSIS — I25.10: ICD-10-CM

## 2022-03-25 DIAGNOSIS — N18.9: ICD-10-CM

## 2022-03-25 DIAGNOSIS — R50.9: ICD-10-CM

## 2022-03-25 DIAGNOSIS — M10.9: ICD-10-CM

## 2022-03-25 DIAGNOSIS — Z99.81: ICD-10-CM

## 2022-03-25 DIAGNOSIS — G20: ICD-10-CM

## 2022-03-25 DIAGNOSIS — J69.0: Primary | ICD-10-CM

## 2022-03-25 DIAGNOSIS — Z95.5: ICD-10-CM

## 2022-03-25 DIAGNOSIS — E78.00: ICD-10-CM

## 2022-03-25 DIAGNOSIS — Z95.0: ICD-10-CM

## 2022-03-25 DIAGNOSIS — N40.0: ICD-10-CM

## 2022-03-25 DIAGNOSIS — D64.9: ICD-10-CM

## 2022-03-25 DIAGNOSIS — F02.80: ICD-10-CM

## 2022-03-25 DIAGNOSIS — I50.23: ICD-10-CM

## 2022-03-25 DIAGNOSIS — Z86.73: ICD-10-CM

## 2022-03-25 DIAGNOSIS — L89.153: ICD-10-CM

## 2022-03-25 DIAGNOSIS — E11.22: ICD-10-CM

## 2022-03-25 DIAGNOSIS — R32: ICD-10-CM

## 2022-03-25 DIAGNOSIS — J44.0: ICD-10-CM

## 2022-03-25 DIAGNOSIS — J44.9: ICD-10-CM

## 2022-03-25 DIAGNOSIS — R78.81: ICD-10-CM

## 2022-03-25 DIAGNOSIS — I13.0: ICD-10-CM

## 2022-03-25 DIAGNOSIS — F29: ICD-10-CM

## 2022-03-25 DIAGNOSIS — R13.10: ICD-10-CM

## 2022-03-25 DIAGNOSIS — N39.0: ICD-10-CM

## 2022-03-25 LAB
ALBUMIN SERPL-MCNC: 2.8 G/DL (ref 3.4–5)
ALP SERPL-CCNC: 194 U/L (ref 45–117)
ALT SERPL-CCNC: 9 U/L (ref 13–61)
ANION GAP SERPL CALC-SCNC: 4 MMOL/L (ref 8–16)
ANION GAP SERPL CALC-SCNC: 4 MMOL/L (ref 8–16)
APPEARANCE UR: CLEAR
APTT BLD: 41.2 SECONDS (ref 25.2–36.5)
AST SERPL-CCNC: 55 U/L (ref 15–37)
BACTERIA # UR AUTO: 12 /UL (ref 0–1359)
BASE EXCESS BLDV CALC-SCNC: 7.3 MMOL/L (ref -2–2)
BASOPHILS # BLD: 0.8 % (ref 0–2)
BILIRUB SERPL-MCNC: 0.9 MG/DL (ref 0.2–1)
BILIRUB UR STRIP.AUTO-MCNC: NEGATIVE MG/DL
BNP SERPL-MCNC: (no result) PG/ML (ref 5–450)
BUN SERPL-MCNC: 50.3 MG/DL (ref 7–18)
BUN SERPL-MCNC: 50.3 MG/DL (ref 7–18)
CALCIUM SERPL-MCNC: 8.6 MG/DL (ref 8.5–10.1)
CALCIUM SERPL-MCNC: 8.7 MG/DL (ref 8.5–10.1)
CASTS URNS QL MICRO: 2 /UL (ref 0–3.1)
CHLORIDE SERPL-SCNC: 100 MMOL/L (ref 98–107)
CHLORIDE SERPL-SCNC: 98 MMOL/L (ref 98–107)
CO2 SERPL-SCNC: 34 MMOL/L (ref 21–32)
CO2 SERPL-SCNC: 35 MMOL/L (ref 21–32)
COLOR UR: YELLOW
CREAT SERPL-MCNC: 1.3 MG/DL (ref 0.55–1.3)
CREAT SERPL-MCNC: 1.5 MG/DL (ref 0.55–1.3)
DEPRECATED RDW RBC AUTO: 20.4 % (ref 11.9–15.9)
EOSINOPHIL # BLD: 3.2 % (ref 0–4.5)
EPITH CASTS URNS QL MICRO: 26 /UL (ref 0–25.1)
GLUCOSE SERPL-MCNC: 121 MG/DL (ref 74–106)
GLUCOSE SERPL-MCNC: 132 MG/DL (ref 74–106)
HCT VFR BLD CALC: 27.1 % (ref 35.4–49)
HCT VFR BLDV CALC: 27 % (ref 35.4–49)
HGB BLD-MCNC: 8.3 GM/DL (ref 11.7–16.9)
INR BLD: 2.11 (ref 0.83–1.09)
KETONES UR QL STRIP: NEGATIVE
LEUKOCYTE ESTERASE UR QL STRIP.AUTO: (no result)
LYMPHOCYTES # BLD: 15 % (ref 8–40)
MCH RBC QN AUTO: 24.9 PG (ref 25.7–33.7)
MCHC RBC AUTO-ENTMCNC: 30.5 G/DL (ref 32–35.9)
MCV RBC: 81.6 FL (ref 80–96)
MONOCYTES # BLD AUTO: 7.5 % (ref 3.8–10.2)
NEUTROPHILS # BLD: 73.5 % (ref 42.8–82.8)
NITRITE UR QL STRIP: NEGATIVE
PCO2 BLDV: 64.1 MMHG (ref 38–52)
PH BLDV: 7.35 [PH] (ref 7.31–7.41)
PH UR: 7 [PH] (ref 5–8)
PLATELET # BLD AUTO: 236 10^3/UL (ref 134–434)
PMV BLD: 12.7 FL (ref 7.5–11.1)
PROT SERPL-MCNC: 6.4 G/DL (ref 6.4–8.2)
PROT UR QL STRIP: NEGATIVE
PROT UR QL STRIP: NEGATIVE
PT PNL PPP: 24.4 SEC (ref 9.7–13)
RBC # BLD AUTO: 14 /UL (ref 0–23.9)
RBC # BLD AUTO: 3.32 M/MM3 (ref 4–5.6)
SAO2 % BLDV: 42.2 % (ref 70–80)
SODIUM SERPL-SCNC: 136 MMOL/L (ref 136–145)
SODIUM SERPL-SCNC: 139 MMOL/L (ref 136–145)
SP GR UR: 1.01 (ref 1.01–1.03)
UROBILINOGEN UR STRIP-MCNC: 1 MG/DL (ref 0.2–1)
WBC # BLD AUTO: 9.2 K/MM3 (ref 4–10)
WBC # UR AUTO: 300 /UL (ref 0–25.8)
YEAST BUDDING URNS QL: (no result)

## 2022-03-25 PROCEDURE — U0005 INFEC AGEN DETEC AMPLI PROBE: HCPCS

## 2022-03-25 PROCEDURE — U0003 INFECTIOUS AGENT DETECTION BY NUCLEIC ACID (DNA OR RNA); SEVERE ACUTE RESPIRATORY SYNDROME CORONAVIRUS 2 (SARS-COV-2) (CORONAVIRUS DISEASE [COVID-19]), AMPLIFIED PROBE TECHNIQUE, MAKING USE OF HIGH THROUGHPUT TECHNOLOGIES AS DESCRIBED BY CMS-2020-01-R: HCPCS

## 2022-03-25 RX ADMIN — CARBIDOPA AND LEVODOPA SCH EACH: 25; 250 TABLET ORAL at 22:43

## 2022-03-25 RX ADMIN — MIDODRINE HYDROCHLORIDE SCH MG: 5 TABLET ORAL at 22:43

## 2022-03-26 RX ADMIN — PIPERACILLIN SODIUM,TAZOBACTAM SODIUM SCH MLS/HR: 3; .375 INJECTION, POWDER, FOR SOLUTION INTRAVENOUS at 13:50

## 2022-03-26 RX ADMIN — OXYCODONE HYDROCHLORIDE AND ACETAMINOPHEN SCH MG: 500 TABLET ORAL at 09:15

## 2022-03-26 RX ADMIN — ESCITALOPRAM OXALATE SCH MG: 10 TABLET, FILM COATED ORAL at 09:15

## 2022-03-26 RX ADMIN — ALLOPURINOL SCH MG: 300 TABLET ORAL at 09:16

## 2022-03-26 RX ADMIN — CARBIDOPA AND LEVODOPA SCH EACH: 25; 250 TABLET ORAL at 17:22

## 2022-03-26 RX ADMIN — CARBIDOPA AND LEVODOPA SCH EACH: 25; 250 TABLET ORAL at 09:16

## 2022-03-26 RX ADMIN — CARBIDOPA AND LEVODOPA SCH EACH: 25; 250 TABLET ORAL at 07:26

## 2022-03-26 RX ADMIN — RIVAROXABAN SCH MG: 10 TABLET, FILM COATED ORAL at 09:16

## 2022-03-26 RX ADMIN — FOLIC ACID SCH MG: 1 TABLET ORAL at 09:15

## 2022-03-26 RX ADMIN — PIPERACILLIN SODIUM,TAZOBACTAM SODIUM SCH: 3; .375 INJECTION, POWDER, FOR SOLUTION INTRAVENOUS at 17:22

## 2022-03-26 RX ADMIN — QUETIAPINE FUMARATE SCH MG: 100 TABLET ORAL at 22:07

## 2022-03-26 RX ADMIN — CEFEPIME HYDROCHLORIDE SCH MLS/HR: 1 INJECTION, POWDER, FOR SOLUTION INTRAMUSCULAR; INTRAVENOUS at 06:00

## 2022-03-26 RX ADMIN — CEFEPIME HYDROCHLORIDE SCH: 1 INJECTION, POWDER, FOR SOLUTION INTRAMUSCULAR; INTRAVENOUS at 09:17

## 2022-03-26 RX ADMIN — ATORVASTATIN CALCIUM SCH MG: 20 TABLET, FILM COATED ORAL at 22:02

## 2022-03-26 RX ADMIN — PANTOPRAZOLE SODIUM SCH MG: 40 TABLET, DELAYED RELEASE ORAL at 09:15

## 2022-03-26 RX ADMIN — MIDODRINE HYDROCHLORIDE SCH MG: 5 TABLET ORAL at 09:15

## 2022-03-26 RX ADMIN — CARBIDOPA AND LEVODOPA SCH EACH: 25; 250 TABLET ORAL at 13:51

## 2022-03-26 RX ADMIN — MIDODRINE HYDROCHLORIDE SCH MG: 5 TABLET ORAL at 17:22

## 2022-03-27 LAB
ALBUMIN SERPL-MCNC: 2.6 G/DL (ref 3.4–5)
ALP SERPL-CCNC: 180 U/L (ref 45–117)
ALT SERPL-CCNC: 19 U/L (ref 13–61)
ANION GAP SERPL CALC-SCNC: 5 MMOL/L (ref 8–16)
ANISOCYTOSIS BLD QL: 0
AST SERPL-CCNC: 114 U/L (ref 15–37)
BASOPHILS # BLD: 0.9 % (ref 0–2)
BILIRUB SERPL-MCNC: 1.3 MG/DL (ref 0.2–1)
BUN SERPL-MCNC: 54.2 MG/DL (ref 7–18)
CALCIUM SERPL-MCNC: 8.8 MG/DL (ref 8.5–10.1)
CHLORIDE SERPL-SCNC: 100 MMOL/L (ref 98–107)
CO2 SERPL-SCNC: 35 MMOL/L (ref 21–32)
CREAT SERPL-MCNC: 1.8 MG/DL (ref 0.55–1.3)
DEPRECATED RDW RBC AUTO: 20.5 % (ref 11.9–15.9)
EOSINOPHIL # BLD: 1.1 % (ref 0–4.5)
GLUCOSE SERPL-MCNC: 251 MG/DL (ref 74–106)
HCT VFR BLD CALC: 27.6 % (ref 35.4–49)
HGB BLD-MCNC: 8.6 GM/DL (ref 11.7–16.9)
IRON SERPL-MCNC: 28 UG/DL (ref 50–175)
LYMPHOCYTES # BLD: 12.8 % (ref 8–40)
MACROCYTES BLD QL: 0
MAGNESIUM SERPL-MCNC: 2.3 MG/DL (ref 1.8–2.4)
MCH RBC QN AUTO: 25.1 PG (ref 25.7–33.7)
MCHC RBC AUTO-ENTMCNC: 31.1 G/DL (ref 32–35.9)
MCV RBC: 80.8 FL (ref 80–96)
MONOCYTES # BLD AUTO: 10.6 % (ref 3.8–10.2)
NEUTROPHILS # BLD: 74.6 % (ref 42.8–82.8)
PHOSPHATE SERPL-MCNC: 4.1 MG/DL (ref 2.5–4.9)
PLATELET # BLD AUTO: 229 10^3/UL (ref 134–434)
PLATELET BLD QL SMEAR: NORMAL
PMV BLD: 12.3 FL (ref 7.5–11.1)
PROT SERPL-MCNC: 5.8 G/DL (ref 6.4–8.2)
RBC # BLD AUTO: 3.41 M/MM3 (ref 4–5.6)
SODIUM SERPL-SCNC: 140 MMOL/L (ref 136–145)
TIBC SERPL-MCNC: 185 UG/DL (ref 250–450)
WBC # BLD AUTO: 10.1 K/MM3 (ref 4–10)

## 2022-03-27 RX ADMIN — CARBIDOPA AND LEVODOPA SCH EACH: 25; 250 TABLET ORAL at 09:57

## 2022-03-27 RX ADMIN — MIDODRINE HYDROCHLORIDE SCH MG: 5 TABLET ORAL at 09:56

## 2022-03-27 RX ADMIN — Medication SCH ML: at 16:58

## 2022-03-27 RX ADMIN — ATORVASTATIN CALCIUM SCH MG: 20 TABLET, FILM COATED ORAL at 21:05

## 2022-03-27 RX ADMIN — OXYCODONE HYDROCHLORIDE AND ACETAMINOPHEN SCH MG: 500 TABLET ORAL at 09:56

## 2022-03-27 RX ADMIN — QUETIAPINE FUMARATE SCH MG: 100 TABLET ORAL at 21:05

## 2022-03-27 RX ADMIN — PIPERACILLIN SODIUM,TAZOBACTAM SODIUM SCH MLS/HR: 3; .375 INJECTION, POWDER, FOR SOLUTION INTRAVENOUS at 01:12

## 2022-03-27 RX ADMIN — CARBIDOPA AND LEVODOPA SCH EACH: 25; 250 TABLET ORAL at 16:59

## 2022-03-27 RX ADMIN — PANTOPRAZOLE SODIUM SCH MG: 40 TABLET, DELAYED RELEASE ORAL at 09:56

## 2022-03-27 RX ADMIN — MIDODRINE HYDROCHLORIDE SCH MG: 5 TABLET ORAL at 16:59

## 2022-03-27 RX ADMIN — CARBIDOPA AND LEVODOPA SCH EACH: 25; 250 TABLET ORAL at 13:06

## 2022-03-27 RX ADMIN — PIPERACILLIN SODIUM,TAZOBACTAM SODIUM SCH MLS/HR: 3; .375 INJECTION, POWDER, FOR SOLUTION INTRAVENOUS at 16:59

## 2022-03-27 RX ADMIN — ESCITALOPRAM OXALATE SCH MG: 10 TABLET, FILM COATED ORAL at 09:56

## 2022-03-27 RX ADMIN — ALLOPURINOL SCH MG: 300 TABLET ORAL at 09:57

## 2022-03-27 RX ADMIN — CARBIDOPA AND LEVODOPA SCH EACH: 25; 250 TABLET ORAL at 05:46

## 2022-03-27 RX ADMIN — RIVAROXABAN SCH MG: 10 TABLET, FILM COATED ORAL at 09:57

## 2022-03-27 RX ADMIN — PIPERACILLIN SODIUM,TAZOBACTAM SODIUM SCH MLS/HR: 3; .375 INJECTION, POWDER, FOR SOLUTION INTRAVENOUS at 09:56

## 2022-03-27 RX ADMIN — FOLIC ACID SCH MG: 1 TABLET ORAL at 09:56

## 2022-03-28 LAB
ALBUMIN SERPL-MCNC: 2.4 G/DL (ref 3.4–5)
ALP SERPL-CCNC: 154 U/L (ref 45–117)
ALT SERPL-CCNC: 20 U/L (ref 13–61)
ANION GAP SERPL CALC-SCNC: 5 MMOL/L (ref 8–16)
AST SERPL-CCNC: 104 U/L (ref 15–37)
BILIRUB SERPL-MCNC: 1 MG/DL (ref 0.2–1)
BUN SERPL-MCNC: 48.4 MG/DL (ref 7–18)
CALCIUM SERPL-MCNC: 8.2 MG/DL (ref 8.5–10.1)
CHLORIDE SERPL-SCNC: 103 MMOL/L (ref 98–107)
CO2 SERPL-SCNC: 34 MMOL/L (ref 21–32)
CREAT SERPL-MCNC: 1.8 MG/DL (ref 0.55–1.3)
GLUCOSE SERPL-MCNC: 239 MG/DL (ref 74–106)
PROT SERPL-MCNC: 5.5 G/DL (ref 6.4–8.2)
SODIUM SERPL-SCNC: 142 MMOL/L (ref 136–145)

## 2022-03-28 RX ADMIN — ESCITALOPRAM OXALATE SCH MG: 10 TABLET, FILM COATED ORAL at 10:56

## 2022-03-28 RX ADMIN — OXYCODONE HYDROCHLORIDE AND ACETAMINOPHEN SCH MG: 500 TABLET ORAL at 10:57

## 2022-03-28 RX ADMIN — QUETIAPINE FUMARATE SCH MG: 100 TABLET ORAL at 22:52

## 2022-03-28 RX ADMIN — Medication SCH ML: at 17:20

## 2022-03-28 RX ADMIN — FOLIC ACID SCH MG: 1 TABLET ORAL at 10:56

## 2022-03-28 RX ADMIN — CARBIDOPA AND LEVODOPA SCH EACH: 25; 250 TABLET ORAL at 17:20

## 2022-03-28 RX ADMIN — CARBIDOPA AND LEVODOPA SCH EACH: 25; 250 TABLET ORAL at 05:53

## 2022-03-28 RX ADMIN — CARBIDOPA AND LEVODOPA SCH EACH: 25; 250 TABLET ORAL at 10:56

## 2022-03-28 RX ADMIN — MIDODRINE HYDROCHLORIDE SCH MG: 5 TABLET ORAL at 10:57

## 2022-03-28 RX ADMIN — PIPERACILLIN SODIUM,TAZOBACTAM SODIUM SCH MLS/HR: 3; .375 INJECTION, POWDER, FOR SOLUTION INTRAVENOUS at 18:04

## 2022-03-28 RX ADMIN — PANTOPRAZOLE SODIUM SCH MG: 40 TABLET, DELAYED RELEASE ORAL at 10:56

## 2022-03-28 RX ADMIN — CARBIDOPA AND LEVODOPA SCH EACH: 25; 250 TABLET ORAL at 14:44

## 2022-03-28 RX ADMIN — ALLOPURINOL SCH MG: 300 TABLET ORAL at 10:58

## 2022-03-28 RX ADMIN — PIPERACILLIN SODIUM,TAZOBACTAM SODIUM SCH MLS/HR: 3; .375 INJECTION, POWDER, FOR SOLUTION INTRAVENOUS at 01:22

## 2022-03-28 RX ADMIN — PIPERACILLIN SODIUM,TAZOBACTAM SODIUM SCH MLS/HR: 3; .375 INJECTION, POWDER, FOR SOLUTION INTRAVENOUS at 10:57

## 2022-03-28 RX ADMIN — MIDODRINE HYDROCHLORIDE SCH MG: 5 TABLET ORAL at 17:20

## 2022-03-28 RX ADMIN — RIVAROXABAN SCH MG: 10 TABLET, FILM COATED ORAL at 11:00

## 2022-03-28 RX ADMIN — Medication SCH ML: at 08:54

## 2022-03-28 RX ADMIN — ATORVASTATIN CALCIUM SCH MG: 20 TABLET, FILM COATED ORAL at 22:52

## 2022-03-29 LAB
ALBUMIN SERPL-MCNC: 2.6 G/DL (ref 3.4–5)
ALP SERPL-CCNC: 162 U/L (ref 45–117)
ALT SERPL-CCNC: 48 U/L (ref 13–61)
ANION GAP SERPL CALC-SCNC: 6 MMOL/L (ref 8–16)
AST SERPL-CCNC: 179 U/L (ref 15–37)
BILIRUB SERPL-MCNC: 1 MG/DL (ref 0.2–1)
BUN SERPL-MCNC: 46.2 MG/DL (ref 7–18)
CALCIUM SERPL-MCNC: 8.2 MG/DL (ref 8.5–10.1)
CHLORIDE SERPL-SCNC: 103 MMOL/L (ref 98–107)
CO2 SERPL-SCNC: 35 MMOL/L (ref 21–32)
CREAT SERPL-MCNC: 1.8 MG/DL (ref 0.55–1.3)
DEPRECATED RDW RBC AUTO: 20 % (ref 11.9–15.9)
GLUCOSE SERPL-MCNC: 324 MG/DL (ref 74–106)
HCT VFR BLD CALC: 26.1 % (ref 35.4–49)
HGB BLD-MCNC: 8.1 GM/DL (ref 11.7–16.9)
INR BLD: 2.96 (ref 0.83–1.09)
MAGNESIUM SERPL-MCNC: 2.3 MG/DL (ref 1.8–2.4)
MCH RBC QN AUTO: 24.9 PG (ref 25.7–33.7)
MCHC RBC AUTO-ENTMCNC: 31 G/DL (ref 32–35.9)
MCV RBC: 80.5 FL (ref 80–96)
PLATELET # BLD AUTO: 209 10^3/UL (ref 134–434)
PMV BLD: 12.4 FL (ref 7.5–11.1)
PROT SERPL-MCNC: 6 G/DL (ref 6.4–8.2)
PT PNL PPP: 34.4 SEC (ref 9.7–13)
RBC # BLD AUTO: 3.25 M/MM3 (ref 4–5.6)
SODIUM SERPL-SCNC: 143 MMOL/L (ref 136–145)
WBC # BLD AUTO: 11.8 K/MM3 (ref 4–10)

## 2022-03-29 RX ADMIN — INSULIN ASPART SCH UNITS: 100 INJECTION, SOLUTION INTRAVENOUS; SUBCUTANEOUS at 21:08

## 2022-03-29 RX ADMIN — Medication SCH ML: at 08:58

## 2022-03-29 RX ADMIN — CARBIDOPA AND LEVODOPA SCH EACH: 25; 250 TABLET ORAL at 18:22

## 2022-03-29 RX ADMIN — ATORVASTATIN CALCIUM SCH MG: 20 TABLET, FILM COATED ORAL at 21:10

## 2022-03-29 RX ADMIN — MIDODRINE HYDROCHLORIDE SCH MG: 5 TABLET ORAL at 18:22

## 2022-03-29 RX ADMIN — MIDODRINE HYDROCHLORIDE SCH MG: 5 TABLET ORAL at 09:03

## 2022-03-29 RX ADMIN — FOLIC ACID SCH MG: 1 TABLET ORAL at 09:02

## 2022-03-29 RX ADMIN — INSULIN ASPART SCH UNITS: 100 INJECTION, SOLUTION INTRAVENOUS; SUBCUTANEOUS at 17:01

## 2022-03-29 RX ADMIN — CARBIDOPA AND LEVODOPA SCH EACH: 25; 250 TABLET ORAL at 13:22

## 2022-03-29 RX ADMIN — CEFEPIME HYDROCHLORIDE SCH: 1 INJECTION, SOLUTION INTRAVENOUS at 07:22

## 2022-03-29 RX ADMIN — QUETIAPINE FUMARATE SCH MG: 100 TABLET ORAL at 21:08

## 2022-03-29 RX ADMIN — CEFEPIME HYDROCHLORIDE SCH: 1 INJECTION, SOLUTION INTRAVENOUS at 07:21

## 2022-03-29 RX ADMIN — CARBIDOPA AND LEVODOPA SCH EACH: 25; 250 TABLET ORAL at 09:04

## 2022-03-29 RX ADMIN — PIPERACILLIN SODIUM,TAZOBACTAM SODIUM SCH MLS/HR: 3; .375 INJECTION, POWDER, FOR SOLUTION INTRAVENOUS at 18:22

## 2022-03-29 RX ADMIN — PIPERACILLIN SODIUM,TAZOBACTAM SODIUM SCH MLS/HR: 3; .375 INJECTION, POWDER, FOR SOLUTION INTRAVENOUS at 01:39

## 2022-03-29 RX ADMIN — PIPERACILLIN SODIUM,TAZOBACTAM SODIUM SCH MLS/HR: 3; .375 INJECTION, POWDER, FOR SOLUTION INTRAVENOUS at 09:04

## 2022-03-29 RX ADMIN — RIVAROXABAN SCH MG: 10 TABLET, FILM COATED ORAL at 09:04

## 2022-03-29 RX ADMIN — CARBIDOPA AND LEVODOPA SCH EACH: 25; 250 TABLET ORAL at 06:33

## 2022-03-29 RX ADMIN — Medication SCH ML: at 18:21

## 2022-03-29 RX ADMIN — PANTOPRAZOLE SODIUM SCH MG: 40 TABLET, DELAYED RELEASE ORAL at 09:03

## 2022-03-29 RX ADMIN — Medication SCH: at 07:22

## 2022-03-29 RX ADMIN — ESCITALOPRAM OXALATE SCH MG: 10 TABLET, FILM COATED ORAL at 09:02

## 2022-03-29 RX ADMIN — ALLOPURINOL SCH MG: 300 TABLET ORAL at 09:05

## 2022-03-29 RX ADMIN — Medication SCH: at 08:03

## 2022-03-29 RX ADMIN — OXYCODONE HYDROCHLORIDE AND ACETAMINOPHEN SCH MG: 500 TABLET ORAL at 09:03

## 2022-03-30 LAB
ANION GAP SERPL CALC-SCNC: 5 MMOL/L (ref 8–16)
BUN SERPL-MCNC: 50.2 MG/DL (ref 7–18)
CALCIUM SERPL-MCNC: 8.8 MG/DL (ref 8.5–10.1)
CHLORIDE SERPL-SCNC: 105 MMOL/L (ref 98–107)
CO2 SERPL-SCNC: 34 MMOL/L (ref 21–32)
CREAT SERPL-MCNC: 1.7 MG/DL (ref 0.55–1.3)
DEPRECATED RDW RBC AUTO: 20.7 % (ref 11.9–15.9)
GLUCOSE SERPL-MCNC: 188 MG/DL (ref 74–106)
HCT VFR BLD CALC: 26.1 % (ref 35.4–49)
HGB BLD-MCNC: 8.2 GM/DL (ref 11.7–16.9)
MAGNESIUM SERPL-MCNC: 2.5 MG/DL (ref 1.8–2.4)
MCH RBC QN AUTO: 25.6 PG (ref 25.7–33.7)
MCHC RBC AUTO-ENTMCNC: 31.5 G/DL (ref 32–35.9)
MCV RBC: 81.2 FL (ref 80–96)
PLATELET # BLD AUTO: 182 10^3/UL (ref 134–434)
PMV BLD: 12 FL (ref 7.5–11.1)
RBC # BLD AUTO: 3.21 M/MM3 (ref 4–5.6)
SODIUM SERPL-SCNC: 144 MMOL/L (ref 136–145)
WBC # BLD AUTO: 10.7 K/MM3 (ref 4–10)

## 2022-03-30 RX ADMIN — INSULIN ASPART SCH UNITS: 100 INJECTION, SOLUTION INTRAVENOUS; SUBCUTANEOUS at 16:54

## 2022-03-30 RX ADMIN — FOLIC ACID SCH MG: 1 TABLET ORAL at 09:28

## 2022-03-30 RX ADMIN — PIPERACILLIN SODIUM,TAZOBACTAM SODIUM SCH MLS/HR: 3; .375 INJECTION, POWDER, FOR SOLUTION INTRAVENOUS at 09:37

## 2022-03-30 RX ADMIN — ALLOPURINOL SCH MG: 300 TABLET ORAL at 09:37

## 2022-03-30 RX ADMIN — QUETIAPINE FUMARATE SCH MG: 100 TABLET ORAL at 22:23

## 2022-03-30 RX ADMIN — PANTOPRAZOLE SODIUM SCH MG: 40 TABLET, DELAYED RELEASE ORAL at 09:28

## 2022-03-30 RX ADMIN — PIPERACILLIN SODIUM,TAZOBACTAM SODIUM SCH MLS/HR: 3; .375 INJECTION, POWDER, FOR SOLUTION INTRAVENOUS at 17:00

## 2022-03-30 RX ADMIN — CARBIDOPA AND LEVODOPA SCH EACH: 25; 250 TABLET ORAL at 06:23

## 2022-03-30 RX ADMIN — Medication SCH ML: at 16:41

## 2022-03-30 RX ADMIN — ESCITALOPRAM OXALATE SCH MG: 10 TABLET, FILM COATED ORAL at 09:27

## 2022-03-30 RX ADMIN — CARBIDOPA AND LEVODOPA SCH EACH: 25; 250 TABLET ORAL at 14:21

## 2022-03-30 RX ADMIN — OXYCODONE HYDROCHLORIDE AND ACETAMINOPHEN SCH MG: 500 TABLET ORAL at 09:28

## 2022-03-30 RX ADMIN — MIDODRINE HYDROCHLORIDE SCH MG: 5 TABLET ORAL at 17:16

## 2022-03-30 RX ADMIN — INSULIN ASPART SCH: 100 INJECTION, SOLUTION INTRAVENOUS; SUBCUTANEOUS at 11:31

## 2022-03-30 RX ADMIN — MIDODRINE HYDROCHLORIDE SCH MG: 5 TABLET ORAL at 09:27

## 2022-03-30 RX ADMIN — ATORVASTATIN CALCIUM SCH MG: 20 TABLET, FILM COATED ORAL at 22:23

## 2022-03-30 RX ADMIN — COLLAGENASE SANTYL SCH APPLIC: 250 OINTMENT TOPICAL at 14:21

## 2022-03-30 RX ADMIN — INSULIN ASPART SCH UNITS: 100 INJECTION, SOLUTION INTRAVENOUS; SUBCUTANEOUS at 22:30

## 2022-03-30 RX ADMIN — RIVAROXABAN SCH MG: 10 TABLET, FILM COATED ORAL at 09:26

## 2022-03-30 RX ADMIN — Medication SCH ML: at 08:42

## 2022-03-30 RX ADMIN — INSULIN ASPART SCH: 100 INJECTION, SOLUTION INTRAVENOUS; SUBCUTANEOUS at 06:24

## 2022-03-30 RX ADMIN — CARBIDOPA AND LEVODOPA SCH EACH: 25; 250 TABLET ORAL at 09:28

## 2022-03-30 RX ADMIN — CARBIDOPA AND LEVODOPA SCH EACH: 25; 250 TABLET ORAL at 17:16

## 2022-03-30 RX ADMIN — PIPERACILLIN SODIUM,TAZOBACTAM SODIUM SCH MLS/HR: 3; .375 INJECTION, POWDER, FOR SOLUTION INTRAVENOUS at 02:05

## 2022-03-31 RX ADMIN — Medication SCH ML: at 17:06

## 2022-03-31 RX ADMIN — RIVAROXABAN SCH MG: 10 TABLET, FILM COATED ORAL at 10:42

## 2022-03-31 RX ADMIN — QUETIAPINE FUMARATE SCH MG: 100 TABLET ORAL at 21:23

## 2022-03-31 RX ADMIN — MIDODRINE HYDROCHLORIDE SCH MG: 5 TABLET ORAL at 21:33

## 2022-03-31 RX ADMIN — FOLIC ACID SCH MG: 1 TABLET ORAL at 10:44

## 2022-03-31 RX ADMIN — INSULIN ASPART SCH: 100 INJECTION, SOLUTION INTRAVENOUS; SUBCUTANEOUS at 07:13

## 2022-03-31 RX ADMIN — Medication SCH ML: at 08:51

## 2022-03-31 RX ADMIN — MIDODRINE HYDROCHLORIDE SCH MG: 5 TABLET ORAL at 10:43

## 2022-03-31 RX ADMIN — INSULIN ASPART SCH UNITS: 100 INJECTION, SOLUTION INTRAVENOUS; SUBCUTANEOUS at 21:22

## 2022-03-31 RX ADMIN — ALLOPURINOL SCH MG: 300 TABLET ORAL at 10:44

## 2022-03-31 RX ADMIN — ATORVASTATIN CALCIUM SCH MG: 20 TABLET, FILM COATED ORAL at 21:23

## 2022-03-31 RX ADMIN — CARBIDOPA AND LEVODOPA SCH EACH: 25; 250 TABLET ORAL at 10:43

## 2022-03-31 RX ADMIN — CARBIDOPA AND LEVODOPA SCH EACH: 25; 250 TABLET ORAL at 07:09

## 2022-03-31 RX ADMIN — ESCITALOPRAM OXALATE SCH MG: 10 TABLET, FILM COATED ORAL at 10:43

## 2022-03-31 RX ADMIN — INSULIN ASPART SCH UNITS: 100 INJECTION, SOLUTION INTRAVENOUS; SUBCUTANEOUS at 17:06

## 2022-03-31 RX ADMIN — PIPERACILLIN SODIUM,TAZOBACTAM SODIUM SCH MLS/HR: 3; .375 INJECTION, POWDER, FOR SOLUTION INTRAVENOUS at 02:27

## 2022-03-31 RX ADMIN — OXYCODONE HYDROCHLORIDE AND ACETAMINOPHEN SCH MG: 500 TABLET ORAL at 10:43

## 2022-03-31 RX ADMIN — COLLAGENASE SANTYL SCH APPLIC: 250 OINTMENT TOPICAL at 12:37

## 2022-03-31 RX ADMIN — INSULIN ASPART SCH: 100 INJECTION, SOLUTION INTRAVENOUS; SUBCUTANEOUS at 10:45

## 2022-03-31 RX ADMIN — PIPERACILLIN SODIUM,TAZOBACTAM SODIUM SCH MLS/HR: 3; .375 INJECTION, POWDER, FOR SOLUTION INTRAVENOUS at 17:06

## 2022-03-31 RX ADMIN — PANTOPRAZOLE SODIUM SCH MG: 40 TABLET, DELAYED RELEASE ORAL at 10:44

## 2022-03-31 RX ADMIN — CARBIDOPA AND LEVODOPA SCH EACH: 25; 250 TABLET ORAL at 13:06

## 2022-03-31 RX ADMIN — PIPERACILLIN SODIUM,TAZOBACTAM SODIUM SCH MLS/HR: 3; .375 INJECTION, POWDER, FOR SOLUTION INTRAVENOUS at 10:44

## 2022-03-31 RX ADMIN — CARBIDOPA AND LEVODOPA SCH EACH: 25; 250 TABLET ORAL at 17:10

## 2022-04-01 VITALS — HEART RATE: 83 BPM | SYSTOLIC BLOOD PRESSURE: 127 MMHG | DIASTOLIC BLOOD PRESSURE: 74 MMHG

## 2022-04-01 VITALS — TEMPERATURE: 97.8 F

## 2022-04-01 LAB
ALBUMIN SERPL-MCNC: 2.3 G/DL (ref 3.4–5)
ALP SERPL-CCNC: 127 U/L (ref 45–117)
ALT SERPL-CCNC: 21 U/L (ref 13–61)
ANION GAP SERPL CALC-SCNC: 7 MMOL/L (ref 8–16)
AST SERPL-CCNC: 30 U/L (ref 15–37)
BILIRUB SERPL-MCNC: 1.1 MG/DL (ref 0.2–1)
BUN SERPL-MCNC: 65.7 MG/DL (ref 7–18)
CALCIUM SERPL-MCNC: 8.3 MG/DL (ref 8.5–10.1)
CHLORIDE SERPL-SCNC: 105 MMOL/L (ref 98–107)
CO2 SERPL-SCNC: 34 MMOL/L (ref 21–32)
CREAT SERPL-MCNC: 2.4 MG/DL (ref 0.55–1.3)
GLUCOSE SERPL-MCNC: 202 MG/DL (ref 74–106)
PROT SERPL-MCNC: 6 G/DL (ref 6.4–8.2)
SODIUM SERPL-SCNC: 147 MMOL/L (ref 136–145)

## 2022-04-01 RX ADMIN — ALLOPURINOL SCH MG: 300 TABLET ORAL at 10:30

## 2022-04-01 RX ADMIN — INSULIN ASPART SCH: 100 INJECTION, SOLUTION INTRAVENOUS; SUBCUTANEOUS at 11:03

## 2022-04-01 RX ADMIN — CARBIDOPA AND LEVODOPA SCH EACH: 25; 250 TABLET ORAL at 10:30

## 2022-04-01 RX ADMIN — PIPERACILLIN SODIUM,TAZOBACTAM SODIUM SCH MLS/HR: 3; .375 INJECTION, POWDER, FOR SOLUTION INTRAVENOUS at 10:28

## 2022-04-01 RX ADMIN — PIPERACILLIN SODIUM,TAZOBACTAM SODIUM SCH MLS/HR: 3; .375 INJECTION, POWDER, FOR SOLUTION INTRAVENOUS at 01:00

## 2022-04-01 RX ADMIN — INSULIN ASPART SCH UNITS: 100 INJECTION, SOLUTION INTRAVENOUS; SUBCUTANEOUS at 16:33

## 2022-04-01 RX ADMIN — CARBIDOPA AND LEVODOPA SCH EACH: 25; 250 TABLET ORAL at 17:04

## 2022-04-01 RX ADMIN — MIDODRINE HYDROCHLORIDE SCH MG: 5 TABLET ORAL at 17:04

## 2022-04-01 RX ADMIN — RIVAROXABAN SCH MG: 10 TABLET, FILM COATED ORAL at 10:29

## 2022-04-01 RX ADMIN — COLLAGENASE SANTYL SCH APPLIC: 250 OINTMENT TOPICAL at 10:40

## 2022-04-01 RX ADMIN — INSULIN ASPART SCH: 100 INJECTION, SOLUTION INTRAVENOUS; SUBCUTANEOUS at 06:17

## 2022-04-01 RX ADMIN — OXYCODONE HYDROCHLORIDE AND ACETAMINOPHEN SCH MG: 500 TABLET ORAL at 10:29

## 2022-04-01 RX ADMIN — Medication SCH ML: at 17:04

## 2022-04-01 RX ADMIN — CARBIDOPA AND LEVODOPA SCH EACH: 25; 250 TABLET ORAL at 13:29

## 2022-04-01 RX ADMIN — PANTOPRAZOLE SODIUM SCH MG: 40 TABLET, DELAYED RELEASE ORAL at 10:29

## 2022-04-01 RX ADMIN — Medication SCH ML: at 10:29

## 2022-04-01 RX ADMIN — MIDODRINE HYDROCHLORIDE SCH MG: 5 TABLET ORAL at 10:29

## 2022-04-01 RX ADMIN — CARBIDOPA AND LEVODOPA SCH EACH: 25; 250 TABLET ORAL at 05:42

## 2022-04-01 RX ADMIN — ESCITALOPRAM OXALATE SCH MG: 10 TABLET, FILM COATED ORAL at 10:29

## 2022-04-01 RX ADMIN — FOLIC ACID SCH MG: 1 TABLET ORAL at 10:29

## 2022-04-04 ENCOUNTER — HOSPITAL ENCOUNTER (INPATIENT)
Dept: HOSPITAL 74 - JER | Age: 77
LOS: 7 days | Discharge: NURSING, REHAB OR LONG-TERM CARE | DRG: 682 | End: 2022-04-11
Attending: FAMILY MEDICINE | Admitting: HOSPITALIST
Payer: COMMERCIAL

## 2022-04-04 VITALS — BODY MASS INDEX: 22.6 KG/M2

## 2022-04-04 DIAGNOSIS — J44.9: ICD-10-CM

## 2022-04-04 DIAGNOSIS — N39.0: ICD-10-CM

## 2022-04-04 DIAGNOSIS — D64.9: ICD-10-CM

## 2022-04-04 DIAGNOSIS — E86.0: ICD-10-CM

## 2022-04-04 DIAGNOSIS — G20: ICD-10-CM

## 2022-04-04 DIAGNOSIS — I50.22: ICD-10-CM

## 2022-04-04 DIAGNOSIS — Z79.4: ICD-10-CM

## 2022-04-04 DIAGNOSIS — E11.65: ICD-10-CM

## 2022-04-04 DIAGNOSIS — L89.153: ICD-10-CM

## 2022-04-04 DIAGNOSIS — I13.0: ICD-10-CM

## 2022-04-04 DIAGNOSIS — I48.91: ICD-10-CM

## 2022-04-04 DIAGNOSIS — N18.9: ICD-10-CM

## 2022-04-04 DIAGNOSIS — E87.8: ICD-10-CM

## 2022-04-04 DIAGNOSIS — R77.8: ICD-10-CM

## 2022-04-04 DIAGNOSIS — K21.9: ICD-10-CM

## 2022-04-04 DIAGNOSIS — N17.9: Primary | ICD-10-CM

## 2022-04-04 DIAGNOSIS — F32.A: ICD-10-CM

## 2022-04-04 DIAGNOSIS — E43: ICD-10-CM

## 2022-04-04 DIAGNOSIS — E87.0: ICD-10-CM

## 2022-04-04 DIAGNOSIS — Z95.0: ICD-10-CM

## 2022-04-04 DIAGNOSIS — E11.22: ICD-10-CM

## 2022-04-04 DIAGNOSIS — I25.10: ICD-10-CM

## 2022-04-04 DIAGNOSIS — M10.9: ICD-10-CM

## 2022-04-04 DIAGNOSIS — Z86.16: ICD-10-CM

## 2022-04-04 DIAGNOSIS — N40.0: ICD-10-CM

## 2022-04-04 LAB
ALBUMIN SERPL-MCNC: 2.3 G/DL (ref 3.4–5)
ALBUMIN SERPL-MCNC: 2.5 G/DL (ref 3.4–5)
ALP SERPL-CCNC: 117 U/L (ref 45–117)
ALP SERPL-CCNC: 129 U/L (ref 45–117)
ALT SERPL-CCNC: 27 U/L (ref 13–61)
ALT SERPL-CCNC: 32 U/L (ref 13–61)
ANION GAP SERPL CALC-SCNC: 10 MMOL/L (ref 8–16)
ANION GAP SERPL CALC-SCNC: 7 MMOL/L (ref 8–16)
ANISOCYTOSIS BLD QL: (no result)
APPEARANCE UR: CLEAR
APTT BLD: 38.8 SECONDS (ref 25.2–36.5)
AST SERPL-CCNC: 18 U/L (ref 15–37)
AST SERPL-CCNC: 30 U/L (ref 15–37)
BACTERIA # UR AUTO: 6 /UL (ref 0–1359)
BASOPHILS # BLD: 0.7 % (ref 0–2)
BILIRUB SERPL-MCNC: 0.9 MG/DL (ref 0.2–1)
BILIRUB SERPL-MCNC: 1 MG/DL (ref 0.2–1)
BILIRUB UR STRIP.AUTO-MCNC: NEGATIVE MG/DL
BNP SERPL-MCNC: (no result) PG/ML (ref 5–450)
BUN SERPL-MCNC: 80.3 MG/DL (ref 7–18)
BUN SERPL-MCNC: 80.7 MG/DL (ref 7–18)
CALCIUM SERPL-MCNC: 8.2 MG/DL (ref 8.5–10.1)
CALCIUM SERPL-MCNC: 8.2 MG/DL (ref 8.5–10.1)
CASTS URNS QL MICRO: 1 /UL (ref 0–3.1)
CHLORIDE SERPL-SCNC: 112 MMOL/L (ref 98–107)
CHLORIDE SERPL-SCNC: 114 MMOL/L (ref 98–107)
CO2 SERPL-SCNC: 30 MMOL/L (ref 21–32)
CO2 SERPL-SCNC: 33 MMOL/L (ref 21–32)
COLOR UR: YELLOW
CREAT SERPL-MCNC: 3.2 MG/DL (ref 0.55–1.3)
CREAT SERPL-MCNC: 3.2 MG/DL (ref 0.55–1.3)
DEPRECATED RDW RBC AUTO: 21.7 % (ref 11.9–15.9)
EOSINOPHIL # BLD: 2.1 % (ref 0–4.5)
EPITH CASTS URNS QL MICRO: 13 /UL (ref 0–25.1)
GLUCOSE SERPL-MCNC: 198 MG/DL (ref 74–106)
GLUCOSE SERPL-MCNC: 199 MG/DL (ref 74–106)
HCT VFR BLD CALC: 31.5 % (ref 35.4–49)
HGB BLD-MCNC: 9.6 GM/DL (ref 11.7–16.9)
INR BLD: 2.86 (ref 0.83–1.09)
KETONES UR QL STRIP: NEGATIVE
LEUKOCYTE ESTERASE UR QL STRIP.AUTO: (no result)
LYMPHOCYTES # BLD: 10.8 % (ref 8–40)
MACROCYTES BLD QL: 0
MCH RBC QN AUTO: 25.6 PG (ref 25.7–33.7)
MCHC RBC AUTO-ENTMCNC: 30.5 G/DL (ref 32–35.9)
MCV RBC: 83.9 FL (ref 80–96)
MONOCYTES # BLD AUTO: 7.6 % (ref 3.8–10.2)
NEUTROPHILS # BLD: 78.8 % (ref 42.8–82.8)
NITRITE UR QL STRIP: NEGATIVE
PH UR: 5 [PH] (ref 5–8)
PLATELET # BLD AUTO: 256 10^3/UL (ref 134–434)
PMV BLD: 11.8 FL (ref 7.5–11.1)
PROT SERPL-MCNC: 5.9 G/DL (ref 6.4–8.2)
PROT SERPL-MCNC: 6.5 G/DL (ref 6.4–8.2)
PROT UR QL STRIP: (no result)
PROT UR QL STRIP: NEGATIVE
PT PNL PPP: 33.2 SEC (ref 9.7–13)
RBC # BLD AUTO: 3.76 M/MM3 (ref 4–5.6)
SODIUM SERPL-SCNC: 152 MMOL/L (ref 136–145)
SODIUM SERPL-SCNC: 154 MMOL/L (ref 136–145)
SP GR UR: 1.01 (ref 1.01–1.03)
UROBILINOGEN UR STRIP-MCNC: 0.2 MG/DL (ref 0.2–1)
WBC # BLD AUTO: 13.2 K/MM3 (ref 4–10)
WBC # UR AUTO: 47 /UL (ref 0–25.8)

## 2022-04-04 PROCEDURE — U0003 INFECTIOUS AGENT DETECTION BY NUCLEIC ACID (DNA OR RNA); SEVERE ACUTE RESPIRATORY SYNDROME CORONAVIRUS 2 (SARS-COV-2) (CORONAVIRUS DISEASE [COVID-19]), AMPLIFIED PROBE TECHNIQUE, MAKING USE OF HIGH THROUGHPUT TECHNOLOGIES AS DESCRIBED BY CMS-2020-01-R: HCPCS

## 2022-04-04 PROCEDURE — U0005 INFEC AGEN DETEC AMPLI PROBE: HCPCS

## 2022-04-05 LAB
ANION GAP SERPL CALC-SCNC: 4 MMOL/L (ref 8–16)
BASOPHILS # BLD: 0.8 % (ref 0–2)
BUN SERPL-MCNC: 83.6 MG/DL (ref 7–18)
CALCIUM SERPL-MCNC: 8.9 MG/DL (ref 8.5–10.1)
CHLORIDE SERPL-SCNC: 114 MMOL/L (ref 98–107)
CO2 SERPL-SCNC: 36 MMOL/L (ref 21–32)
CREAT SERPL-MCNC: 3 MG/DL (ref 0.55–1.3)
DEPRECATED RDW RBC AUTO: 22.2 % (ref 11.9–15.9)
EOSINOPHIL # BLD: 3.1 % (ref 0–4.5)
GLUCOSE SERPL-MCNC: 179 MG/DL (ref 74–106)
HCT VFR BLD CALC: 30.3 % (ref 35.4–49)
HGB BLD-MCNC: 9.1 GM/DL (ref 11.7–16.9)
LYMPHOCYTES # BLD: 11.2 % (ref 8–40)
MAGNESIUM SERPL-MCNC: 2.9 MG/DL (ref 1.8–2.4)
MCH RBC QN AUTO: 25.1 PG (ref 25.7–33.7)
MCHC RBC AUTO-ENTMCNC: 30 G/DL (ref 32–35.9)
MCV RBC: 83.6 FL (ref 80–96)
MONOCYTES # BLD AUTO: 7.3 % (ref 3.8–10.2)
NEUTROPHILS # BLD: 77.6 % (ref 42.8–82.8)
PHOSPHATE SERPL-MCNC: 4.4 MG/DL (ref 2.5–4.9)
PLATELET # BLD AUTO: 228 10^3/UL (ref 134–434)
PMV BLD: 12.4 FL (ref 7.5–11.1)
RBC # BLD AUTO: 3.63 M/MM3 (ref 4–5.6)
SODIUM SERPL-SCNC: 154 MMOL/L (ref 136–145)
WBC # BLD AUTO: 13.4 K/MM3 (ref 4–10)

## 2022-04-05 RX ADMIN — IPRATROPIUM BROMIDE AND ALBUTEROL SULFATE SCH AMP: .5; 3 SOLUTION RESPIRATORY (INHALATION) at 16:10

## 2022-04-05 RX ADMIN — MIDODRINE HYDROCHLORIDE SCH MG: 5 TABLET ORAL at 21:18

## 2022-04-05 RX ADMIN — ATORVASTATIN CALCIUM SCH MG: 20 TABLET, FILM COATED ORAL at 21:18

## 2022-04-05 RX ADMIN — ESCITALOPRAM OXALATE SCH MG: 10 TABLET, FILM COATED ORAL at 10:42

## 2022-04-05 RX ADMIN — MIDODRINE HYDROCHLORIDE SCH MG: 5 TABLET ORAL at 10:42

## 2022-04-05 RX ADMIN — FERROUS SULFATE TAB EC 324 MG (65 MG FE EQUIVALENT) SCH MG: 324 (65 FE) TABLET DELAYED RESPONSE at 10:43

## 2022-04-05 RX ADMIN — CARBIDOPA AND LEVODOPA SCH EACH: 25; 250 TABLET ORAL at 19:09

## 2022-04-05 RX ADMIN — CARBIDOPA AND LEVODOPA SCH EACH: 25; 250 TABLET ORAL at 10:42

## 2022-04-05 RX ADMIN — IPRATROPIUM BROMIDE AND ALBUTEROL SULFATE SCH AMP: .5; 3 SOLUTION RESPIRATORY (INHALATION) at 20:40

## 2022-04-05 RX ADMIN — CEFTRIAXONE SCH MLS/HR: 1 INJECTION, POWDER, FOR SOLUTION INTRAMUSCULAR; INTRAVENOUS at 21:18

## 2022-04-05 RX ADMIN — FERROUS SULFATE TAB EC 324 MG (65 MG FE EQUIVALENT) SCH MG: 324 (65 FE) TABLET DELAYED RESPONSE at 21:18

## 2022-04-05 RX ADMIN — IPRATROPIUM BROMIDE AND ALBUTEROL SULFATE SCH AMP: .5; 3 SOLUTION RESPIRATORY (INHALATION) at 11:30

## 2022-04-05 RX ADMIN — POTASSIUM CHLORIDE SCH MLS/HR: 149 INJECTION, SOLUTION, CONCENTRATE INTRAVENOUS at 15:48

## 2022-04-05 RX ADMIN — CARBIDOPA AND LEVODOPA SCH EACH: 25; 250 TABLET ORAL at 06:28

## 2022-04-05 RX ADMIN — INSULIN ASPART SCH UNITS: 100 INJECTION, SOLUTION INTRAVENOUS; SUBCUTANEOUS at 22:32

## 2022-04-05 RX ADMIN — ALLOPURINOL SCH MG: 300 TABLET ORAL at 10:44

## 2022-04-05 RX ADMIN — CARBIDOPA AND LEVODOPA SCH EACH: 25; 250 TABLET ORAL at 13:42

## 2022-04-06 LAB
ALBUMIN SERPL-MCNC: 2 G/DL (ref 3.4–5)
ALP SERPL-CCNC: 93 U/L (ref 45–117)
ALT SERPL-CCNC: 10 U/L (ref 13–61)
ANION GAP SERPL CALC-SCNC: 3 MMOL/L (ref 8–16)
AST SERPL-CCNC: 14 U/L (ref 15–37)
BASOPHILS # BLD: 1.3 % (ref 0–2)
BILIRUB SERPL-MCNC: 0.7 MG/DL (ref 0.2–1)
BUN SERPL-MCNC: 73.4 MG/DL (ref 7–18)
CALCIUM SERPL-MCNC: 7.7 MG/DL (ref 8.5–10.1)
CHLORIDE SERPL-SCNC: 116 MMOL/L (ref 98–107)
CO2 SERPL-SCNC: 35 MMOL/L (ref 21–32)
CREAT SERPL-MCNC: 2.8 MG/DL (ref 0.55–1.3)
DEPRECATED RDW RBC AUTO: 21.9 % (ref 11.9–15.9)
DEPRECATED RDW RBC AUTO: 22.8 % (ref 11.9–15.9)
EOSINOPHIL # BLD: 3.4 % (ref 0–4.5)
GLUCOSE SERPL-MCNC: 141 MG/DL (ref 74–106)
HCT VFR BLD CALC: 25.2 % (ref 35.4–49)
HCT VFR BLD CALC: 27.8 % (ref 35.4–49)
HGB BLD-MCNC: 7.7 GM/DL (ref 11.7–16.9)
HGB BLD-MCNC: 8.4 GM/DL (ref 11.7–16.9)
IRON SERPL-MCNC: 34 UG/DL (ref 50–175)
LYMPHOCYTES # BLD: 13.7 % (ref 8–40)
MCH RBC QN AUTO: 25.6 PG (ref 25.7–33.7)
MCH RBC QN AUTO: 26 PG (ref 25.7–33.7)
MCHC RBC AUTO-ENTMCNC: 30.3 G/DL (ref 32–35.9)
MCHC RBC AUTO-ENTMCNC: 30.7 G/DL (ref 32–35.9)
MCV RBC: 84.5 FL (ref 80–96)
MCV RBC: 84.5 FL (ref 80–96)
MONOCYTES # BLD AUTO: 7.9 % (ref 3.8–10.2)
NEUTROPHILS # BLD: 73.7 % (ref 42.8–82.8)
PLATELET # BLD AUTO: 164 10^3/UL (ref 134–434)
PLATELET # BLD AUTO: 170 10^3/UL (ref 134–434)
PMV BLD: 11.3 FL (ref 7.5–11.1)
PMV BLD: 11.6 FL (ref 7.5–11.1)
PROT SERPL-MCNC: 5.1 G/DL (ref 6.4–8.2)
RBC # BLD AUTO: 2.99 M/MM3 (ref 4–5.6)
RBC # BLD AUTO: 3.29 M/MM3 (ref 4–5.6)
SODIUM SERPL-SCNC: 153 MMOL/L (ref 136–145)
TIBC SERPL-MCNC: 149 UG/DL (ref 250–450)
WBC # BLD AUTO: 8.3 K/MM3 (ref 4–10)
WBC # BLD AUTO: 9 K/MM3 (ref 4–10)

## 2022-04-06 RX ADMIN — CARBIDOPA AND LEVODOPA SCH EACH: 25; 250 TABLET ORAL at 10:06

## 2022-04-06 RX ADMIN — MIDODRINE HYDROCHLORIDE SCH MG: 5 TABLET ORAL at 21:05

## 2022-04-06 RX ADMIN — ATORVASTATIN CALCIUM SCH MG: 20 TABLET, FILM COATED ORAL at 21:05

## 2022-04-06 RX ADMIN — CEFTRIAXONE SCH MLS/HR: 1 INJECTION, POWDER, FOR SOLUTION INTRAMUSCULAR; INTRAVENOUS at 21:05

## 2022-04-06 RX ADMIN — PANTOPRAZOLE SODIUM SCH MG: 40 INJECTION, POWDER, FOR SOLUTION INTRAVENOUS at 21:05

## 2022-04-06 RX ADMIN — ESCITALOPRAM OXALATE SCH MG: 10 TABLET, FILM COATED ORAL at 10:05

## 2022-04-06 RX ADMIN — INSULIN ASPART SCH UNITS: 100 INJECTION, SOLUTION INTRAVENOUS; SUBCUTANEOUS at 18:27

## 2022-04-06 RX ADMIN — INSULIN ASPART SCH UNITS: 100 INJECTION, SOLUTION INTRAVENOUS; SUBCUTANEOUS at 22:02

## 2022-04-06 RX ADMIN — IPRATROPIUM BROMIDE AND ALBUTEROL SULFATE SCH AMP: .5; 3 SOLUTION RESPIRATORY (INHALATION) at 20:05

## 2022-04-06 RX ADMIN — Medication SCH ML: at 18:27

## 2022-04-06 RX ADMIN — FERROUS SULFATE TAB EC 324 MG (65 MG FE EQUIVALENT) SCH MG: 324 (65 FE) TABLET DELAYED RESPONSE at 10:06

## 2022-04-06 RX ADMIN — MIDODRINE HYDROCHLORIDE SCH MG: 5 TABLET ORAL at 10:06

## 2022-04-06 RX ADMIN — IPRATROPIUM BROMIDE AND ALBUTEROL SULFATE SCH AMP: .5; 3 SOLUTION RESPIRATORY (INHALATION) at 12:00

## 2022-04-06 RX ADMIN — POTASSIUM CHLORIDE SCH MLS/HR: 149 INJECTION, SOLUTION, CONCENTRATE INTRAVENOUS at 06:01

## 2022-04-06 RX ADMIN — PANTOPRAZOLE SODIUM SCH MG: 40 INJECTION, POWDER, FOR SOLUTION INTRAVENOUS at 11:09

## 2022-04-06 RX ADMIN — IPRATROPIUM BROMIDE AND ALBUTEROL SULFATE SCH AMP: .5; 3 SOLUTION RESPIRATORY (INHALATION) at 15:54

## 2022-04-06 RX ADMIN — POTASSIUM CHLORIDE SCH MLS/HR: 149 INJECTION, SOLUTION, CONCENTRATE INTRAVENOUS at 13:00

## 2022-04-06 RX ADMIN — IPRATROPIUM BROMIDE AND ALBUTEROL SULFATE SCH AMP: .5; 3 SOLUTION RESPIRATORY (INHALATION) at 08:36

## 2022-04-06 RX ADMIN — POTASSIUM CHLORIDE SCH MLS/HR: 149 INJECTION, SOLUTION, CONCENTRATE INTRAVENOUS at 23:39

## 2022-04-06 RX ADMIN — FERROUS SULFATE TAB EC 324 MG (65 MG FE EQUIVALENT) SCH MG: 324 (65 FE) TABLET DELAYED RESPONSE at 21:05

## 2022-04-06 RX ADMIN — CARBIDOPA AND LEVODOPA SCH EACH: 25; 250 TABLET ORAL at 05:52

## 2022-04-06 RX ADMIN — CARBIDOPA AND LEVODOPA SCH EACH: 25; 250 TABLET ORAL at 15:00

## 2022-04-06 RX ADMIN — ALLOPURINOL SCH MG: 300 TABLET ORAL at 10:05

## 2022-04-06 RX ADMIN — CARBIDOPA AND LEVODOPA SCH EACH: 25; 250 TABLET ORAL at 18:27

## 2022-04-07 LAB
ALBUMIN SERPL-MCNC: 2.1 G/DL (ref 3.4–5)
ALP SERPL-CCNC: 97 U/L (ref 45–117)
ALT SERPL-CCNC: 13 U/L (ref 13–61)
ANION GAP SERPL CALC-SCNC: 4 MMOL/L (ref 8–16)
AST SERPL-CCNC: 14 U/L (ref 15–37)
BILIRUB SERPL-MCNC: 0.6 MG/DL (ref 0.2–1)
BUN SERPL-MCNC: 60.6 MG/DL (ref 7–18)
CALCIUM SERPL-MCNC: 8 MG/DL (ref 8.5–10.1)
CHLORIDE SERPL-SCNC: 111 MMOL/L (ref 98–107)
CO2 SERPL-SCNC: 34 MMOL/L (ref 21–32)
CREAT SERPL-MCNC: 2.6 MG/DL (ref 0.55–1.3)
DEPRECATED RDW RBC AUTO: 22.4 % (ref 11.9–15.9)
GLUCOSE SERPL-MCNC: 181 MG/DL (ref 74–106)
HCT VFR BLD CALC: 27.4 % (ref 35.4–49)
HGB BLD-MCNC: 8.3 GM/DL (ref 11.7–16.9)
MCH RBC QN AUTO: 25.6 PG (ref 25.7–33.7)
MCHC RBC AUTO-ENTMCNC: 30.2 G/DL (ref 32–35.9)
MCV RBC: 84.8 FL (ref 80–96)
PLATELET # BLD AUTO: 163 10^3/UL (ref 134–434)
PMV BLD: 11.6 FL (ref 7.5–11.1)
PROT SERPL-MCNC: 5.3 G/DL (ref 6.4–8.2)
RBC # BLD AUTO: 3.23 M/MM3 (ref 4–5.6)
SODIUM SERPL-SCNC: 149 MMOL/L (ref 136–145)
WBC # BLD AUTO: 7 K/MM3 (ref 4–10)

## 2022-04-07 RX ADMIN — FERROUS SULFATE TAB EC 324 MG (65 MG FE EQUIVALENT) SCH MG: 324 (65 FE) TABLET DELAYED RESPONSE at 09:57

## 2022-04-07 RX ADMIN — POTASSIUM CHLORIDE SCH MLS/HR: 149 INJECTION, SOLUTION, CONCENTRATE INTRAVENOUS at 11:20

## 2022-04-07 RX ADMIN — IPRATROPIUM BROMIDE AND ALBUTEROL SULFATE SCH AMP: .5; 3 SOLUTION RESPIRATORY (INHALATION) at 16:50

## 2022-04-07 RX ADMIN — Medication SCH ML: at 17:36

## 2022-04-07 RX ADMIN — INSULIN ASPART SCH UNITS: 100 INJECTION, SOLUTION INTRAVENOUS; SUBCUTANEOUS at 22:02

## 2022-04-07 RX ADMIN — IPRATROPIUM BROMIDE AND ALBUTEROL SULFATE SCH AMP: .5; 3 SOLUTION RESPIRATORY (INHALATION) at 07:59

## 2022-04-07 RX ADMIN — MIDODRINE HYDROCHLORIDE SCH MG: 5 TABLET ORAL at 09:57

## 2022-04-07 RX ADMIN — PANTOPRAZOLE SODIUM SCH MG: 40 INJECTION, POWDER, FOR SOLUTION INTRAVENOUS at 09:57

## 2022-04-07 RX ADMIN — FERROUS SULFATE TAB EC 324 MG (65 MG FE EQUIVALENT) SCH MG: 324 (65 FE) TABLET DELAYED RESPONSE at 22:03

## 2022-04-07 RX ADMIN — INSULIN ASPART SCH UNITS: 100 INJECTION, SOLUTION INTRAVENOUS; SUBCUTANEOUS at 11:00

## 2022-04-07 RX ADMIN — IPRATROPIUM BROMIDE AND ALBUTEROL SULFATE SCH AMP: .5; 3 SOLUTION RESPIRATORY (INHALATION) at 20:20

## 2022-04-07 RX ADMIN — INSULIN ASPART SCH UNITS: 100 INJECTION, SOLUTION INTRAVENOUS; SUBCUTANEOUS at 16:50

## 2022-04-07 RX ADMIN — Medication SCH ML: at 09:49

## 2022-04-07 RX ADMIN — CARBIDOPA AND LEVODOPA SCH EACH: 25; 250 TABLET ORAL at 09:57

## 2022-04-07 RX ADMIN — OXYCODONE HYDROCHLORIDE AND ACETAMINOPHEN SCH MG: 500 TABLET ORAL at 09:55

## 2022-04-07 RX ADMIN — INSULIN ASPART SCH UNITS: 100 INJECTION, SOLUTION INTRAVENOUS; SUBCUTANEOUS at 06:46

## 2022-04-07 RX ADMIN — CARBIDOPA AND LEVODOPA SCH EACH: 25; 250 TABLET ORAL at 17:36

## 2022-04-07 RX ADMIN — ATORVASTATIN CALCIUM SCH MG: 20 TABLET, FILM COATED ORAL at 22:03

## 2022-04-07 RX ADMIN — CARBIDOPA AND LEVODOPA SCH EACH: 25; 250 TABLET ORAL at 14:00

## 2022-04-07 RX ADMIN — MULTIVITAMIN TABLET SCH TAB: TABLET at 09:55

## 2022-04-07 RX ADMIN — MIDODRINE HYDROCHLORIDE SCH MG: 5 TABLET ORAL at 22:03

## 2022-04-07 RX ADMIN — ALLOPURINOL SCH MG: 300 TABLET ORAL at 09:55

## 2022-04-07 RX ADMIN — IPRATROPIUM BROMIDE AND ALBUTEROL SULFATE SCH AMP: .5; 3 SOLUTION RESPIRATORY (INHALATION) at 12:57

## 2022-04-07 RX ADMIN — CARBIDOPA AND LEVODOPA SCH EACH: 25; 250 TABLET ORAL at 06:46

## 2022-04-07 RX ADMIN — ESCITALOPRAM OXALATE SCH MG: 10 TABLET, FILM COATED ORAL at 09:56

## 2022-04-08 LAB
ALBUMIN SERPL-MCNC: 2.5 G/DL (ref 3.4–5)
ALP SERPL-CCNC: 116 U/L (ref 45–117)
ALT SERPL-CCNC: 14 U/L (ref 13–61)
ANION GAP SERPL CALC-SCNC: 6 MMOL/L (ref 8–16)
AST SERPL-CCNC: 17 U/L (ref 15–37)
BILIRUB SERPL-MCNC: 0.6 MG/DL (ref 0.2–1)
BUN SERPL-MCNC: 66.4 MG/DL (ref 7–18)
CALCIUM SERPL-MCNC: 8.3 MG/DL (ref 8.5–10.1)
CHLORIDE SERPL-SCNC: 106 MMOL/L (ref 98–107)
CO2 SERPL-SCNC: 32 MMOL/L (ref 21–32)
CREAT SERPL-MCNC: 2.3 MG/DL (ref 0.55–1.3)
GLUCOSE SERPL-MCNC: 169 MG/DL (ref 74–106)
MAGNESIUM SERPL-MCNC: 2.6 MG/DL (ref 1.8–2.4)
PROT SERPL-MCNC: 6 G/DL (ref 6.4–8.2)
SODIUM SERPL-SCNC: 143 MMOL/L (ref 136–145)

## 2022-04-08 RX ADMIN — Medication SCH ML: at 17:30

## 2022-04-08 RX ADMIN — CARBIDOPA AND LEVODOPA SCH EACH: 25; 250 TABLET ORAL at 18:00

## 2022-04-08 RX ADMIN — MIDODRINE HYDROCHLORIDE SCH MG: 5 TABLET ORAL at 22:22

## 2022-04-08 RX ADMIN — FERROUS SULFATE TAB EC 324 MG (65 MG FE EQUIVALENT) SCH MG: 324 (65 FE) TABLET DELAYED RESPONSE at 09:33

## 2022-04-08 RX ADMIN — IPRATROPIUM BROMIDE AND ALBUTEROL SULFATE SCH AMP: .5; 3 SOLUTION RESPIRATORY (INHALATION) at 16:43

## 2022-04-08 RX ADMIN — Medication SCH ML: at 08:15

## 2022-04-08 RX ADMIN — INSULIN ASPART SCH UNITS: 100 INJECTION, SOLUTION INTRAVENOUS; SUBCUTANEOUS at 11:30

## 2022-04-08 RX ADMIN — OXYCODONE HYDROCHLORIDE AND ACETAMINOPHEN SCH MG: 500 TABLET ORAL at 09:32

## 2022-04-08 RX ADMIN — CARBIDOPA AND LEVODOPA SCH EACH: 25; 250 TABLET ORAL at 06:30

## 2022-04-08 RX ADMIN — MULTIVITAMIN TABLET SCH TAB: TABLET at 09:32

## 2022-04-08 RX ADMIN — INSULIN ASPART SCH UNITS: 100 INJECTION, SOLUTION INTRAVENOUS; SUBCUTANEOUS at 22:24

## 2022-04-08 RX ADMIN — POTASSIUM CHLORIDE SCH MLS/HR: 149 INJECTION, SOLUTION, CONCENTRATE INTRAVENOUS at 06:32

## 2022-04-08 RX ADMIN — ALLOPURINOL SCH MG: 300 TABLET ORAL at 09:33

## 2022-04-08 RX ADMIN — INSULIN ASPART SCH: 100 INJECTION, SOLUTION INTRAVENOUS; SUBCUTANEOUS at 06:32

## 2022-04-08 RX ADMIN — IPRATROPIUM BROMIDE AND ALBUTEROL SULFATE SCH AMP: .5; 3 SOLUTION RESPIRATORY (INHALATION) at 20:44

## 2022-04-08 RX ADMIN — ESCITALOPRAM OXALATE SCH MG: 10 TABLET, FILM COATED ORAL at 09:34

## 2022-04-08 RX ADMIN — FERROUS SULFATE TAB EC 324 MG (65 MG FE EQUIVALENT) SCH MG: 324 (65 FE) TABLET DELAYED RESPONSE at 22:24

## 2022-04-08 RX ADMIN — IPRATROPIUM BROMIDE AND ALBUTEROL SULFATE SCH AMP: .5; 3 SOLUTION RESPIRATORY (INHALATION) at 12:29

## 2022-04-08 RX ADMIN — INSULIN ASPART SCH UNITS: 100 INJECTION, SOLUTION INTRAVENOUS; SUBCUTANEOUS at 19:00

## 2022-04-08 RX ADMIN — CARBIDOPA AND LEVODOPA SCH EACH: 25; 250 TABLET ORAL at 09:34

## 2022-04-08 RX ADMIN — CARBIDOPA AND LEVODOPA SCH EACH: 25; 250 TABLET ORAL at 14:00

## 2022-04-08 RX ADMIN — MIDODRINE HYDROCHLORIDE SCH MG: 5 TABLET ORAL at 09:32

## 2022-04-08 RX ADMIN — ATORVASTATIN CALCIUM SCH MG: 20 TABLET, FILM COATED ORAL at 22:22

## 2022-04-08 RX ADMIN — IPRATROPIUM BROMIDE AND ALBUTEROL SULFATE SCH AMP: .5; 3 SOLUTION RESPIRATORY (INHALATION) at 08:30

## 2022-04-09 LAB
ALBUMIN SERPL-MCNC: 2.3 G/DL (ref 3.4–5)
ALP SERPL-CCNC: 105 U/L (ref 45–117)
ALT SERPL-CCNC: 12 U/L (ref 13–61)
ANION GAP SERPL CALC-SCNC: 5 MMOL/L (ref 8–16)
AST SERPL-CCNC: 17 U/L (ref 15–37)
BILIRUB SERPL-MCNC: 0.6 MG/DL (ref 0.2–1)
BUN SERPL-MCNC: 69.6 MG/DL (ref 7–18)
CALCIUM SERPL-MCNC: 8.5 MG/DL (ref 8.5–10.1)
CHLORIDE SERPL-SCNC: 108 MMOL/L (ref 98–107)
CO2 SERPL-SCNC: 31 MMOL/L (ref 21–32)
CREAT SERPL-MCNC: 2.3 MG/DL (ref 0.55–1.3)
GLUCOSE SERPL-MCNC: 134 MG/DL (ref 74–106)
IRON SERPL-MCNC: 39 UG/DL (ref 50–175)
PROT SERPL-MCNC: 5.5 G/DL (ref 6.4–8.2)
SODIUM SERPL-SCNC: 144 MMOL/L (ref 136–145)
TIBC SERPL-MCNC: 148 UG/DL (ref 250–450)

## 2022-04-09 RX ADMIN — INSULIN ASPART SCH: 100 INJECTION, SOLUTION INTRAVENOUS; SUBCUTANEOUS at 21:46

## 2022-04-09 RX ADMIN — FERROUS SULFATE TAB EC 324 MG (65 MG FE EQUIVALENT) SCH MG: 324 (65 FE) TABLET DELAYED RESPONSE at 21:47

## 2022-04-09 RX ADMIN — CARBIDOPA AND LEVODOPA SCH EACH: 25; 250 TABLET ORAL at 18:32

## 2022-04-09 RX ADMIN — IPRATROPIUM BROMIDE AND ALBUTEROL SULFATE SCH AMP: .5; 3 SOLUTION RESPIRATORY (INHALATION) at 12:56

## 2022-04-09 RX ADMIN — Medication SCH ML: at 18:32

## 2022-04-09 RX ADMIN — ALLOPURINOL SCH MG: 300 TABLET ORAL at 09:53

## 2022-04-09 RX ADMIN — IPRATROPIUM BROMIDE AND ALBUTEROL SULFATE SCH AMP: .5; 3 SOLUTION RESPIRATORY (INHALATION) at 16:22

## 2022-04-09 RX ADMIN — OXYCODONE HYDROCHLORIDE AND ACETAMINOPHEN SCH MG: 500 TABLET ORAL at 09:53

## 2022-04-09 RX ADMIN — INSULIN ASPART SCH: 100 INJECTION, SOLUTION INTRAVENOUS; SUBCUTANEOUS at 06:24

## 2022-04-09 RX ADMIN — MIDODRINE HYDROCHLORIDE SCH MG: 5 TABLET ORAL at 09:53

## 2022-04-09 RX ADMIN — ESCITALOPRAM OXALATE SCH MG: 10 TABLET, FILM COATED ORAL at 09:53

## 2022-04-09 RX ADMIN — RIVAROXABAN SCH MG: 10 TABLET, FILM COATED ORAL at 18:32

## 2022-04-09 RX ADMIN — MIDODRINE HYDROCHLORIDE SCH MG: 5 TABLET ORAL at 21:47

## 2022-04-09 RX ADMIN — CARBIDOPA AND LEVODOPA SCH EACH: 25; 250 TABLET ORAL at 09:53

## 2022-04-09 RX ADMIN — CARBIDOPA AND LEVODOPA SCH EACH: 25; 250 TABLET ORAL at 13:58

## 2022-04-09 RX ADMIN — Medication SCH ML: at 09:53

## 2022-04-09 RX ADMIN — IPRATROPIUM BROMIDE AND ALBUTEROL SULFATE SCH AMP: .5; 3 SOLUTION RESPIRATORY (INHALATION) at 21:00

## 2022-04-09 RX ADMIN — FERROUS SULFATE TAB EC 324 MG (65 MG FE EQUIVALENT) SCH MG: 324 (65 FE) TABLET DELAYED RESPONSE at 09:53

## 2022-04-09 RX ADMIN — IPRATROPIUM BROMIDE AND ALBUTEROL SULFATE SCH AMP: .5; 3 SOLUTION RESPIRATORY (INHALATION) at 07:38

## 2022-04-09 RX ADMIN — INSULIN ASPART SCH: 100 INJECTION, SOLUTION INTRAVENOUS; SUBCUTANEOUS at 12:22

## 2022-04-09 RX ADMIN — MULTIVITAMIN TABLET SCH TAB: TABLET at 09:53

## 2022-04-09 RX ADMIN — CARBIDOPA AND LEVODOPA SCH EACH: 25; 250 TABLET ORAL at 06:25

## 2022-04-09 RX ADMIN — ATORVASTATIN CALCIUM SCH MG: 20 TABLET, FILM COATED ORAL at 21:47

## 2022-04-09 RX ADMIN — INSULIN ASPART SCH UNITS: 100 INJECTION, SOLUTION INTRAVENOUS; SUBCUTANEOUS at 18:32

## 2022-04-10 LAB
ANISOCYTOSIS BLD QL: (no result)
BASOPHILS # BLD: 0.8 % (ref 0–2)
DEPRECATED RDW RBC AUTO: 22.7 % (ref 11.9–15.9)
EOSINOPHIL # BLD: 3.4 % (ref 0–4.5)
HCT VFR BLD CALC: 27.1 % (ref 35.4–49)
HGB BLD-MCNC: 8.3 GM/DL (ref 11.7–16.9)
LYMPHOCYTES # BLD: 17 % (ref 8–40)
MACROCYTES BLD QL: (no result)
MCH RBC QN AUTO: 25.9 PG (ref 25.7–33.7)
MCHC RBC AUTO-ENTMCNC: 30.7 G/DL (ref 32–35.9)
MCV RBC: 84.4 FL (ref 80–96)
MONOCYTES # BLD AUTO: 5.8 % (ref 3.8–10.2)
NEUTROPHILS # BLD: 73 % (ref 42.8–82.8)
PLATELET # BLD AUTO: 157 10^3/UL (ref 134–434)
PLATELET BLD QL SMEAR: ADEQUATE
PMV BLD: 11.9 FL (ref 7.5–11.1)
RBC # BLD AUTO: 3.22 M/MM3 (ref 4–5.6)
WBC # BLD AUTO: 8.1 K/MM3 (ref 4–10)

## 2022-04-10 RX ADMIN — MULTIVITAMIN TABLET SCH TAB: TABLET at 09:41

## 2022-04-10 RX ADMIN — CARBIDOPA AND LEVODOPA SCH EACH: 25; 250 TABLET ORAL at 14:12

## 2022-04-10 RX ADMIN — Medication SCH ML: at 08:29

## 2022-04-10 RX ADMIN — IPRATROPIUM BROMIDE AND ALBUTEROL SULFATE SCH AMP: .5; 3 SOLUTION RESPIRATORY (INHALATION) at 10:30

## 2022-04-10 RX ADMIN — Medication SCH ML: at 18:12

## 2022-04-10 RX ADMIN — INSULIN ASPART SCH UNITS: 100 INJECTION, SOLUTION INTRAVENOUS; SUBCUTANEOUS at 22:42

## 2022-04-10 RX ADMIN — INSULIN ASPART SCH UNITS: 100 INJECTION, SOLUTION INTRAVENOUS; SUBCUTANEOUS at 16:53

## 2022-04-10 RX ADMIN — ALLOPURINOL SCH MG: 300 TABLET ORAL at 09:41

## 2022-04-10 RX ADMIN — RIVAROXABAN SCH MG: 10 TABLET, FILM COATED ORAL at 18:12

## 2022-04-10 RX ADMIN — CARBIDOPA AND LEVODOPA SCH EACH: 25; 250 TABLET ORAL at 09:41

## 2022-04-10 RX ADMIN — MIDODRINE HYDROCHLORIDE SCH MG: 5 TABLET ORAL at 09:41

## 2022-04-10 RX ADMIN — CARBIDOPA AND LEVODOPA SCH EACH: 25; 250 TABLET ORAL at 06:23

## 2022-04-10 RX ADMIN — MIDODRINE HYDROCHLORIDE SCH MG: 5 TABLET ORAL at 22:42

## 2022-04-10 RX ADMIN — FERROUS SULFATE TAB EC 324 MG (65 MG FE EQUIVALENT) SCH MG: 324 (65 FE) TABLET DELAYED RESPONSE at 09:40

## 2022-04-10 RX ADMIN — FERROUS SULFATE TAB EC 324 MG (65 MG FE EQUIVALENT) SCH MG: 324 (65 FE) TABLET DELAYED RESPONSE at 22:42

## 2022-04-10 RX ADMIN — INSULIN ASPART SCH: 100 INJECTION, SOLUTION INTRAVENOUS; SUBCUTANEOUS at 11:11

## 2022-04-10 RX ADMIN — CARBIDOPA AND LEVODOPA SCH EACH: 25; 250 TABLET ORAL at 18:12

## 2022-04-10 RX ADMIN — INSULIN ASPART SCH: 100 INJECTION, SOLUTION INTRAVENOUS; SUBCUTANEOUS at 06:23

## 2022-04-10 RX ADMIN — OXYCODONE HYDROCHLORIDE AND ACETAMINOPHEN SCH MG: 500 TABLET ORAL at 09:41

## 2022-04-10 RX ADMIN — ATORVASTATIN CALCIUM SCH MG: 20 TABLET, FILM COATED ORAL at 22:42

## 2022-04-10 RX ADMIN — ESCITALOPRAM OXALATE SCH MG: 10 TABLET, FILM COATED ORAL at 09:40

## 2022-04-11 VITALS — TEMPERATURE: 98.1 F | SYSTOLIC BLOOD PRESSURE: 110 MMHG | HEART RATE: 70 BPM | DIASTOLIC BLOOD PRESSURE: 58 MMHG

## 2022-04-11 RX ADMIN — INSULIN ASPART SCH UNITS: 100 INJECTION, SOLUTION INTRAVENOUS; SUBCUTANEOUS at 16:57

## 2022-04-11 RX ADMIN — ESCITALOPRAM OXALATE SCH MG: 10 TABLET, FILM COATED ORAL at 09:10

## 2022-04-11 RX ADMIN — MIDODRINE HYDROCHLORIDE SCH MG: 5 TABLET ORAL at 09:11

## 2022-04-11 RX ADMIN — Medication SCH ML: at 08:58

## 2022-04-11 RX ADMIN — RIVAROXABAN SCH MG: 10 TABLET, FILM COATED ORAL at 18:18

## 2022-04-11 RX ADMIN — Medication SCH ML: at 16:57

## 2022-04-11 RX ADMIN — CARBIDOPA AND LEVODOPA SCH EACH: 25; 250 TABLET ORAL at 13:56

## 2022-04-11 RX ADMIN — ALLOPURINOL SCH MG: 300 TABLET ORAL at 09:11

## 2022-04-11 RX ADMIN — MULTIVITAMIN TABLET SCH TAB: TABLET at 09:11

## 2022-04-11 RX ADMIN — INSULIN ASPART SCH UNITS: 100 INJECTION, SOLUTION INTRAVENOUS; SUBCUTANEOUS at 12:11

## 2022-04-11 RX ADMIN — CARBIDOPA AND LEVODOPA SCH EACH: 25; 250 TABLET ORAL at 18:18

## 2022-04-11 RX ADMIN — FERROUS SULFATE TAB EC 324 MG (65 MG FE EQUIVALENT) SCH MG: 324 (65 FE) TABLET DELAYED RESPONSE at 09:11

## 2022-04-11 RX ADMIN — CARBIDOPA AND LEVODOPA SCH EACH: 25; 250 TABLET ORAL at 09:11

## 2022-04-11 RX ADMIN — INSULIN ASPART SCH: 100 INJECTION, SOLUTION INTRAVENOUS; SUBCUTANEOUS at 06:06

## 2022-04-11 RX ADMIN — OXYCODONE HYDROCHLORIDE AND ACETAMINOPHEN SCH MG: 500 TABLET ORAL at 09:11

## 2022-04-11 RX ADMIN — CARBIDOPA AND LEVODOPA SCH EACH: 25; 250 TABLET ORAL at 05:59

## 2022-07-08 ENCOUNTER — HOSPITAL ENCOUNTER (INPATIENT)
Dept: HOSPITAL 74 - JER | Age: 77
LOS: 7 days | DRG: 870 | End: 2022-07-15
Attending: INTERNAL MEDICINE | Admitting: INTERNAL MEDICINE
Payer: COMMERCIAL

## 2022-07-08 VITALS — BODY MASS INDEX: 29.9 KG/M2

## 2022-07-08 DIAGNOSIS — J96.01: ICD-10-CM

## 2022-07-08 DIAGNOSIS — E11.9: ICD-10-CM

## 2022-07-08 DIAGNOSIS — R33.9: ICD-10-CM

## 2022-07-08 DIAGNOSIS — E78.5: ICD-10-CM

## 2022-07-08 DIAGNOSIS — N17.9: ICD-10-CM

## 2022-07-08 DIAGNOSIS — Z95.0: ICD-10-CM

## 2022-07-08 DIAGNOSIS — I11.0: ICD-10-CM

## 2022-07-08 DIAGNOSIS — I25.10: ICD-10-CM

## 2022-07-08 DIAGNOSIS — E87.2: ICD-10-CM

## 2022-07-08 DIAGNOSIS — I50.9: ICD-10-CM

## 2022-07-08 DIAGNOSIS — R65.21: ICD-10-CM

## 2022-07-08 DIAGNOSIS — I46.9: ICD-10-CM

## 2022-07-08 DIAGNOSIS — J69.0: ICD-10-CM

## 2022-07-08 DIAGNOSIS — F02.80: ICD-10-CM

## 2022-07-08 DIAGNOSIS — I95.9: ICD-10-CM

## 2022-07-08 DIAGNOSIS — G20: ICD-10-CM

## 2022-07-08 DIAGNOSIS — D72.829: ICD-10-CM

## 2022-07-08 DIAGNOSIS — N47.1: ICD-10-CM

## 2022-07-08 DIAGNOSIS — A41.89: Primary | ICD-10-CM

## 2022-07-08 LAB
ALBUMIN SERPL-MCNC: 2.9 G/DL (ref 3.4–5)
ALP SERPL-CCNC: 155 U/L (ref 45–117)
ALT SERPL-CCNC: 7 U/L (ref 13–61)
ANION GAP SERPL CALC-SCNC: 6 MMOL/L (ref 8–16)
AST SERPL-CCNC: 20 U/L (ref 15–37)
BILIRUB SERPL-MCNC: 0.6 MG/DL (ref 0.2–1)
BNP SERPL-MCNC: (no result) PG/ML (ref 5–450)
BUN SERPL-MCNC: 66 MG/DL (ref 7–18)
CALCIUM SERPL-MCNC: 8.5 MG/DL (ref 8.5–10.1)
CHLORIDE SERPL-SCNC: 104 MMOL/L (ref 98–107)
CO2 SERPL-SCNC: 30 MMOL/L (ref 21–32)
CREAT SERPL-MCNC: 3.3 MG/DL (ref 0.55–1.3)
GLUCOSE SERPL-MCNC: 93 MG/DL (ref 74–106)
LACTATE SERPL-MCNC: 2.9 MMOL/L (ref 0.4–2)
PROT SERPL-MCNC: 5.8 G/DL (ref 6.4–8.2)
SODIUM SERPL-SCNC: 139 MMOL/L (ref 136–145)

## 2022-07-08 PROCEDURE — 5A1955Z RESPIRATORY VENTILATION, GREATER THAN 96 CONSECUTIVE HOURS: ICD-10-PCS

## 2022-07-08 PROCEDURE — 0BH17EZ INSERTION OF ENDOTRACHEAL AIRWAY INTO TRACHEA, VIA NATURAL OR ARTIFICIAL OPENING: ICD-10-PCS

## 2022-07-08 PROCEDURE — U0005 INFEC AGEN DETEC AMPLI PROBE: HCPCS

## 2022-07-08 PROCEDURE — U0003 INFECTIOUS AGENT DETECTION BY NUCLEIC ACID (DNA OR RNA); SEVERE ACUTE RESPIRATORY SYNDROME CORONAVIRUS 2 (SARS-COV-2) (CORONAVIRUS DISEASE [COVID-19]), AMPLIFIED PROBE TECHNIQUE, MAKING USE OF HIGH THROUGHPUT TECHNOLOGIES AS DESCRIBED BY CMS-2020-01-R: HCPCS

## 2022-07-08 RX ADMIN — SODIUM CHLORIDE SCH MLS/HR: 9 INJECTION, SOLUTION INTRAVENOUS at 23:54

## 2022-07-08 RX ADMIN — SODIUM CHLORIDE SCH MLS/HR: 9 INJECTION, SOLUTION INTRAVENOUS at 18:33

## 2022-07-08 RX ADMIN — SODIUM CHLORIDE SCH: 9 INJECTION, SOLUTION INTRAVENOUS at 18:00

## 2022-07-09 LAB
ALBUMIN SERPL-MCNC: 2.1 G/DL (ref 3.4–5)
ALP SERPL-CCNC: 107 U/L (ref 45–117)
ALT SERPL-CCNC: < 6 U/L (ref 13–61)
ANION GAP SERPL CALC-SCNC: 7 MMOL/L (ref 8–16)
ANISOCYTOSIS BLD QL: (no result)
AST SERPL-CCNC: 17 U/L (ref 15–37)
BASOPHILS # BLD: 0.2 % (ref 0–2)
BILIRUB SERPL-MCNC: 0.8 MG/DL (ref 0.2–1)
BUN SERPL-MCNC: 70.4 MG/DL (ref 7–18)
CALCIUM SERPL-MCNC: 8.1 MG/DL (ref 8.5–10.1)
CHLORIDE SERPL-SCNC: 106 MMOL/L (ref 98–107)
CO2 SERPL-SCNC: 28 MMOL/L (ref 21–32)
CREAT SERPL-MCNC: 3.4 MG/DL (ref 0.55–1.3)
DEPRECATED RDW RBC AUTO: 22.6 % (ref 11.9–15.9)
DOHLE BOD BLD QL SMEAR: (no result)
EOSINOPHIL # BLD: 0.1 % (ref 0–4.5)
GLUCOSE SERPL-MCNC: 66 MG/DL (ref 74–106)
HCT VFR BLD CALC: 29.9 % (ref 35.4–49)
HGB BLD-MCNC: 9.3 GM/DL (ref 11.7–16.9)
LACTATE SERPL-MCNC: 2.3 MMOL/L (ref 0.4–2)
LYMPHOCYTES # BLD: 8.6 % (ref 8–40)
MACROCYTES BLD QL: (no result)
MAGNESIUM SERPL-MCNC: 2.4 MG/DL (ref 1.8–2.4)
MCH RBC QN AUTO: 26.7 PG (ref 25.7–33.7)
MCHC RBC AUTO-ENTMCNC: 31.1 G/DL (ref 32–35.9)
MCV RBC: 85.9 FL (ref 80–96)
MONOCYTES # BLD AUTO: 7.4 % (ref 3.8–10.2)
NEUTROPHILS # BLD: 83.7 % (ref 42.8–82.8)
PHOSPHATE SERPL-MCNC: 4.9 MG/DL (ref 2.5–4.9)
PLATELET # BLD AUTO: 198 10^3/UL (ref 134–434)
PLATELET BLD QL SMEAR: (no result)
PMV BLD: 18 FL (ref 7.5–11.1)
PROT SERPL-MCNC: 4.6 G/DL (ref 6.4–8.2)
RBC # BLD AUTO: 3.48 M/MM3 (ref 4–5.6)
ROULEAUX BLD QL SMEAR: (no result)
SODIUM SERPL-SCNC: 141 MMOL/L (ref 136–145)
WBC # BLD AUTO: 8.9 K/MM3 (ref 4–10)

## 2022-07-09 PROCEDURE — 05HM33Z INSERTION OF INFUSION DEVICE INTO RIGHT INTERNAL JUGULAR VEIN, PERCUTANEOUS APPROACH: ICD-10-PCS | Performed by: INTERNAL MEDICINE

## 2022-07-09 PROCEDURE — B543ZZA ULTRASONOGRAPHY OF RIGHT JUGULAR VEINS, GUIDANCE: ICD-10-PCS | Performed by: INTERNAL MEDICINE

## 2022-07-09 RX ADMIN — VASOPRESSIN SCH MLS/HR: 20 INJECTION INTRAVENOUS at 01:02

## 2022-07-09 RX ADMIN — SODIUM CHLORIDE SCH MLS/HR: 9 INJECTION, SOLUTION INTRAVENOUS at 16:39

## 2022-07-09 RX ADMIN — SODIUM ZIRCONIUM CYCLOSILICATE SCH GM: 5 POWDER, FOR SUSPENSION ORAL at 09:36

## 2022-07-09 RX ADMIN — SODIUM ZIRCONIUM CYCLOSILICATE SCH GM: 5 POWDER, FOR SUSPENSION ORAL at 08:09

## 2022-07-09 RX ADMIN — SCOPALAMINE SCH PATCH: 1 PATCH, EXTENDED RELEASE TRANSDERMAL at 09:36

## 2022-07-09 RX ADMIN — SODIUM CHLORIDE SCH MLS/HR: 9 INJECTION, SOLUTION INTRAVENOUS at 02:59

## 2022-07-10 RX ADMIN — SODIUM CHLORIDE SCH MLS/HR: 9 INJECTION, SOLUTION INTRAVENOUS at 01:29

## 2022-07-10 RX ADMIN — PIPERACILLIN SODIUM AND TAZOBACTAM SODIUM SCH MLS/HR: .25; 2 INJECTION, POWDER, LYOPHILIZED, FOR SOLUTION INTRAVENOUS at 01:30

## 2022-07-10 RX ADMIN — SODIUM CHLORIDE SCH MLS/HR: 9 INJECTION, SOLUTION INTRAVENOUS at 09:37

## 2022-07-10 RX ADMIN — VASOPRESSIN SCH MLS/HR: 20 INJECTION INTRAVENOUS at 20:58

## 2022-07-10 RX ADMIN — SODIUM ZIRCONIUM CYCLOSILICATE SCH GM: 5 POWDER, FOR SUSPENSION ORAL at 09:30

## 2022-07-10 RX ADMIN — PIPERACILLIN SODIUM AND TAZOBACTAM SODIUM SCH MLS/HR: .25; 2 INJECTION, POWDER, LYOPHILIZED, FOR SOLUTION INTRAVENOUS at 09:30

## 2022-07-10 RX ADMIN — VASOPRESSIN SCH: 20 INJECTION INTRAVENOUS at 09:29

## 2022-07-11 LAB
ALBUMIN SERPL-MCNC: 2.2 G/DL (ref 3.4–5)
ALP SERPL-CCNC: 140 U/L (ref 45–117)
ALT SERPL-CCNC: 11 U/L (ref 13–61)
ANION GAP SERPL CALC-SCNC: 14 MMOL/L (ref 8–16)
ANION GAP SERPL CALC-SCNC: 15 MMOL/L (ref 8–16)
AST SERPL-CCNC: 83 U/L (ref 15–37)
BILIRUB SERPL-MCNC: 2.3 MG/DL (ref 0.2–1)
BUN SERPL-MCNC: 87.9 MG/DL (ref 7–18)
BUN SERPL-MCNC: 92.7 MG/DL (ref 7–18)
CALCIUM SERPL-MCNC: 8.2 MG/DL (ref 8.5–10.1)
CALCIUM SERPL-MCNC: 8.4 MG/DL (ref 8.5–10.1)
CHLORIDE SERPL-SCNC: 105 MMOL/L (ref 98–107)
CHLORIDE SERPL-SCNC: 107 MMOL/L (ref 98–107)
CO2 SERPL-SCNC: 21 MMOL/L (ref 21–32)
CO2 SERPL-SCNC: 22 MMOL/L (ref 21–32)
CREAT SERPL-MCNC: 4 MG/DL (ref 0.55–1.3)
CREAT SERPL-MCNC: 4 MG/DL (ref 0.55–1.3)
DEPRECATED RDW RBC AUTO: 22.9 % (ref 11.9–15.9)
GLUCOSE SERPL-MCNC: 128 MG/DL (ref 74–106)
GLUCOSE SERPL-MCNC: 92 MG/DL (ref 74–106)
HCT VFR BLD CALC: 30.8 % (ref 35.4–49)
HGB BLD-MCNC: 9.6 GM/DL (ref 11.7–16.9)
LACTATE SERPL-MCNC: 4.8 MMOL/L (ref 0.4–2)
MAGNESIUM SERPL-MCNC: 2.6 MG/DL (ref 1.8–2.4)
MCH RBC QN AUTO: 26 PG (ref 25.7–33.7)
MCHC RBC AUTO-ENTMCNC: 31.1 G/DL (ref 32–35.9)
MCV RBC: 83.8 FL (ref 80–96)
PHOSPHATE SERPL-MCNC: 6.4 MG/DL (ref 2.5–4.9)
PLATELET # BLD AUTO: 180 10^3/UL (ref 134–434)
PMV BLD: 10.9 FL (ref 7.5–11.1)
PROT SERPL-MCNC: 4.8 G/DL (ref 6.4–8.2)
RBC # BLD AUTO: 3.68 M/MM3 (ref 4–5.6)
SODIUM SERPL-SCNC: 141 MMOL/L (ref 136–145)
SODIUM SERPL-SCNC: 142 MMOL/L (ref 136–145)
WBC # BLD AUTO: 29.1 K/MM3 (ref 4–10)

## 2022-07-11 RX ADMIN — SODIUM CHLORIDE SCH MLS/HR: 9 INJECTION, SOLUTION INTRAVENOUS at 23:05

## 2022-07-11 RX ADMIN — VASOPRESSIN SCH: 20 INJECTION INTRAVENOUS at 01:10

## 2022-07-11 RX ADMIN — MUPIROCIN SCH APPLIC: 20 OINTMENT TOPICAL at 22:55

## 2022-07-11 RX ADMIN — SODIUM CHLORIDE SCH MLS/HR: 9 INJECTION, SOLUTION INTRAVENOUS at 21:23

## 2022-07-11 RX ADMIN — PIPERACILLIN SODIUM AND TAZOBACTAM SODIUM SCH: .25; 2 INJECTION, POWDER, LYOPHILIZED, FOR SOLUTION INTRAVENOUS at 18:12

## 2022-07-11 RX ADMIN — SODIUM CHLORIDE SCH MLS/HR: 9 INJECTION, SOLUTION INTRAVENOUS at 04:50

## 2022-07-11 RX ADMIN — SODIUM CHLORIDE SCH MLS/HR: 9 INJECTION, SOLUTION INTRAVENOUS at 09:49

## 2022-07-11 RX ADMIN — SODIUM CHLORIDE SCH: 9 INJECTION, SOLUTION INTRAVENOUS at 01:10

## 2022-07-11 RX ADMIN — SODIUM ZIRCONIUM CYCLOSILICATE SCH GM: 5 POWDER, FOR SUSPENSION ORAL at 09:44

## 2022-07-11 RX ADMIN — SODIUM CHLORIDE SCH: 9 INJECTION, SOLUTION INTRAVENOUS at 00:16

## 2022-07-11 RX ADMIN — PIPERACILLIN SODIUM AND TAZOBACTAM SODIUM SCH MLS/HR: .25; 2 INJECTION, POWDER, LYOPHILIZED, FOR SOLUTION INTRAVENOUS at 17:59

## 2022-07-12 LAB
ALBUMIN SERPL-MCNC: 2.1 G/DL (ref 3.4–5)
ALP SERPL-CCNC: 129 U/L (ref 45–117)
ALT SERPL-CCNC: 18 U/L (ref 13–61)
ANION GAP SERPL CALC-SCNC: 15 MMOL/L (ref 8–16)
ANISOCYTOSIS BLD QL: (no result)
AST SERPL-CCNC: 144 U/L (ref 15–37)
BILIRUB SERPL-MCNC: 2.8 MG/DL (ref 0.2–1)
BUN SERPL-MCNC: 95.5 MG/DL (ref 7–18)
CALCIUM SERPL-MCNC: 8.3 MG/DL (ref 8.5–10.1)
CHLORIDE SERPL-SCNC: 106 MMOL/L (ref 98–107)
CO2 SERPL-SCNC: 20 MMOL/L (ref 21–32)
CREAT SERPL-MCNC: 4.1 MG/DL (ref 0.55–1.3)
DEPRECATED RDW RBC AUTO: 22.1 % (ref 11.9–15.9)
GLUCOSE SERPL-MCNC: 140 MG/DL (ref 74–106)
HCT VFR BLD CALC: 30 % (ref 35.4–49)
HGB BLD-MCNC: 9.2 GM/DL (ref 11.7–16.9)
MACROCYTES BLD QL: (no result)
MAGNESIUM SERPL-MCNC: 2.5 MG/DL (ref 1.8–2.4)
MCH RBC QN AUTO: 25.7 PG (ref 25.7–33.7)
MCHC RBC AUTO-ENTMCNC: 30.8 G/DL (ref 32–35.9)
MCV RBC: 83.5 FL (ref 80–96)
PHOSPHATE SERPL-MCNC: 6.9 MG/DL (ref 2.5–4.9)
PLATELET # BLD AUTO: 148 10^3/UL (ref 134–434)
PLATELET BLD QL SMEAR: (no result)
PMV BLD: 11.3 FL (ref 7.5–11.1)
PROT SERPL-MCNC: 4.7 G/DL (ref 6.4–8.2)
RBC # BLD AUTO: 3.59 M/MM3 (ref 4–5.6)
SODIUM SERPL-SCNC: 140 MMOL/L (ref 136–145)
WBC # BLD AUTO: 33.4 K/MM3 (ref 4–10)

## 2022-07-12 RX ADMIN — CHLORHEXIDINE GLUCONATE SCH APPLIC: 213 SOLUTION TOPICAL at 21:38

## 2022-07-12 RX ADMIN — MUPIROCIN SCH APPLIC: 20 OINTMENT TOPICAL at 09:58

## 2022-07-12 RX ADMIN — SCOPALAMINE SCH PATCH: 1 PATCH, EXTENDED RELEASE TRANSDERMAL at 09:46

## 2022-07-12 RX ADMIN — SODIUM BICARBONATE SCH MEQ: 84 INJECTION, SOLUTION INTRAVENOUS at 16:50

## 2022-07-12 RX ADMIN — PIPERACILLIN SODIUM AND TAZOBACTAM SODIUM SCH MLS/HR: .25; 2 INJECTION, POWDER, LYOPHILIZED, FOR SOLUTION INTRAVENOUS at 09:58

## 2022-07-12 RX ADMIN — SODIUM ZIRCONIUM CYCLOSILICATE SCH: 5 POWDER, FOR SUSPENSION ORAL at 21:38

## 2022-07-12 RX ADMIN — PIPERACILLIN SODIUM AND TAZOBACTAM SODIUM SCH MLS/HR: .25; 2 INJECTION, POWDER, LYOPHILIZED, FOR SOLUTION INTRAVENOUS at 17:48

## 2022-07-12 RX ADMIN — PIPERACILLIN SODIUM AND TAZOBACTAM SODIUM SCH MLS/HR: .25; 2 INJECTION, POWDER, LYOPHILIZED, FOR SOLUTION INTRAVENOUS at 02:55

## 2022-07-12 RX ADMIN — VASOPRESSIN SCH MLS/HR: 20 INJECTION INTRAVENOUS at 01:55

## 2022-07-12 RX ADMIN — SODIUM ZIRCONIUM CYCLOSILICATE SCH GM: 5 POWDER, FOR SUSPENSION ORAL at 09:46

## 2022-07-12 RX ADMIN — SODIUM CHLORIDE SCH MLS/HR: 9 INJECTION, SOLUTION INTRAVENOUS at 22:57

## 2022-07-12 RX ADMIN — SODIUM CHLORIDE SCH MLS/HR: 9 INJECTION, SOLUTION INTRAVENOUS at 02:30

## 2022-07-12 RX ADMIN — SODIUM BICARBONATE SCH MEQ: 84 INJECTION, SOLUTION INTRAVENOUS at 15:10

## 2022-07-12 RX ADMIN — SODIUM ZIRCONIUM CYCLOSILICATE SCH GM: 5 POWDER, FOR SUSPENSION ORAL at 14:03

## 2022-07-12 RX ADMIN — Medication SCH MLS/HR: at 14:11

## 2022-07-12 RX ADMIN — MUPIROCIN SCH APPLIC: 20 OINTMENT TOPICAL at 21:38

## 2022-07-13 RX ADMIN — CHLORHEXIDINE GLUCONATE SCH APPLIC: 213 SOLUTION TOPICAL at 21:42

## 2022-07-13 RX ADMIN — SODIUM ZIRCONIUM CYCLOSILICATE SCH GM: 5 POWDER, FOR SUSPENSION ORAL at 14:14

## 2022-07-13 RX ADMIN — PIPERACILLIN SODIUM AND TAZOBACTAM SODIUM SCH MLS/HR: .25; 2 INJECTION, POWDER, LYOPHILIZED, FOR SOLUTION INTRAVENOUS at 02:40

## 2022-07-13 RX ADMIN — MUPIROCIN SCH APPLIC: 20 OINTMENT TOPICAL at 09:35

## 2022-07-13 RX ADMIN — SODIUM CHLORIDE SCH MLS/HR: 9 INJECTION, SOLUTION INTRAVENOUS at 09:00

## 2022-07-13 RX ADMIN — Medication SCH: at 18:14

## 2022-07-13 RX ADMIN — PIPERACILLIN SODIUM AND TAZOBACTAM SODIUM SCH MLS/HR: .25; 2 INJECTION, POWDER, LYOPHILIZED, FOR SOLUTION INTRAVENOUS at 09:35

## 2022-07-13 RX ADMIN — VASOPRESSIN SCH MLS/HR: 20 INJECTION INTRAVENOUS at 11:15

## 2022-07-13 RX ADMIN — MUPIROCIN SCH APPLIC: 20 OINTMENT TOPICAL at 21:42

## 2022-07-13 RX ADMIN — SODIUM CHLORIDE SCH: 9 INJECTION, SOLUTION INTRAVENOUS at 10:49

## 2022-07-13 RX ADMIN — VASOPRESSIN SCH: 20 INJECTION INTRAVENOUS at 09:36

## 2022-07-13 RX ADMIN — PIPERACILLIN SODIUM AND TAZOBACTAM SODIUM SCH MLS/HR: .25; 2 INJECTION, POWDER, LYOPHILIZED, FOR SOLUTION INTRAVENOUS at 18:15

## 2022-07-13 RX ADMIN — SODIUM CHLORIDE SCH MLS/HR: 9 INJECTION, SOLUTION INTRAVENOUS at 18:15

## 2022-07-14 VITALS — HEART RATE: 50 BPM

## 2022-07-14 RX ADMIN — VASOPRESSIN SCH MLS/HR: 20 INJECTION INTRAVENOUS at 02:30

## 2022-07-14 RX ADMIN — PIPERACILLIN SODIUM AND TAZOBACTAM SODIUM SCH MLS/HR: .25; 2 INJECTION, POWDER, LYOPHILIZED, FOR SOLUTION INTRAVENOUS at 02:56

## 2022-07-14 RX ADMIN — CHLORHEXIDINE GLUCONATE SCH APPLIC: 213 SOLUTION TOPICAL at 22:57

## 2022-07-14 RX ADMIN — PIPERACILLIN SODIUM AND TAZOBACTAM SODIUM SCH: .25; 2 INJECTION, POWDER, LYOPHILIZED, FOR SOLUTION INTRAVENOUS at 11:38

## 2022-07-14 RX ADMIN — PIPERACILLIN SODIUM AND TAZOBACTAM SODIUM SCH MLS/HR: .25; 2 INJECTION, POWDER, LYOPHILIZED, FOR SOLUTION INTRAVENOUS at 10:50

## 2022-07-14 RX ADMIN — Medication SCH: at 14:00

## 2022-07-14 RX ADMIN — PIPERACILLIN SODIUM AND TAZOBACTAM SODIUM SCH MLS/HR: .25; 2 INJECTION, POWDER, LYOPHILIZED, FOR SOLUTION INTRAVENOUS at 18:30

## 2022-07-14 RX ADMIN — SODIUM ZIRCONIUM CYCLOSILICATE SCH: 5 POWDER, FOR SUSPENSION ORAL at 11:30

## 2022-07-14 RX ADMIN — MUPIROCIN SCH APPLIC: 20 OINTMENT TOPICAL at 22:57

## 2022-07-14 RX ADMIN — SODIUM CHLORIDE SCH: 9 INJECTION, SOLUTION INTRAVENOUS at 03:09

## 2022-07-14 RX ADMIN — PIPERACILLIN SODIUM AND TAZOBACTAM SODIUM SCH: .25; 2 INJECTION, POWDER, LYOPHILIZED, FOR SOLUTION INTRAVENOUS at 11:39

## 2022-07-14 RX ADMIN — MUPIROCIN SCH APPLIC: 20 OINTMENT TOPICAL at 10:45

## 2022-07-15 VITALS — SYSTOLIC BLOOD PRESSURE: 89 MMHG | TEMPERATURE: 97.6 F | DIASTOLIC BLOOD PRESSURE: 44 MMHG

## 2022-07-15 RX ADMIN — SCOPALAMINE SCH PATCH: 1 PATCH, EXTENDED RELEASE TRANSDERMAL at 11:09

## 2022-07-15 RX ADMIN — PIPERACILLIN SODIUM AND TAZOBACTAM SODIUM SCH MLS/HR: .25; 2 INJECTION, POWDER, LYOPHILIZED, FOR SOLUTION INTRAVENOUS at 11:09

## 2022-07-15 RX ADMIN — PIPERACILLIN SODIUM AND TAZOBACTAM SODIUM SCH MLS/HR: .25; 2 INJECTION, POWDER, LYOPHILIZED, FOR SOLUTION INTRAVENOUS at 03:51

## 2022-07-15 RX ADMIN — MUPIROCIN SCH APPLIC: 20 OINTMENT TOPICAL at 11:09
